# Patient Record
Sex: MALE | Race: WHITE | NOT HISPANIC OR LATINO | Employment: FULL TIME | URBAN - METROPOLITAN AREA
[De-identification: names, ages, dates, MRNs, and addresses within clinical notes are randomized per-mention and may not be internally consistent; named-entity substitution may affect disease eponyms.]

---

## 2017-01-17 ENCOUNTER — HISTORICAL (OUTPATIENT)
Dept: RADIOLOGY | Facility: HOSPITAL | Age: 67
End: 2017-01-17

## 2017-06-15 ENCOUNTER — HISTORICAL (OUTPATIENT)
Dept: ADMINISTRATIVE | Facility: HOSPITAL | Age: 67
End: 2017-06-15

## 2017-06-15 LAB
ABS NEUT (OLG): 4.73 X10(3)/MCL (ref 2.1–9.2)
ALBUMIN SERPL-MCNC: 4.7 GM/DL (ref 3.4–5)
ALBUMIN/GLOB SERPL: 1 {RATIO}
ALP SERPL-CCNC: 67 UNIT/L (ref 20–120)
ALT SERPL-CCNC: 31 UNIT/L
AST SERPL-CCNC: 29 UNIT/L
BASOPHILS # BLD AUTO: 0.04 X10(3)/MCL
BASOPHILS NFR BLD AUTO: 1 % (ref 0–1)
BILIRUB SERPL-MCNC: 1.8 MG/DL
BILIRUBIN DIRECT+TOT PNL SERPL-MCNC: 0.2 MG/DL
BILIRUBIN DIRECT+TOT PNL SERPL-MCNC: 1.6 MG/DL
BUN SERPL-MCNC: 23 MG/DL (ref 7–25)
CALCIUM SERPL-MCNC: 9.6 MG/DL (ref 8.4–10.3)
CHLORIDE SERPL-SCNC: 100 MMOL/L (ref 96–110)
CHOLEST SERPL-MCNC: 105 MG/DL
CHOLEST/HDLC SERPL: 3.5 {RATIO} (ref 0–5)
CO2 SERPL-SCNC: 30 MMOL/L (ref 24–32)
CREAT SERPL-MCNC: 1.12 MG/DL (ref 0.7–1.4)
EOSINOPHIL # BLD AUTO: 0.11 X10(3)/MCL
EOSINOPHIL NFR BLD AUTO: 2 % (ref 0–5)
ERYTHROCYTE [DISTWIDTH] IN BLOOD BY AUTOMATED COUNT: 13.5 % (ref 11.5–14.5)
EST. AVERAGE GLUCOSE BLD GHB EST-MCNC: 105 MG/DL
GLOBULIN SER-MCNC: 3.3 GM/ML (ref 2.3–3.5)
GLUCOSE SERPL-MCNC: 98 MG/DL (ref 65–99)
HBA1C MFR BLD: 5.3 % (ref 4.7–5.6)
HCT VFR BLD AUTO: 54.8 % (ref 40–51)
HDLC SERPL-MCNC: 30 MG/DL
HGB BLD-MCNC: 18.1 GM/DL (ref 13.5–17.5)
IMM GRANULOCYTES # BLD AUTO: 0.03 10*3/UL
IMM GRANULOCYTES NFR BLD AUTO: 0 %
LDLC SERPL CALC-MCNC: 54 MG/DL (ref 0–130)
LYMPHOCYTES # BLD AUTO: 1.71 X10(3)/MCL
LYMPHOCYTES NFR BLD AUTO: 24 % (ref 15–40)
MCH RBC QN AUTO: 29.1 PG (ref 26–34)
MCHC RBC AUTO-ENTMCNC: 33 GM/DL (ref 31–37)
MCV RBC AUTO: 88.1 FL (ref 80–100)
MONOCYTES # BLD AUTO: 0.43 X10(3)/MCL
MONOCYTES NFR BLD AUTO: 6 % (ref 4–12)
NEUTROPHILS # BLD AUTO: 4.73 X10(3)/MCL
NEUTROPHILS NFR BLD AUTO: 67 X10(3)/MCL
PLATELET # BLD AUTO: 191 X10(3)/MCL (ref 130–400)
PMV BLD AUTO: 9.8 FL (ref 7.4–10.4)
POTASSIUM SERPL-SCNC: 5 MMOL/L (ref 3.6–5.2)
PROT SERPL-MCNC: 8 GM/DL (ref 6–8)
PSA SERPL-MCNC: 1.8 NG/ML (ref 0–4)
RBC # BLD AUTO: 6.22 X10(6)/MCL (ref 4.5–5.9)
SODIUM SERPL-SCNC: 137 MMOL/L (ref 135–146)
TESTOST SERPL-MCNC: 787.3 NG/DL (ref 241–827)
TRIGL SERPL-MCNC: 107 MG/DL
TSH SERPL-ACNC: 2.1 MIU/L (ref 0.5–5)
VLDLC SERPL CALC-MCNC: 21 MG/DL
WBC # SPEC AUTO: 7 X10(3)/MCL (ref 4.5–11)

## 2017-08-21 ENCOUNTER — HISTORICAL (OUTPATIENT)
Dept: RADIOLOGY | Facility: HOSPITAL | Age: 67
End: 2017-08-21

## 2017-10-05 ENCOUNTER — HISTORICAL (OUTPATIENT)
Dept: RADIOLOGY | Facility: HOSPITAL | Age: 67
End: 2017-10-05

## 2017-11-06 ENCOUNTER — HISTORICAL (OUTPATIENT)
Dept: LAB | Facility: HOSPITAL | Age: 67
End: 2017-11-06

## 2017-11-09 ENCOUNTER — HISTORICAL (OUTPATIENT)
Dept: CARDIOLOGY | Facility: HOSPITAL | Age: 67
End: 2017-11-09

## 2017-11-09 LAB
CHOLEST SERPL-MCNC: 155 MG/DL (ref 0–200)
CHOLEST/HDLC SERPL: 5.5 {RATIO} (ref 0–5)
HDLC SERPL-MCNC: 28 MG/DL (ref 35–60)
LDLC SERPL CALC-MCNC: 103 MG/DL (ref 0–129)
TRIGL SERPL-MCNC: 118 MG/DL (ref 30–150)
VLDLC SERPL CALC-MCNC: 24 MG/DL

## 2018-04-16 ENCOUNTER — HISTORICAL (OUTPATIENT)
Dept: ADMINISTRATIVE | Facility: HOSPITAL | Age: 68
End: 2018-04-16

## 2018-04-16 LAB
ABS NEUT (OLG): 4.25 X10(3)/MCL (ref 2.1–9.2)
ALBUMIN SERPL-MCNC: 4.4 GM/DL (ref 3.4–5)
ALBUMIN/GLOB SERPL: 1 RATIO (ref 1–2)
ALP SERPL-CCNC: 71 UNIT/L (ref 45–117)
ALT SERPL-CCNC: 35 UNIT/L (ref 12–78)
AST SERPL-CCNC: 25 UNIT/L (ref 15–37)
BASOPHILS # BLD AUTO: 0.04 X10(3)/MCL
BASOPHILS NFR BLD AUTO: 1 %
BILIRUB SERPL-MCNC: 0.9 MG/DL (ref 0.2–1)
BILIRUBIN DIRECT+TOT PNL SERPL-MCNC: 0.2 MG/DL
BILIRUBIN DIRECT+TOT PNL SERPL-MCNC: 0.7 MG/DL
BUN SERPL-MCNC: 21 MG/DL (ref 7–18)
CALCIUM SERPL-MCNC: 9.6 MG/DL (ref 8.5–10.1)
CHLORIDE SERPL-SCNC: 104 MMOL/L (ref 98–107)
CHOLEST SERPL-MCNC: 154 MG/DL
CHOLEST/HDLC SERPL: 4.3 {RATIO} (ref 0–5)
CO2 SERPL-SCNC: 30 MMOL/L (ref 21–32)
CREAT SERPL-MCNC: 1.2 MG/DL (ref 0.6–1.3)
EOSINOPHIL # BLD AUTO: 0.09 X10(3)/MCL
EOSINOPHIL NFR BLD AUTO: 1 %
ERYTHROCYTE [DISTWIDTH] IN BLOOD BY AUTOMATED COUNT: 13.5 % (ref 11.5–14.5)
EST. AVERAGE GLUCOSE BLD GHB EST-MCNC: 103 MG/DL
GLOBULIN SER-MCNC: 3.8 GM/ML (ref 2.3–3.5)
GLUCOSE SERPL-MCNC: 90 MG/DL (ref 74–106)
HBA1C MFR BLD: 5.2 % (ref 4.2–6.3)
HCT VFR BLD AUTO: 56.8 % (ref 40–51)
HDLC SERPL-MCNC: 36 MG/DL
HGB BLD-MCNC: 18.8 GM/DL (ref 13.5–17.5)
IMM GRANULOCYTES # BLD AUTO: 0.02 10*3/UL
IMM GRANULOCYTES NFR BLD AUTO: 0 %
LDLC SERPL CALC-MCNC: 81 MG/DL (ref 0–130)
LYMPHOCYTES # BLD AUTO: 1.5 X10(3)/MCL
LYMPHOCYTES NFR BLD AUTO: 24 % (ref 13–40)
MCH RBC QN AUTO: 30 PG (ref 26–34)
MCHC RBC AUTO-ENTMCNC: 33.1 GM/DL (ref 31–37)
MCV RBC AUTO: 90.7 FL (ref 80–100)
MONOCYTES # BLD AUTO: 0.41 X10(3)/MCL
MONOCYTES NFR BLD AUTO: 6 % (ref 4–12)
NEUTROPHILS # BLD AUTO: 4.25 X10(3)/MCL
NEUTROPHILS NFR BLD AUTO: 67 X10(3)/MCL
PLATELET # BLD AUTO: 212 X10(3)/MCL (ref 130–400)
PMV BLD AUTO: 10.2 FL (ref 7.4–10.4)
POTASSIUM SERPL-SCNC: 4.5 MMOL/L (ref 3.5–5.1)
PROT SERPL-MCNC: 8.2 GM/DL (ref 6.4–8.2)
RBC # BLD AUTO: 6.26 X10(6)/MCL (ref 4.5–5.9)
SODIUM SERPL-SCNC: 143 MMOL/L (ref 136–145)
T4 FREE SERPL-MCNC: 1.04 NG/DL (ref 0.76–1.46)
TESTOST SERPL-MCNC: 425 NG/DL
TRIGL SERPL-MCNC: 185 MG/DL
TSH SERPL-ACNC: 4.78 MIU/L (ref 0.36–3.74)
VLDLC SERPL CALC-MCNC: 37 MG/DL
WBC # SPEC AUTO: 6.3 X10(3)/MCL (ref 4.5–11)

## 2018-07-09 ENCOUNTER — HISTORICAL (OUTPATIENT)
Dept: FAMILY MEDICINE | Facility: CLINIC | Age: 68
End: 2018-07-09

## 2018-07-25 ENCOUNTER — HISTORICAL (OUTPATIENT)
Dept: MEDSURG UNIT | Facility: HOSPITAL | Age: 68
End: 2018-07-25

## 2018-07-25 LAB
CHOLEST SERPL-MCNC: 139 MG/DL (ref 0–200)
CHOLEST/HDLC SERPL: 4.8 {RATIO} (ref 0–5)
HDLC SERPL-MCNC: 29 MG/DL (ref 35–60)
LDLC SERPL CALC-MCNC: 96 MG/DL (ref 0–129)
TRIGL SERPL-MCNC: 68 MG/DL (ref 30–150)
VLDLC SERPL CALC-MCNC: 14 MG/DL

## 2018-07-26 LAB
ABS NEUT (OLG): 5.38 X10(3)/MCL (ref 2.1–9.2)
ALBUMIN SERPL-MCNC: 3.4 GM/DL (ref 3.4–5)
ALBUMIN/GLOB SERPL: 1 {RATIO}
ALP SERPL-CCNC: 63 UNIT/L (ref 50–136)
ALT SERPL-CCNC: 25 UNIT/L (ref 12–78)
AST SERPL-CCNC: 21 UNIT/L (ref 15–37)
BASOPHILS # BLD AUTO: 0 X10(3)/MCL (ref 0–0.2)
BASOPHILS NFR BLD AUTO: 0 %
BILIRUB SERPL-MCNC: 1.2 MG/DL (ref 0.2–1)
BILIRUBIN DIRECT+TOT PNL SERPL-MCNC: 0.3 MG/DL (ref 0–0.2)
BILIRUBIN DIRECT+TOT PNL SERPL-MCNC: 0.9 MG/DL (ref 0–0.8)
BUN SERPL-MCNC: 15 MG/DL (ref 7–18)
CALCIUM SERPL-MCNC: 8.7 MG/DL (ref 8.5–10.1)
CHLORIDE SERPL-SCNC: 105 MMOL/L (ref 98–107)
CO2 SERPL-SCNC: 31 MMOL/L (ref 21–32)
CREAT SERPL-MCNC: 1.16 MG/DL (ref 0.7–1.3)
EOSINOPHIL # BLD AUTO: 0 X10(3)/MCL (ref 0–0.9)
EOSINOPHIL NFR BLD AUTO: 1 %
ERYTHROCYTE [DISTWIDTH] IN BLOOD BY AUTOMATED COUNT: 14.3 % (ref 11.5–17)
GLOBULIN SER-MCNC: 3.3 GM/DL (ref 2.4–3.5)
GLUCOSE SERPL-MCNC: 96 MG/DL (ref 74–106)
HCT VFR BLD AUTO: 53.3 % (ref 42–52)
HGB BLD-MCNC: 17.2 GM/DL (ref 14–18)
LYMPHOCYTES # BLD AUTO: 1 X10(3)/MCL (ref 0.6–4.6)
LYMPHOCYTES NFR BLD AUTO: 14 %
MCH RBC QN AUTO: 30.1 PG (ref 27–31)
MCHC RBC AUTO-ENTMCNC: 32.3 GM/DL (ref 33–36)
MCV RBC AUTO: 93.3 FL (ref 80–94)
MONOCYTES # BLD AUTO: 0.5 X10(3)/MCL (ref 0.1–1.3)
MONOCYTES NFR BLD AUTO: 7 %
NEUTROPHILS # BLD AUTO: 5.38 X10(3)/MCL (ref 1.4–7.9)
NEUTROPHILS NFR BLD AUTO: 77 %
PLATELET # BLD AUTO: 154 X10(3)/MCL (ref 130–400)
PMV BLD AUTO: 9.6 FL (ref 9.4–12.4)
POTASSIUM SERPL-SCNC: 5.3 MMOL/L (ref 3.5–5.1)
PROT SERPL-MCNC: 6.7 GM/DL (ref 6.4–8.2)
RBC # BLD AUTO: 5.71 X10(6)/MCL (ref 4.7–6.1)
SODIUM SERPL-SCNC: 142 MMOL/L (ref 136–145)
WBC # SPEC AUTO: 7 X10(3)/MCL (ref 4.5–11.5)

## 2018-12-14 ENCOUNTER — TELEPHONE (OUTPATIENT)
Dept: PHARMACY | Facility: CLINIC | Age: 68
End: 2018-12-14

## 2018-12-19 NOTE — TELEPHONE ENCOUNTER
Documentation Only:    Faxed Prior Authorization form and supporting documentation for Repatha to insurance on 12/19/2018.

## 2018-12-26 ENCOUNTER — HISTORICAL (OUTPATIENT)
Dept: ADMINISTRATIVE | Facility: HOSPITAL | Age: 68
End: 2018-12-26

## 2018-12-26 LAB
APPEARANCE, UA: ABNORMAL
BACTERIA #/AREA URNS AUTO: ABNORMAL /[HPF]
BILIRUB SERPL-MCNC: NEGATIVE MG/DL
BILIRUB UR QL STRIP: NEGATIVE
BLOOD URINE, POC: NEGATIVE
CLARITY, POC UA: NORMAL
COLOR UR: YELLOW
COLOR, POC UA: NORMAL
GLUCOSE (UA): NORMAL
GLUCOSE UR QL STRIP: NEGATIVE
HGB UR QL STRIP: NEGATIVE
HYALINE CASTS #/AREA URNS LPF: ABNORMAL /[LPF]
KETONES UR QL STRIP: ABNORMAL
KETONES UR QL STRIP: NEGATIVE
LEUKOCYTE EST, POC UA: NORMAL
LEUKOCYTE ESTERASE UR QL STRIP: 500 LEU/UL
NITRITE UR QL STRIP: ABNORMAL
NITRITE, POC UA: POSITIVE
PH UR STRIP: 6 [PH] (ref 4.5–8)
PH, POC UA: 6.5
PROT UR QL STRIP: 10 MG/DL
PROTEIN, POC: NORMAL
RBC #/AREA URNS AUTO: ABNORMAL /[HPF]
SP GR UR STRIP: 1.03 (ref 1–1.03)
SPECIFIC GRAVITY, POC UA: 1.02
SQUAMOUS #/AREA URNS LPF: ABNORMAL /[LPF]
UROBILINOGEN UR STRIP-ACNC: NORMAL
UROBILINOGEN, POC UA: NORMAL
WBC #/AREA URNS AUTO: ABNORMAL /HPF

## 2019-01-16 NOTE — TELEPHONE ENCOUNTER
LM at Dr. Cosmo Gates's office ([phone]) in regards to Repatha denial - LDL <70mg/dL (58mg/dL) so insurance denied. It was noted that it is controlled on statins but patient is having difficulty tolerating statins due to myalgias. Pending MD response on how to proceed.   Appeal window: 180days (from 12/28/18). TTN

## 2019-04-29 ENCOUNTER — HISTORICAL (OUTPATIENT)
Dept: RADIOLOGY | Facility: HOSPITAL | Age: 69
End: 2019-04-29

## 2019-08-12 ENCOUNTER — HISTORICAL (OUTPATIENT)
Dept: ADMINISTRATIVE | Facility: HOSPITAL | Age: 69
End: 2019-08-12

## 2019-08-12 LAB
ALBUMIN SERPL-MCNC: 4.2 GM/DL (ref 3.4–5)
ALBUMIN/GLOB SERPL: 1.1 RATIO (ref 1.1–2)
ALP SERPL-CCNC: 74 UNIT/L (ref 45–117)
ALT SERPL-CCNC: 39 UNIT/L (ref 12–78)
APPEARANCE, UA: CLEAR
AST SERPL-CCNC: 24 UNIT/L (ref 15–37)
BACTERIA #/AREA URNS AUTO: ABNORMAL /[HPF]
BILIRUB SERPL-MCNC: 0.9 MG/DL (ref 0.2–1)
BILIRUB UR QL STRIP: NEGATIVE
BILIRUBIN DIRECT+TOT PNL SERPL-MCNC: 0.2 MG/DL
BILIRUBIN DIRECT+TOT PNL SERPL-MCNC: 0.7 MG/DL
BUN SERPL-MCNC: 26 MG/DL (ref 7–18)
CALCIUM SERPL-MCNC: 9.6 MG/DL (ref 8.5–10.1)
CHLORIDE SERPL-SCNC: 108 MMOL/L (ref 98–107)
CHOLEST SERPL-MCNC: 182 MG/DL
CHOLEST/HDLC SERPL: 4.6 {RATIO} (ref 0–5)
CO2 SERPL-SCNC: 31 MMOL/L (ref 21–32)
COLOR UR: YELLOW
CREAT SERPL-MCNC: 1 MG/DL (ref 0.6–1.3)
GLOBULIN SER-MCNC: 3.8 GM/ML (ref 2.3–3.5)
GLUCOSE (UA): NORMAL
GLUCOSE SERPL-MCNC: 89 MG/DL (ref 74–106)
HDLC SERPL-MCNC: 40 MG/DL
HGB UR QL STRIP: NEGATIVE
HYALINE CASTS #/AREA URNS LPF: ABNORMAL /[LPF]
KETONES UR QL STRIP: NEGATIVE
LDLC SERPL CALC-MCNC: 112 MG/DL (ref 0–130)
LEUKOCYTE ESTERASE UR QL STRIP: NEGATIVE
NITRITE UR QL STRIP: NEGATIVE
PH UR STRIP: 6.5 [PH] (ref 4.5–8)
POTASSIUM SERPL-SCNC: 4.8 MMOL/L (ref 3.5–5.1)
PROT SERPL-MCNC: 8 GM/DL (ref 6.4–8.2)
PROT UR QL STRIP: NEGATIVE
PSA SERPL-MCNC: 2.3 NG/ML
RBC #/AREA URNS AUTO: ABNORMAL /[HPF]
SODIUM SERPL-SCNC: 144 MMOL/L (ref 136–145)
SP GR UR STRIP: 1.03 (ref 1–1.03)
SQUAMOUS #/AREA URNS LPF: ABNORMAL /[LPF]
TRIGL SERPL-MCNC: 151 MG/DL
UROBILINOGEN UR STRIP-ACNC: NORMAL
VLDLC SERPL CALC-MCNC: 30 MG/DL
WBC #/AREA URNS AUTO: ABNORMAL /HPF

## 2019-09-06 ENCOUNTER — HISTORICAL (OUTPATIENT)
Dept: RADIOLOGY | Facility: HOSPITAL | Age: 69
End: 2019-09-06

## 2019-11-08 ENCOUNTER — HISTORICAL (OUTPATIENT)
Dept: LAB | Facility: HOSPITAL | Age: 69
End: 2019-11-08

## 2019-11-08 LAB
ABS NEUT (OLG): 2.3 X10(3)/MCL (ref 2.1–9.2)
BASOPHILS # BLD AUTO: 0 X10(3)/MCL (ref 0–0.2)
BASOPHILS NFR BLD AUTO: 0 %
BUN SERPL-MCNC: 23 MG/DL (ref 7–18)
CALCIUM SERPL-MCNC: 8.8 MG/DL (ref 8.5–10.1)
CHLORIDE SERPL-SCNC: 109 MMOL/L (ref 98–107)
CO2 SERPL-SCNC: 26 MMOL/L (ref 21–32)
CREAT SERPL-MCNC: 1.1 MG/DL (ref 0.6–1.3)
CREAT/UREA NIT SERPL: 21
EOSINOPHIL # BLD AUTO: 0.1 X10(3)/MCL (ref 0–0.9)
EOSINOPHIL NFR BLD AUTO: 2 %
ERYTHROCYTE [DISTWIDTH] IN BLOOD BY AUTOMATED COUNT: 12.7 % (ref 11.5–14.5)
GLUCOSE SERPL-MCNC: 103 MG/DL (ref 74–106)
HCT VFR BLD AUTO: 48.6 % (ref 40–51)
HGB BLD-MCNC: 16.3 GM/DL (ref 13.5–17.5)
IMM GRANULOCYTES # BLD AUTO: 0.01 10*3/UL
IMM GRANULOCYTES NFR BLD AUTO: 0 %
INR PPP: 1.3 (ref 0.9–1.2)
LYMPHOCYTES # BLD AUTO: 1.5 X10(3)/MCL (ref 0.6–4.6)
LYMPHOCYTES NFR BLD AUTO: 34 %
MCH RBC QN AUTO: 30.3 PG (ref 26–34)
MCHC RBC AUTO-ENTMCNC: 33.5 GM/DL (ref 31–37)
MCV RBC AUTO: 90.3 FL (ref 80–100)
MONOCYTES # BLD AUTO: 0.5 X10(3)/MCL (ref 0.1–1.3)
MONOCYTES NFR BLD AUTO: 11 %
NEUTROPHILS # BLD AUTO: 2.3 X10(3)/MCL (ref 2.1–9.2)
NEUTROPHILS NFR BLD AUTO: 52 %
PLATELET # BLD AUTO: 155 X10(3)/MCL (ref 130–400)
PMV BLD AUTO: 9.1 FL (ref 7.4–10.4)
POTASSIUM SERPL-SCNC: 4.4 MMOL/L (ref 3.5–5.1)
PROTHROMBIN TIME: 16.1 SECOND(S) (ref 11.9–14.4)
RBC # BLD AUTO: 5.38 X10(6)/MCL (ref 4.5–5.9)
SODIUM SERPL-SCNC: 139 MMOL/L (ref 136–145)
WBC # SPEC AUTO: 4.4 X10(3)/MCL (ref 4.5–11)

## 2019-11-15 ENCOUNTER — HISTORICAL (OUTPATIENT)
Dept: MEDSURG UNIT | Facility: HOSPITAL | Age: 69
End: 2019-11-15

## 2019-11-15 LAB
CHOLEST SERPL-MCNC: 182 MG/DL (ref 0–200)
CHOLEST/HDLC SERPL: 4.8 {RATIO} (ref 0–5)
HDLC SERPL-MCNC: 38 MG/DL (ref 35–60)
LDLC SERPL CALC-MCNC: 114 MG/DL (ref 0–129)
TRIGL SERPL-MCNC: 148 MG/DL (ref 30–150)
VLDLC SERPL CALC-MCNC: 30 MG/DL

## 2019-11-16 LAB
ABS NEUT (OLG): 4.43 X10(3)/MCL (ref 2.1–9.2)
ALBUMIN SERPL-MCNC: 3.6 GM/DL (ref 3.4–5)
ALBUMIN/GLOB SERPL: 1.1 {RATIO}
ALP SERPL-CCNC: 75 UNIT/L (ref 50–136)
ALT SERPL-CCNC: 46 UNIT/L (ref 12–78)
AST SERPL-CCNC: 34 UNIT/L (ref 15–37)
BASOPHILS # BLD AUTO: 0 X10(3)/MCL (ref 0–0.2)
BASOPHILS NFR BLD AUTO: 0 %
BILIRUB SERPL-MCNC: 0.7 MG/DL (ref 0.2–1)
BILIRUBIN DIRECT+TOT PNL SERPL-MCNC: 0.2 MG/DL (ref 0–0.2)
BILIRUBIN DIRECT+TOT PNL SERPL-MCNC: 0.5 MG/DL (ref 0–0.8)
BUN SERPL-MCNC: 18 MG/DL (ref 7–18)
CALCIUM SERPL-MCNC: 9.1 MG/DL (ref 8.5–10.1)
CHLORIDE SERPL-SCNC: 104 MMOL/L (ref 98–107)
CO2 SERPL-SCNC: 25 MMOL/L (ref 21–32)
CREAT SERPL-MCNC: 1.08 MG/DL (ref 0.7–1.3)
EOSINOPHIL # BLD AUTO: 0 X10(3)/MCL (ref 0–0.9)
EOSINOPHIL NFR BLD AUTO: 1 %
ERYTHROCYTE [DISTWIDTH] IN BLOOD BY AUTOMATED COUNT: 13 % (ref 11.5–17)
GLOBULIN SER-MCNC: 3.2 GM/DL (ref 2.4–3.5)
GLUCOSE SERPL-MCNC: 103 MG/DL (ref 74–106)
HCT VFR BLD AUTO: 53.4 % (ref 42–52)
HGB BLD-MCNC: 16.7 GM/DL (ref 14–18)
LYMPHOCYTES # BLD AUTO: 1.6 X10(3)/MCL (ref 0.6–4.6)
LYMPHOCYTES NFR BLD AUTO: 24 %
MCH RBC QN AUTO: 29.5 PG (ref 27–31)
MCHC RBC AUTO-ENTMCNC: 31.3 GM/DL (ref 33–36)
MCV RBC AUTO: 94.2 FL (ref 80–94)
MONOCYTES # BLD AUTO: 0.4 X10(3)/MCL (ref 0.1–1.3)
MONOCYTES NFR BLD AUTO: 7 %
NEUTROPHILS # BLD AUTO: 4.43 X10(3)/MCL (ref 2.1–9.2)
NEUTROPHILS NFR BLD AUTO: 68 %
PLATELET # BLD AUTO: 140 X10(3)/MCL (ref 130–400)
PMV BLD AUTO: 9.6 FL (ref 9.4–12.4)
POTASSIUM SERPL-SCNC: 4.3 MMOL/L (ref 3.5–5.1)
PROT SERPL-MCNC: 6.8 GM/DL (ref 6.4–8.2)
RBC # BLD AUTO: 5.67 X10(6)/MCL (ref 4.7–6.1)
SODIUM SERPL-SCNC: 138 MMOL/L (ref 136–145)
WBC # SPEC AUTO: 6.5 X10(3)/MCL (ref 4.5–11.5)

## 2019-12-23 ENCOUNTER — HISTORICAL (OUTPATIENT)
Dept: ADMINISTRATIVE | Facility: HOSPITAL | Age: 69
End: 2019-12-23

## 2020-03-19 ENCOUNTER — HISTORICAL (OUTPATIENT)
Dept: LAB | Facility: HOSPITAL | Age: 70
End: 2020-03-19

## 2020-03-19 LAB
APPEARANCE, UA: CLEAR
BACTERIA #/AREA URNS AUTO: ABNORMAL /HPF
BILIRUB UR QL STRIP: NEGATIVE
COLOR UR: YELLOW
GLUCOSE (UA): NEGATIVE
HGB UR QL STRIP: NEGATIVE
HYALINE CASTS #/AREA URNS LPF: ABNORMAL /LPF
KETONES UR QL STRIP: ABNORMAL
LEUKOCYTE ESTERASE UR QL STRIP: NEGATIVE
NITRITE UR QL STRIP: NEGATIVE
PH UR STRIP: 6 [PH] (ref 4.5–8)
PROT UR QL STRIP: 20 MG/DL
RBC #/AREA URNS AUTO: ABNORMAL /HPF
SP GR UR STRIP: 1.04 (ref 1–1.03)
SQUAMOUS #/AREA URNS LPF: ABNORMAL /LPF
UROBILINOGEN UR STRIP-ACNC: 2 MG/DL
WBC #/AREA URNS AUTO: ABNORMAL /HPF

## 2020-04-28 ENCOUNTER — HISTORICAL (OUTPATIENT)
Dept: LAB | Facility: HOSPITAL | Age: 70
End: 2020-04-28

## 2020-04-28 LAB
ALBUMIN SERPL-MCNC: 4.1 GM/DL (ref 3.4–5)
ALBUMIN/GLOB SERPL: 1 RATIO (ref 1.1–2)
ALP SERPL-CCNC: 72 UNIT/L (ref 45–117)
ALT SERPL-CCNC: 34 UNIT/L (ref 12–78)
AST SERPL-CCNC: 26 UNIT/L (ref 15–37)
BILIRUB SERPL-MCNC: 0.7 MG/DL (ref 0.2–1)
BILIRUBIN DIRECT+TOT PNL SERPL-MCNC: 0.2 MG/DL (ref 0–0.2)
BILIRUBIN DIRECT+TOT PNL SERPL-MCNC: 0.5 MG/DL
BUN SERPL-MCNC: 23 MG/DL (ref 7–18)
CALCIUM SERPL-MCNC: 9.1 MG/DL (ref 8.5–10.1)
CHLORIDE SERPL-SCNC: 105 MMOL/L (ref 98–107)
CHOLEST SERPL-MCNC: 175 MG/DL
CHOLEST/HDLC SERPL: 5.1 {RATIO} (ref 0–5)
CO2 SERPL-SCNC: 28 MMOL/L (ref 21–32)
CREAT SERPL-MCNC: 1.1 MG/DL (ref 0.6–1.3)
ERYTHROCYTE [DISTWIDTH] IN BLOOD BY AUTOMATED COUNT: 13.2 % (ref 11.5–14.5)
GLOBULIN SER-MCNC: 4.2 GM/ML (ref 2.3–3.5)
GLUCOSE SERPL-MCNC: 92 MG/DL (ref 74–106)
HCT VFR BLD AUTO: 52.6 % (ref 40–51)
HDLC SERPL-MCNC: 34 MG/DL (ref 40–59)
HGB BLD-MCNC: 16.9 GM/DL (ref 13.5–17.5)
LDLC SERPL CALC-MCNC: 115 MG/DL
MCH RBC QN AUTO: 28.3 PG (ref 26–34)
MCHC RBC AUTO-ENTMCNC: 32.1 GM/DL (ref 31–37)
MCV RBC AUTO: 88 FL (ref 80–100)
PLATELET # BLD AUTO: 184 X10(3)/MCL (ref 130–400)
PMV BLD AUTO: 10 FL (ref 7.4–10.4)
POTASSIUM SERPL-SCNC: 4.5 MMOL/L (ref 3.5–5.1)
PROT SERPL-MCNC: 8.3 GM/DL (ref 6.4–8.2)
PSA SERPL-MCNC: 2.3 NG/ML
RBC # BLD AUTO: 5.98 X10(6)/MCL (ref 4.5–5.9)
SODIUM SERPL-SCNC: 138 MMOL/L (ref 136–145)
TRIGL SERPL-MCNC: 132 MG/DL
TSH SERPL-ACNC: 2.71 MIU/L (ref 0.36–3.74)
VLDLC SERPL CALC-MCNC: 26 MG/DL
WBC # SPEC AUTO: 5.8 X10(3)/MCL (ref 4.5–11)

## 2020-05-08 ENCOUNTER — HISTORICAL (OUTPATIENT)
Dept: LAB | Facility: HOSPITAL | Age: 70
End: 2020-05-08

## 2020-05-21 ENCOUNTER — HISTORICAL (OUTPATIENT)
Dept: RADIOLOGY | Facility: HOSPITAL | Age: 70
End: 2020-05-21

## 2020-07-13 ENCOUNTER — HISTORICAL (OUTPATIENT)
Dept: ADMINISTRATIVE | Facility: HOSPITAL | Age: 70
End: 2020-07-13

## 2020-07-13 LAB — TESTOST SERPL-MCNC: >1500 NG/DL (ref 220.91–715.81)

## 2020-08-05 ENCOUNTER — HISTORICAL (OUTPATIENT)
Dept: LAB | Facility: HOSPITAL | Age: 70
End: 2020-08-05

## 2020-08-05 LAB — POTASSIUM SERPL-SCNC: 5.2 MMOL/L (ref 3.5–5.1)

## 2021-01-08 ENCOUNTER — HISTORICAL (OUTPATIENT)
Dept: RADIOLOGY | Facility: HOSPITAL | Age: 71
End: 2021-01-08

## 2021-04-18 ENCOUNTER — HISTORICAL (OUTPATIENT)
Dept: ADMINISTRATIVE | Facility: HOSPITAL | Age: 71
End: 2021-04-18

## 2021-04-18 LAB — SARS-COV-2 RNA RESP QL NAA+PROBE: NOT DETECTED

## 2021-05-04 ENCOUNTER — HISTORICAL (OUTPATIENT)
Dept: ADMINISTRATIVE | Facility: HOSPITAL | Age: 71
End: 2021-05-04

## 2021-05-04 LAB
AMPHET UR QL SCN: NEGATIVE
BARBITURATE SCN PRESENT UR: NEGATIVE
BENZODIAZ UR QL SCN: NEGATIVE
CANNABINOIDS UR QL SCN: POSITIVE
COCAINE UR QL SCN: NEGATIVE
OPIATES UR QL SCN: NEGATIVE
PCP UR QL: NEGATIVE
PH UR STRIP.AUTO: 7 [PH] (ref 3–11)

## 2021-05-13 ENCOUNTER — HISTORICAL (OUTPATIENT)
Dept: INFUSION THERAPY | Facility: HOSPITAL | Age: 71
End: 2021-05-13

## 2021-05-13 LAB
ABS NEUT (OLG): 3.34 X10(3)/MCL (ref 2.1–9.2)
ALBUMIN SERPL-MCNC: 3.7 GM/DL (ref 3.4–4.8)
ALBUMIN/GLOB SERPL: 1.1 RATIO (ref 1.1–2)
ALP SERPL-CCNC: 76 UNIT/L (ref 40–150)
ALT SERPL-CCNC: 18 UNIT/L (ref 0–55)
AST SERPL-CCNC: 21 UNIT/L (ref 5–34)
BASOPHILS # BLD AUTO: 0 X10(3)/MCL (ref 0–0.2)
BASOPHILS NFR BLD AUTO: 0 %
BILIRUB SERPL-MCNC: 0.8 MG/DL
BILIRUBIN DIRECT+TOT PNL SERPL-MCNC: 0.4 MG/DL (ref 0–0.5)
BILIRUBIN DIRECT+TOT PNL SERPL-MCNC: 0.4 MG/DL (ref 0–0.8)
BUN SERPL-MCNC: 13.3 MG/DL (ref 8.4–25.7)
CALCIUM SERPL-MCNC: 9.1 MG/DL (ref 8.8–10)
CHLORIDE SERPL-SCNC: 108 MMOL/L (ref 98–107)
CO2 SERPL-SCNC: 22 MMOL/L (ref 23–31)
CREAT SERPL-MCNC: 0.94 MG/DL (ref 0.73–1.18)
EOSINOPHIL # BLD AUTO: 0.1 X10(3)/MCL (ref 0–0.9)
EOSINOPHIL NFR BLD AUTO: 2 %
ERYTHROCYTE [DISTWIDTH] IN BLOOD BY AUTOMATED COUNT: 13.9 % (ref 11.5–14.5)
GLOBULIN SER-MCNC: 3.4 GM/DL (ref 2.4–3.5)
GLUCOSE SERPL-MCNC: 110 MG/DL (ref 82–115)
HCT VFR BLD AUTO: 49.7 % (ref 40–51)
HGB BLD-MCNC: 16.2 GM/DL (ref 13.5–17.5)
IMM GRANULOCYTES # BLD AUTO: 0.03 10*3/UL
IMM GRANULOCYTES NFR BLD AUTO: 0 %
LYMPHOCYTES # BLD AUTO: 1.9 X10(3)/MCL (ref 0.6–4.6)
LYMPHOCYTES NFR BLD AUTO: 33 %
MCH RBC QN AUTO: 29.8 PG (ref 26–34)
MCHC RBC AUTO-ENTMCNC: 32.6 GM/DL (ref 31–37)
MCV RBC AUTO: 91.4 FL (ref 80–100)
MONOCYTES # BLD AUTO: 0.4 X10(3)/MCL (ref 0.1–1.3)
MONOCYTES NFR BLD AUTO: 8 %
NEUTROPHILS # BLD AUTO: 3.34 X10(3)/MCL (ref 2.1–9.2)
NEUTROPHILS NFR BLD AUTO: 56 %
PLATELET # BLD AUTO: 208 X10(3)/MCL (ref 130–400)
PMV BLD AUTO: 10 FL (ref 7.4–10.4)
POTASSIUM SERPL-SCNC: 4.6 MMOL/L (ref 3.5–5.1)
PROT SERPL-MCNC: 7.1 GM/DL (ref 5.8–7.6)
RBC # BLD AUTO: 5.44 X10(6)/MCL (ref 4.5–5.9)
SODIUM SERPL-SCNC: 137 MMOL/L (ref 136–145)
WBC # SPEC AUTO: 5.9 X10(3)/MCL (ref 4.5–11)

## 2021-05-19 ENCOUNTER — HISTORICAL (OUTPATIENT)
Dept: FAMILY MEDICINE | Facility: CLINIC | Age: 71
End: 2021-05-19

## 2021-05-19 LAB
CHOLEST SERPL-MCNC: 114 MG/DL
CHOLEST/HDLC SERPL: 4 {RATIO} (ref 0–5)
EST. AVERAGE GLUCOSE BLD GHB EST-MCNC: 99.7 MG/DL
HBA1C MFR BLD: 5.1 %
HDLC SERPL-MCNC: 26 MG/DL (ref 35–60)
LDLC SERPL CALC-MCNC: 68 MG/DL (ref 50–140)
TESTOST SERPL-MCNC: 268.2 NG/DL (ref 220.91–715.81)
TRIGL SERPL-MCNC: 101 MG/DL (ref 34–140)
VLDLC SERPL CALC-MCNC: 20 MG/DL

## 2021-06-14 ENCOUNTER — HISTORICAL (OUTPATIENT)
Dept: INFUSION THERAPY | Facility: HOSPITAL | Age: 71
End: 2021-06-14

## 2021-06-14 LAB
ABS NEUT (OLG): 4.05 X10(3)/MCL (ref 2.1–9.2)
BASOPHILS # BLD AUTO: 0 X10(3)/MCL (ref 0–0.2)
BASOPHILS NFR BLD AUTO: 0 %
EOSINOPHIL # BLD AUTO: 0.1 X10(3)/MCL (ref 0–0.9)
EOSINOPHIL NFR BLD AUTO: 2 %
ERYTHROCYTE [DISTWIDTH] IN BLOOD BY AUTOMATED COUNT: 13.5 % (ref 11.5–14.5)
HCT VFR BLD AUTO: 47.7 % (ref 40–51)
HGB BLD-MCNC: 15.5 GM/DL (ref 13.5–17.5)
IMM GRANULOCYTES # BLD AUTO: 0.03 10*3/UL
IMM GRANULOCYTES NFR BLD AUTO: 0 %
LYMPHOCYTES # BLD AUTO: 1.7 X10(3)/MCL (ref 0.6–4.6)
LYMPHOCYTES NFR BLD AUTO: 27 %
MCH RBC QN AUTO: 29.9 PG (ref 26–34)
MCHC RBC AUTO-ENTMCNC: 32.5 GM/DL (ref 31–37)
MCV RBC AUTO: 91.9 FL (ref 80–100)
MONOCYTES # BLD AUTO: 0.3 X10(3)/MCL (ref 0.1–1.3)
MONOCYTES NFR BLD AUTO: 5 %
NEUTROPHILS # BLD AUTO: 4.05 X10(3)/MCL (ref 2.1–9.2)
NEUTROPHILS NFR BLD AUTO: 65 %
NRBC BLD AUTO-RTO: 0 % (ref 0–0.2)
PLATELET # BLD AUTO: 190 X10(3)/MCL (ref 130–400)
PMV BLD AUTO: 9.7 FL (ref 7.4–10.4)
RBC # BLD AUTO: 5.19 X10(6)/MCL (ref 4.5–5.9)
WBC # SPEC AUTO: 6.2 X10(3)/MCL (ref 4.5–11)

## 2021-07-12 ENCOUNTER — HISTORICAL (OUTPATIENT)
Dept: INFUSION THERAPY | Facility: HOSPITAL | Age: 71
End: 2021-07-12

## 2021-07-12 LAB
ABS NEUT (OLG): 4.2 X10(3)/MCL (ref 2.1–9.2)
BASOPHILS # BLD AUTO: 0 X10(3)/MCL (ref 0–0.2)
BASOPHILS NFR BLD AUTO: 0 %
EOSINOPHIL # BLD AUTO: 0.1 X10(3)/MCL (ref 0–0.9)
EOSINOPHIL NFR BLD AUTO: 1 %
ERYTHROCYTE [DISTWIDTH] IN BLOOD BY AUTOMATED COUNT: 13.9 % (ref 11.5–14.5)
HCT VFR BLD AUTO: 49.3 % (ref 40–51)
HGB BLD-MCNC: 15.7 GM/DL (ref 13.5–17.5)
IMM GRANULOCYTES # BLD AUTO: 0.01 10*3/UL
IMM GRANULOCYTES NFR BLD AUTO: 0 %
LYMPHOCYTES # BLD AUTO: 2.1 X10(3)/MCL (ref 0.6–4.6)
LYMPHOCYTES NFR BLD AUTO: 30 %
MCH RBC QN AUTO: 29.1 PG (ref 26–34)
MCHC RBC AUTO-ENTMCNC: 31.8 GM/DL (ref 31–37)
MCV RBC AUTO: 91.3 FL (ref 80–100)
MONOCYTES # BLD AUTO: 0.6 X10(3)/MCL (ref 0.1–1.3)
MONOCYTES NFR BLD AUTO: 8 %
NEUTROPHILS # BLD AUTO: 4.2 X10(3)/MCL (ref 2.1–9.2)
NEUTROPHILS NFR BLD AUTO: 60 %
NRBC BLD AUTO-RTO: 0 % (ref 0–0.2)
PLATELET # BLD AUTO: 213 X10(3)/MCL (ref 130–400)
PMV BLD AUTO: 9.7 FL (ref 7.4–10.4)
RBC # BLD AUTO: 5.4 X10(6)/MCL (ref 4.5–5.9)
WBC # SPEC AUTO: 7 X10(3)/MCL (ref 4.5–11)

## 2021-08-23 ENCOUNTER — HISTORICAL (OUTPATIENT)
Dept: INFUSION THERAPY | Facility: HOSPITAL | Age: 71
End: 2021-08-23

## 2021-08-23 LAB
HCT VFR BLD AUTO: 46.8 % (ref 40–51)
HGB BLD-MCNC: 14.7 GM/DL (ref 13.5–17.5)

## 2021-09-23 ENCOUNTER — HISTORICAL (OUTPATIENT)
Dept: INFUSION THERAPY | Facility: HOSPITAL | Age: 71
End: 2021-09-23

## 2021-09-23 LAB
HCT VFR BLD AUTO: 45.7 % (ref 40–51)
HGB BLD-MCNC: 14.5 GM/DL (ref 13.5–17.5)

## 2021-10-22 ENCOUNTER — HISTORICAL (OUTPATIENT)
Dept: INFUSION THERAPY | Facility: HOSPITAL | Age: 71
End: 2021-10-22

## 2021-10-22 LAB
HCT VFR BLD AUTO: 46.6 % (ref 40–51)
HGB BLD-MCNC: 15.3 GM/DL (ref 13.5–17.5)

## 2021-11-22 ENCOUNTER — HISTORICAL (OUTPATIENT)
Dept: INFUSION THERAPY | Facility: HOSPITAL | Age: 71
End: 2021-11-22

## 2021-11-22 LAB
HCT VFR BLD AUTO: 47.3 % (ref 40–51)
HGB BLD-MCNC: 15.3 GM/DL (ref 13.5–17.5)

## 2021-12-22 ENCOUNTER — HISTORICAL (OUTPATIENT)
Dept: INFUSION THERAPY | Facility: HOSPITAL | Age: 71
End: 2021-12-22

## 2021-12-22 LAB
HCT VFR BLD AUTO: 50.2 % (ref 40–51)
HGB BLD-MCNC: 16.5 GM/DL (ref 13.5–17.5)

## 2022-01-12 ENCOUNTER — HISTORICAL (OUTPATIENT)
Dept: ADMINISTRATIVE | Facility: HOSPITAL | Age: 72
End: 2022-01-12

## 2022-04-10 ENCOUNTER — HISTORICAL (OUTPATIENT)
Dept: ADMINISTRATIVE | Facility: HOSPITAL | Age: 72
End: 2022-04-10
Payer: COMMERCIAL

## 2022-04-22 ENCOUNTER — HISTORICAL (OUTPATIENT)
Dept: CARDIOLOGY | Facility: HOSPITAL | Age: 72
End: 2022-04-22
Payer: COMMERCIAL

## 2022-04-25 VITALS
SYSTOLIC BLOOD PRESSURE: 107 MMHG | BODY MASS INDEX: 30.78 KG/M2 | HEIGHT: 68 IN | DIASTOLIC BLOOD PRESSURE: 54 MMHG | OXYGEN SATURATION: 96 % | WEIGHT: 203.06 LBS

## 2022-05-03 NOTE — HISTORICAL [facility] CERNER
This is a historical note converted from [employer]. Formatting and pictures may have been removed.  Please reference Suraj for original formatting and attached multimedia. Chief Complaint  f/u CAD, HTN, Low Testosterone,HLD, anxiety?  History of Present Illness  66-year-old  male here for follow-up on multiple comorbidities  CAD- continues to follow with [facility]- Atorvastatin increased to 80mg QHS. compliant with all medications.  HTN- compliant with Lisinopril and Metoprolol? Denies headache, fatigue, confusion, vision changes, chest pain, palpitations, shortness of breath, difficulty breathing, dyspnea on exertion, edema, or claudication.  HLD- compliant with statin- no myalgias  Low testosterone- complaint with weekly injections  Anxiety- no SI/HI, controlled with medications, no side effects from medications  Denies?any fever,chills, headaches, vision changes, shortness of breath, chest pain, palpitations, cough, abdominal pain, edema, muscle, or joint pain. ?  Review of Systems  Negative except those mentioned in HPI.  Physical Exam  Vitals & Measurements  T:?36.8? ?C ?(Oral)? BP:?122/71? SpO2:?97%? WT:?87.50?kg? WT:?87.50?kg?  HR: 64bpm  General: Alert and oriented, No acute distress, well groomed, appears stated age  Eye: Pupils are equal, round and reactive to light, Extraocular movements are intact. Wearing glasses.  HENT: Supple, no thyroid enlargement, no pharyngeal erythema, no lymphadenopathy, tympanic membranes clear,?boggy nasal mucosa,?no sinus tenderness  Respiratory: Lungs are clear to auscultation, Breath sounds are equal, Symmetrical chest wall expansion.  Cardiovascular: Normal rate, Regular rhythm, No murmur appreciated, Good pulses equal in all extremities.  Gastrointestinal: Soft, nontender, positive bowel sounds in all 4 quadrants.  Neurologic: Alert, Oriented. Normal Gait. No gross neurological deficits noted. Sensation intact.  Psychiatric: Cooperative, Appropriate mood & affect.?  Pleasant demeanor.  Integumentary: Forehead and balding scalp with extensive photodamage.  Assessment/Plan  Anxiety(  Confirmed  )  ?- controlled, continue  CAD (coronary artery disease)  ?Continue to follow up ?with cardiology  Continue?Brilinta 90 mg twice a day,?daily aspirin,?atorvastatin,?metoprolol, and?lisinopril  HTN (hypertension)  ?controlled- continue metoprolol and lisinopril  Hyperlipidemia  ?FLP ordered today- continue atorvastatin  Low testosterone  ?PSA and Testosterone level ordered today  continue testosterone cypionate 50 mg/mL intramuscular solution, 100 mg,? IM, q1wk  RTC 6 months, annual labs ordered today   Problem List/Past Medical History  Anxiety(  Confirmed  )  CAD (coronary artery disease)  HTN (hypertension)(  Confirmed  )  Hyperlipidemia  Testicular hypofunction(  Confirmed  )  Procedure/Surgical History  Dilation of Coronary Artery, Two Sites with Drug-eluting Intraluminal Device, Percutaneous Approach (09/12/2016), Fluoroscopy of Multiple Coronary Arteries using Low Osmolar Contrast (09/12/2016), Fluoroscopy of Multiple Coronary Artery Bypass Grafts using Low Osmolar Contrast (09/12/2016), Fluoroscopy of Right Heart using Low Osmolar Contrast (09/12/2016), Measurement of Cardiac Sampling and Pressure, Left Heart, Percutaneous Approach (09/12/2016), Total Hip Arthroplasty (Right) (05/13/2014), Total hip replacement (05/13/2014), CABG x 2 - Coronary artery bypass grafts x 2 (01/01/2010), CABG x 4 - Coronary artery bypass grafts x 4 (06/01/1999), cab, Emergency CABG, Lithotripsy, right hip surgery, Tonsillectomy, Tonsillectomy and adenoidectomy, Uvulectomy.  Medications  Inpatient  No active inpatient medications  Home  aspirin 81 mg oral tablet, CHEWABLE, 81 mg, 1 tab(s), Oral, Daily  atorvastatin 40 mg oral tablet, See Instructions, 3 refills,? ?Still taking, not as prescribed: patient takes 2 for total 80mg daily.  Brilinta 90 mg oral tablet, 1 tablet, Oral, BID, 11  refills  Claritin 10 mg oral tablet, 10 mg, 1 tab(s), Oral, Daily, 3 refills  Depo-Testosterone 100 mg/mL intramuscular solution, 50 mg, 0.5 mL, IM, qWeek, 3 refills  DULoxetine 60 mg oral delayed release capsule, See Instructions, 2 refills,? ?Still taking, not as prescribed: does not take every day  Efudex 5% topical cream, 1 tri, TOP, BID, 3 refills  Flonase 50 mcg/inh nasal spray, 1 spray(s), Nasal, BID, 3 refills  KlonoPIN 1 mg oral tablet, 1 mg, 1 tab(s), Oral, q12hr, PRN, 3 refills  lisinopril 5 mg oral tablet, 5 mg, 1 tab(s), Oral, Daily, 3 refills  metoprolol tartrate 25 mg oral tab, 25 mg, 1 tab(s), Oral, BID, 3 refills  testosterone cypionate 100 mg/mL intramuscular solution, 100 mg, 1 mL, IM, q2wk  Viagra 100 mg oral tablet, See Instructions, 2 refills  Allergies  No Known Medication Allergies  Tomatoes  Social History  Alcohol  Current, Liquor, 3-5 times per week  Substance Abuse - Denies Substance Abuse  Never  Tobacco - Denies Tobacco Use  Former smoker, Cigarettes  Family History  Asthma.: Negative: Father.  CAD (coronary artery disease): Negative: Father.  COPD: Negative: Father.  Cancer: Negative: Father.  Cataract.: Negative: Father.  DM (diabetes mellitus): Negative: Father.  Depression.: Negative: Father.  Hypertension.: Mother.  Sickle cell disease.: Negative: Father.  Stroke: Father.  Tobacco use.: Negative: Father.      [ x ] I reviewed the case and agree with the findings and plan as documented in the residents note.

## 2022-05-03 NOTE — HISTORICAL [facility] CERNER
This is a historical note converted from Suraj. Formatting and pictures may have been removed.  Please reference Suraj for original formatting and attached multimedia. Admission Information  Cardiology Discharge Summary [facility]  Card[facility]  ?   Patient: Nikolas Gutierrez  : 1950  FIN: [id number]  Admit Date: 18  Discharge Date: 18  Primary MD: Devon  Discharge MD: FLAQUITO Alex, ANP-C  Hospital Course  Admit dx: Angina class III, abnormal nuclear perfusion  1. native CAD s/p PCI/laser atherectomy RCA (see below)  1.1 hx CABG/PCI  2. Hypertension  3.? Dyslipidemia  4. 2018 echo EF 49%, DD, mild MR  ?  Plan:  1.? Stable post PCI will plan for discharge home?today with follow-up in 7-10 days. ?Continue?dual antiplatelet therapy with Brilinta and aspirin. ?Continue statin.  2.? Continue beta-blocker, CCB, Imdur.? Instructed patient?to avoid use of Viagra on days that he has taken Imdur.  3.? Continue statin.? Patient states that he takes it every other day secondary?to myalgias.  ?  Procedure:  LHC: 2018 LVEDP between 25-29 mmHg no AV gradient no AMS; LV gram shows an EF of 40%, there is an area of the posterobasal/inferoapical akinesis with good contractility of the anterobasal, anterolateral segments.? Bighorn shows mild hypokinesis.? Moderate MR.? Left main 100% occluded, circumflex and LAD were only visualized with the graft angiography, RCA is large and dominant proximal PDA 99% occluded in the proximal right posterior lateral is also 99% occluded.? 80% stenosis of the distal RCA.? PTCA of RCA, involving laser atherectomy of the posterior descending artery, balloon angioplasty of the right PDA and right posterolateral branches with simultaneous kissing stenting, and placement of additional stent to the distal RCA ELYSIA.? Left angiography: SVG to the diagonal 100% occluded chronically, SVG to the OM left posterior lateral branches a Y graft with one-on-one  providing flow to the OM and a one year longer providing flow to the left posterolateral branch both arms are patent with good flow, LIMA to LAD is widely patent with no significant disease.? A temporary pacemaker was also utilized  Physical Exam  Vitals & Measurements  T:?36.9? ?C (Oral)? TMIN:?36.5? ?C (Oral)? TMAX:?36.9? ?C (Oral)? HR:?91(Peripheral)? RR:?18? BP:?142/86? SpO2:?97%? WT:?87.6?kg?  General: No acute distress. Well developed. Alert and Cooperative.  Eyes: extraocular muscles intact.  HENT: External ears normal. Nose appears normal and without discharge. Normal dentition. Moist mucous membranes.  Neck: Supple. Non-tender.  Respiratory: Clear to auscultation bilaterally without crackles, wheezing.  Cardiovascular: Regular rate and rhythm. Normal S1 and S2.  Pulses: Normal peripheral pulses. 2+?bilateral radial pulse.  Gastrointestinal: Soft/nontender/nondistended. Normal bowel sounds.  Musculoskeletal: No significant joint abnormality or deformity.?No edema bilateral lower extremities. no bleeding or hematoma to bilateral groins  Integumentary: Intact. Normal turgor. No rashes.  Neurologic: Nonfocal. Strength intact and symmetric bilaterally.  Psych: Normal mood. Normal affect. Normal judgement.  All other ROS negative.  ?  Sodium 142 potassium 5.3 chloride 105 CO2 31 BUN 15 creatinine 1.16 AST 21 ALT 25  Cholesterol 139 HDL 29 triglycerides 68 LDL 96  WBC 7.0 Hgb 17.2 HCT 53.3 platelet 154  Discharge Plan  Hospital Follow up [facility] clinic: 8/6/18 at 11:10 am with Dr. Gates  Diet: cardiac  Call office with any question, problems, or concerns.  Warning: Never stop plavix (clopidogrel), effient (prasugrel), or brilinta (ticagrelor)?without first speaking to a [facility] provider even if another physician or surgeon insists for it to be stopped for a procedure.  ?   Allergies: NkA, tomatoes  ?   Medications:?  Aspirin 81 mg daily  Brilinta 90 mg twice daily  Atorvastatin 40 mg every evening  Imdur 30 mg  daily  Lisinopril 5 mg daily  Metoprolol 25 mg twice daily  Klonopin 1 mg as directed  Fluoxetine 60 mg daily  Fluticasone 50 mics nasal spray twice daily  Gabapentin 300 mg daily  Loratadine 10 mg daily  Depo testosterone 50 mg once a week  Patient Discharge Condition  Stable  Discharge Disposition  Home

## 2022-05-03 NOTE — HISTORICAL [facility] CERNER
This is a historical note converted from [employer]. Formatting and pictures may have been removed.  Please reference [employer] for original formatting and attached multimedia. Chief Complaint  routine F/U HTN, right kidney pain  History of Present Illness  69 yo f/u  ?  right kidney pain - 3 weeks wax and wanes, no food association, occurs with rest and exercise,?history of kidney stones most recently 2010, multiple past lithotripsy most recently 2007, drinking 3 12 oz bottles a day. Taking Aleve prn.  ?  HTN - compliant with meds, at goal.  ?  CAD- following with Dr. Gates, no chest pain or SOB?since last stent in 03/2019.  ?  Low Testosterone - compliant with 50 mg q3 weeks, due for PSA and Lipid Panel  ?  Anxiety?- still under a lot of stress at work and home but well controlled on Clonazepam 1 mg BID.  ?  AK - multiple Ak on face, biopsy proven, improving on Effudex cream  ?  Colon Cx Screening- not cleared until 10/19 for colonoscopy per Cardiologist. Lost FIT test from last visit.  Review of Systems  Constitutional: no fever  Eye: no scleral icterus  ENMT: no sore throat  Respiratory: no cough, no shortness of breath  Cardiovascular: no chest pain, no palpitations, no edema  Gastrointestinal: no nausea, vomiting, or diarrhea. No abdominal pain  Integumentary: see hpi  Physical Exam  Vitals & Measurements  T:?36.4? ?C (Oral)? HR:?70(Peripheral)? RR:?18? BP:?119/68? SpO2:?95%?  HT:?172?cm? WT:?88?kg? BMI:?29.75?  General: appears well, in no acute distress  Eye: no scleral icterus  HENT: oropharynx without erythema/exudate, oropharynx and nasal mucosal surfaces moist, fair dentition  Neck: full range of motion, no thyromegaly or lymphadenopathy, no carotid bruit  Respiratory: clear to auscultation bilaterally, nonlabored respirations  Cardiovascular: regular rate and rhythm without murmurs, gallops or rubs  Gastrointestinal:?soft, non-tender, non-distended  Genitourinary: no CVA tenderness to palpation  Integumentary:  Diffuse chronic photodamage  Assessment/Plan  1.?Colon cancer screening?Z12.11  FIT provided again  Ordered:  Occult Blood Fecal Immunoassay, Routine collect, Stool, Order for future visit, *Est. 08/12/19 3:00:00 CDT, *Est. Stop date 08/12/19 3:00:00 CDT, Nurse collect, Colon cancer screening, Print Label By Order Location  ?  2.?Right flank pain?R10.9  Encouraged increased water intake  -UA ordered?if has RBC will?order CT without contrast  ?  3.?HTN (hypertension)?I10  Continue current meds  ?  4.?CAD (coronary artery disease)?I25.10  ?Continue to follow with cardiologist to continue current meds  ?  5.?AK (actinic keratosis)?L57.0  ?Improving, continue Efudex  ?  6.?Anxiety(  Confirmed  )?F41.9  ?Well-controlled, continue clonazepam 1 mg twice daily  ?  Orders:  atorvastatin, 10 mg = 1 tab(s), Oral, Daily, # 30 tab(s), 0 Refill(s), Pharmacy: [facility]  clonazePAM, 1 mg = 1 tab(s), Oral, BID, # 60 tab(s), 2 Refill(s), Pharmacy: [facility]  lisinopril, 5 mg = 1 tab(s), Oral, Daily, # 90 tab(s), 3 Refill(s), Pharmacy: [facility]  metoprolol, 25 mg = 1 tab(s), Oral, BID, # 180 tab(s), 3 Refill(s), Pharmacy: [facility]  sildenafil, 100 mg = 1 tab(s), Oral, Daily, PRN PRN erectile dysfunction, 1 hour before sexual activity, # 30 tab(s), 2 Refill(s), Pharmacy: [facility], Patient Education Provided, Patient Verbalizes Understanding  testosterone, 50 mg, IM, q3wk, # 10 mL, 11 Refill(s), Pharmacy: [facility]  Prostate Specific Antigen, Routine collect, 08/12/19 12:21:00 CDT, Blood, Order for future visit, Stop date 08/12/19 12:21:00 CDT, Lab Collect, Screening for prostate cancer, 08/12/19 12:21:00 CDT  RTC 4 months   Problem List/Past Medical History  Ongoing  ACS (acute coronary syndrome)  AK (actinic keratosis)  Anxiety(  Confirmed  )  BCC (basal cell carcinoma), leg  CAD (coronary artery disease)  Erectile dysfunction  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  History of basal cell carcinoma of skin  History of squamous cell carcinoma of skin  HTN (hypertension)(  Confirmed  )  Joint pain(  Confirmed  )  Kidney stones  Nabothian cyst  Osteoarthritis  Seborrheic keratoses  Sleep apnea  Testicular hypofunction(  Confirmed  )  Wears glasses  Historical  Basal cell carcinoma  Diverticulitis  HTN - Hypertension  Hyperlipidemia  Intervertebral disc degeneration  NSTEMI - Non-ST segment elevation MI  SOB - Shortness of breath  Procedure/Surgical History  Dilation of Coronary Artery, One Artery, Percutaneous Approach (03/13/2019)  Fluoroscopy of Left Internal Mammary Bypass Graft using Low Osmolar Contrast (03/13/2019)  Fluoroscopy of Multiple Coronary Arteries using Low Osmolar Contrast (03/13/2019)  Fluoroscopy of Multiple Coronary Artery Bypass Grafts using Low Osmolar Contrast (03/13/2019)  Measurement of Cardiac Sampling and Pressure, Left Heart, Percutaneous Approach (03/13/2019)  Dilation of Coronary Artery, One Artery, Percutaneous Approach (11/14/2018)  Extirpation of Matter from Coronary Artery, One Artery, Percutaneous Approach (11/14/2018)  Fluoroscopy of Left Heart using Low Osmolar Contrast (11/14/2018)  Fluoroscopy of Multiple Coronary Arteries using Low Osmolar Contrast (11/14/2018)  Measurement of Cardiac Sampling and Pressure, Left Heart, Percutaneous Approach (11/14/2018)  Catheter placement in coronary artery(s) for coronary angiography, including intraprocedural injection(s) for coronary angiography, imaging supervision and interpretation; with left heart catheterization including intraprocedural injection(s) for left liz (07/25/2018)  Dilation of Coronary Artery, One Artery with Drug-eluting Intraluminal Device, Percutaneous Approach (07/25/2018)  Extirpation of Matter from Coronary Artery, One Artery, Percutaneous Approach (07/25/2018)  Fluoroscopy of Left Heart using Other Contrast  (07/25/2018)  Fluoroscopy of Left Internal Mammary Bypass Graft using Other Contrast (07/25/2018)  Fluoroscopy of Multiple Coronary Arteries using Other Contrast (07/25/2018)  Fluoroscopy of Multiple Coronary Artery Bypass Grafts using Other Contrast (07/25/2018)  Measurement of Cardiac Sampling and Pressure, Left Heart, Percutaneous Approach (07/25/2018)  Percutaneous transcatheter placement of drug-eluting intracoronary stent(s), with coronary angioplasty when performed; each additional branch of a major coronary artery (list separately in addition to code for primary procedure) (07/25/2018)  Percutaneous transluminal coronary angioplasty; each additional branch of a major coronary artery (List separately in addition to code for primary procedure) (07/25/2018)  Percutaneous transluminal coronary atherectomy, with coronary angioplasty when performed; single major coronary artery or branch (07/25/2018)  Catheter placement in coronary artery(s) for coronary angiography, including intraprocedural injection(s) for coronary angiography, imaging supervision and interpretation; with left heart catheterization including intraprocedural injection(s) for left liz (11/09/2017)  Dilation of Coronary Artery, Two Arteries with Two Drug-eluting Intraluminal Devices, Percutaneous Approach (11/09/2017)  Fluoroscopy of Left Heart using Low Osmolar Contrast (11/09/2017)  Fluoroscopy of Left Internal Mammary Bypass Graft using Low Osmolar Contrast (11/09/2017)  Fluoroscopy of Multiple Coronary Arteries using Low Osmolar Contrast (11/09/2017)  Fluoroscopy of Multiple Coronary Artery Bypass Grafts using Low Osmolar Contrast (11/09/2017)  Measurement of Cardiac Sampling and Pressure, Left Heart, Percutaneous Approach (11/09/2017)  Percutaneous transcatheter placement of drug eluting intracoronary stent(s), with coronary angioplasty when performed; single major coronary artery or branch (11/09/2017)  Percutaneous transcatheter placement  of drug-eluting intracoronary stent(s), with coronary angioplasty when performed; each additional branch of a major coronary artery (list separately in addition to code for primary procedure) (11/09/2017)  Dilation of Coronary Artery, Two Sites with Drug-eluting Intraluminal Device, Percutaneous Approach (09/12/2016)  Fluoroscopy of Multiple Coronary Arteries using Low Osmolar Contrast (09/12/2016)  Fluoroscopy of Multiple Coronary Artery Bypass Grafts using Low Osmolar Contrast (09/12/2016)  Fluoroscopy of Right Heart using Low Osmolar Contrast (09/12/2016)  Measurement of Cardiac Sampling and Pressure, Left Heart, Percutaneous Approach (09/12/2016)  Total Hip Arthroplasty (Right) (05/13/2014)  Right total hip arthroplasty (05/13/2014)  CABG x 2 - Coronary artery bypass grafts x 2 (01/01/2010)  CABG x 4 - Coronary artery bypass grafts x 4 (06/01/1999)  Insertion of coronary artery stent  Lithotripsy  skin CA  Tonsillectomy and adenoidectomy  Uvulectomy   Medications  aspirin 81 mg oral tablet, CHEWABLE, 81 mg= 1 tab(s), Oral, Daily  atorvastatin 10 mg oral tablet, 10 mg= 1 tab(s), Oral, Daily  clonazePAM 1 mg oral tablet, 1 mg= 1 tab(s), Oral, BID, 2 refills  Efudex 5% topical cream, 1 tri, TOP, BID, 3 refills  fluticasone 50 mcg/inh nasal spray, 50 mcg, Nasal, Daily  ISOSORBIDE MONONIT ER 30 MG TB, 30 mg= 1 tab(s), Oral, Daily  lisinopril 5 mg oral tablet, 5 mg= 1 tab(s), Oral, Daily, 3 refills  loratadine 10 mg oral tablet, 10 mg= 1 tab(s), Oral, Daily, 3 refills  Metoprolol Tartrate 25 mg oral tablet, 25 mg= 1 tab(s), Oral, BID, 3 refills  nitroglycerin 0.4 mg sublingual TAB, 0.4 mg= 1 tab(s), SL, q5min, PRN, 5 refills  Plavix 75 mg oral tablet, 75 mg= 1 tab(s), Oral, Daily,? ?Investigating  RANEXA  MG TABLET, 500 mg= 1 tab(s), Oral, BID  sildenafil 100 mg oral tablet, 100 mg= 1 tab(s), Oral, Daily, PRN, 2 refills  testosterone cypionate 100 mg/mL intramuscular solution, 50 mg, IM, q3wk, 11  refills  Xarelto 2.5 mg oral tablet  Allergies  No Known Medication Allergies  Tomatoes?(Blisters)  Social History  Abuse/Neglect  No, No, Yes, 08/12/2019  Alcohol  Current, Wine, Liquor, 3-5 times per week, 11/09/2017  Employment/School  Employed, Operates hazardous equipment: No., 12/26/2018  Exercise  Exercise frequency: Daily., 12/26/2018  Home/Environment  Lives with Children, Spouse. Alcohol abuse in household: No. Substance abuse in household: Yes. Smoker in household: No. Feels unsafe at home: No. Family/Friends available for support: Yes. Concern for family members at home: Yes. Major illness in household: Yes. Financial concerns: No., 12/26/2018  Nutrition/Health  Regular, Wants to lose weight: No. Sleeping concerns: Yes. Feels highly stressed: Yes., 12/26/2018  Sexual  Sexually active: Yes., 12/26/2018  Substance Use - Denies Substance Abuse, 05/06/2014  Never, 04/23/2019  Tobacco - Denies Tobacco Use, 05/06/2014  Former smoker, quit more than 30 days ago, No, 08/12/2019  Family History  Asthma.: Negative: Father.  CAD (coronary artery disease): Negative: Father.  COPD: Negative: Father.  Cancer: Negative: Father.  Cataract.: Negative: Father.  DM (diabetes mellitus): Negative: Father.  Depression.: Negative: Father.  Hypertension.: Mother.  Sickle cell disease.: Negative: Father.  Stroke: Father.  Tobacco use.: Negative: Father.  Immunizations  Vaccine Date Status   influenza virus vaccine, inactivated 08/14/2017 Recorded   pneumococcal 13-valent conjugate vaccine 01/09/2017 Given   tetanus/diphtheria/pertussis, acel(Tdap) 08/22/2014 Recorded   pneumococcal vacc 08/22/2014 Recorded   Health Maintenance  Health Maintenance  ???Pending?(in the next year)  ??? ??OverDue  ??? ? ? ?Pneumococcal Vaccine due??and every?  ??? ? ? ?Smoking Cessation due??01/01/19??and every 1??year(s)  ??? ??Due?  ??? ? ? ?Colorectal Screening (Senior Wellness) due??08/12/19??and every?  ??? ? ? ?Hypertension Management-Education  due??08/12/19??and every 1??year(s)  ??? ??Due In Future?  ??? ? ? ?Advance Directive not due until??01/01/20??and every 1??year(s)  ??? ? ? ?Alcohol Misuse Screening not due until??01/01/20??and every 1??year(s)  ??? ? ? ?Cognitive Screening not due until??01/01/20??and every 1??year(s)  ??? ? ? ?Fall Risk Assessment not due until??01/01/20??and every 1??year(s)  ??? ? ? ?Functional Assessment not due until??01/01/20??and every 1??year(s)  ??? ? ? ?Geriatric Depression Screening not due until??01/01/20??and every 1??year(s)  ??? ? ? ?Obesity Screening not due until??01/01/20??and every 1??year(s)  ??? ? ? ?TB Skin Test not due until??02/11/20??and every 1??year(s)  ??? ? ? ?Hypertension Management-BMP not due until??03/13/20??and every 1??year(s)  ??? ? ? ?Aspirin Therapy for CVD Prevention not due until??03/14/20??and every 1??year(s)  ??? ? ? ?ADL Screening not due until??04/16/20??and every 1??year(s)  ??? ? ? ?Lung Cancer Screening not due until??05/02/20??and every 1??year(s)  ??? ? ? ?Hypertension Management-Blood Pressure not due until??08/11/20??and every 1??year(s)  ???Satisfied?(in the past 1 year)  ??? ??Satisfied?  ??? ? ? ?ADL Screening on??04/16/19.??Satisfied by Mackenzie Kirkland LPN  ??? ? ? ?Advance Directive on??08/12/19.??Satisfied by Faiza Todd  ??? ? ? ?Alcohol Misuse Screening on??04/16/19.??Satisfied by Mackenzie Kirkland LPN  ??? ? ? ?Aspirin Therapy for CVD Prevention on??03/14/19.??Satisfied by Nadia Brown RN.  ??? ? ? ?Blood Pressure Screening on??08/12/19.??Satisfied by Faiza Todd  ??? ? ? ?Body Mass Index Check on??08/12/19.??Satisfied by Faiza Todd  ??? ? ? ?Cognitive Screening on??04/16/19.??Satisfied by Mackenzie Kirkland LPN  ??? ? ? ?Coronary Artery Disease Maintenance-Lipid Lowering Therapy on??08/12/19.??Satisfied by Eugenio Osorio MD  ??? ? ? ?Depression Screening on??04/23/19.??Satisfied by Nicky Tejeda LPN  ??? ? ?  ?Diabetes Screening on??03/14/19.??Satisfied by Rizwana Gay.  ??? ? ? ?Fall Risk Assessment on??04/23/19.??Satisfied by Nicky Tejeda LPN  ??? ? ? ?Functional Assessment on??08/12/19.??Satisfied by Fiaza Todd  ??? ? ? ?Geriatric Depression Screening on??04/16/19.??Satisfied by Mackenzie Kirkland LPN  ??? ? ? ?Hypertension Management-Blood Pressure on??08/12/19.??Satisfied by Faiza Todd  ??? ? ? ?Lipid Screening on??11/14/18.??Satisfied by Kevin Sexton  ??? ? ? ?Lung Cancer Screening on??05/02/19.??Satisfied by Devon OSEGUERA, Eugenio MONTANEZ??Reason: Expectation Satisfied Elsewhere  ??? ? ? ?Obesity Screening on??08/12/19.??Satisfied by Faiza Todd  ??? ? ? ?TB Skin Test on??02/11/19.  ?      I saw and evaluated the patient. I have personally reviewed the review of systems (ROS) and past, family and social histories (PFSH) documented above by the resident.  I have reviewed the care furnished by the resident during the encounter, including a review of the patient?s medical history, the resident?s findings on physical examination, diagnosis, and the treatment plan.  I participated in the management of the patient and was immediately available throughout the encounter.?  I was physically present during all key portions of the service(s) provided with the resident.  Services were furnished in a primary care center located in the outpatient department of a [facility].  I discussed the patient with the resident and agree with the residents findings and plan as documented in the residents note. ?   ?? ? ? ??  * Final Report *  ?  Reason For Exam  Hx Empyema, Liver Cyst;Other (please specify)  ?  Radiology Report  Indication: Follow-up liver cyst, chronic empyema at left lung base  ?  FINDINGS: Continuous axial images were performed through the abdomen  from the level of the diaphragm to the iliac crest both before and  after administration of IV contrast. Standard oral  contrast was  initially given. This study is compared to a prior CT dated  11/16/2015.  ?  There is stable appearance of a fluid collection located posteriorly  in the pleural space of the left lower lung zone and adjacent  scarring/atelectasis in the left lower lobe consistent with a chronic  empyema. The lower lung zones otherwise clear bilaterally. The heart  is borderline enlarged but stable. Precontrast images demonstrate a  persistent small 2 mm nonobstructing urinary calculus in the  collecting system of the mid to lower left kidney.  ?  A clearly defined low-attenuation lesion located in the dome of the  liver measuring 1.9 cm in greatest diameter consistent with a simple  hepatic cyst is stable since November, 2015. No other parenchymal  abnormalities are identified in the liver. The spleen is mildly  enlarged measuring 12.5 cm in its craniocaudal dimension. The  pancreas, gallbladder and adrenals appear normal. The kidneys opacify  symmetrically with intravenous contrast demonstrating no evidence of  urinary obstruction or focal parenchymal abnormalities. No free  intraperitoneal fluid or lymph node enlargement is identified.  Opacified loops of both large and small bowel appear normal although a  few scattered colonic diverticula are visible projecting from the  walls of the descending segment of the colon.  ?  IMPRESSION:  1. Stable thick walled fluid collection located posteriorly in the  pleural space at the left lung base consistent with a chronic empyema.  2. Small, nonobstructing intrarenal calculus in the mid to lower left  kidney.  3. No acute CT abnormalities are identified in the abdomen. A 1.9 cm  simple cyst located superiorly in the right hepatic lobe is unchanged  since November, 2015.  4. Mild descending colon diverticulosis.  ?  ?  Signature Line  Electronically Signed By: Rajan OSEGUERA, Edawrdo CANTRELL  Date/Time Signed: 10/28/2016 08:45  ?  CONSIDER RE-IMAGING IF PAIN PERSISTS, EVEN IN ABSENCE  OF HEMATURIA

## 2022-05-03 NOTE — HISTORICAL [facility] CERNER
This is a historical note converted from Suraj. Formatting and pictures may have been removed.  Please reference Suraj for original formatting and attached multimedia. Chief Complaint  f/u visit, skin infection  History of Present Illness  70 yo M f/u  ?   Skin lesions - multiple scalp lesions began a few weeks ago, left dorsal hand lesion on began 6-8 weeks ago. Applying Effudex cream to scalp and hand. Has improvement of scalp but not hand.  ?  Lumbar DJD - Lumbar x-.Ray consistent with multilevel DJD.? Lumbar spine consistent with DJD with varying degrees of neuroforaminal stenosis. He went see Dr. Ahmadi and suggested PT but not able to offer anything else. patient would like a consultation with Dr. Pate.  ?  Anxiety?- home and work situations unimproved, clonazepam 1 mg bid not providing relief. ?COVID-19 causing?Significant stress at work. Continues to decline SSRI.  ?  CAD- No chest pain but is having SOB when having anxiety, Taking SL NG 2 tabs prn when SOB occurs and provides relief. He thinks SOB is anxiety and not Cardiac. no recent med changes from Dr. Gates.  ?   HTN - compliant with lisinopril 5 mg daily, isosorbide mononitrate 30 mg daily, metoprolol tartrate 25 mg twice daily, at goal.  ?   Low Testosterone - compliant with 50 mg IM?q3 weeks  Review of Systems  Constitutional: no fever  Eye: no scleral icterus  ENMT: no sore throat  Respiratory: no cough, no shortness of breath  Cardiovascular: see hpi  Gastrointestinal: no nausea, vomiting, or diarrhea. No abdominal pain  Integumentary: see hpi  Physical Exam  Vitals & Measurements  T:?36.7? ?C (Oral)? HR:?75(Peripheral)? RR:?20? BP:?109/68? SpO2:?95%?  HT:?172.72?cm? WT:?91.9?kg? BMI:?30.81?  General: appears well, in no acute distress  Eye: no scleral icterus  HENT: wearing mask  Neck: no LAD, no carotid bruit  Respiratory: RCTA, L with questionable faint crackles at the base, no wheezing in either lung, good air movement  bilaterally  Cardiovascular: regular rate and rhythm without murmurs, gallops or rubs, no edema  Gastrointestinal:?soft, non-tender, non-distended  Genitourinary: no CVA tenderness to palpation  Integumentary: a few Aks on scalp, scalp. Left hand: dorsal aspect with 1 cm circular raised scaly lesion, no surrounding erythema or drainage  Assessment/Plan  1.?Lumbar degenerative disc disease?M51.36  ?Referral to Dr. Pate made  ?  RTC 3 months for chronic conditions  Ordered:  Clinic Follow up, *Est. 10/13/20 3:00:00 CDT, F2F, Order for future visit, Lumbar degenerative disc disease  HTN (hypertension)(  Confirmed  )  AK (actinic keratosis)  CAD (coronary artery disease)  Low testosterone  Generalized anxiety disorder, [facility]...  ?  2.?HTN (hypertension)(  Confirmed  )?I10  ?BP at goal, continue current meds  Ordered:  Clinic Follow up, *Est. 10/13/20 3:00:00 CDT, F2F, Order for future visit, Lumbar degenerative disc disease  HTN (hypertension)(  Confirmed  )  AK (actinic keratosis)  CAD (coronary artery disease)  Low testosterone  Generalized anxiety disorder, [facility]...  ?  3.?AK (actinic keratosis)?L57.0  Appointment made for dermatology clinic on?6/23/2020  Ordered:  Clinic Follow up, *Est. 10/13/20 3:00:00 CDT, F2F, Order for future visit, Lumbar degenerative disc disease  HTN (hypertension)(  Confirmed  )  AK (actinic keratosis)  CAD (coronary artery disease)  Low testosterone  Generalized anxiety disorder, [facility]...  ?  4.?CAD (coronary artery disease)?I25.10  ?Keep follow-up with [facility]  -Increase statin to atorvastatin 40 mg daily, otherwise continue current meds  ?  Ordered:  atorvastatin, 40 mg = 1 tab(s), Oral, Daily, # 30 tab(s), 4 Refill(s), Pharmacy: [facility]/pharmacy #[id number], 172.72, cm, Height/Length Dosing, 07/13/20 8:55:00 CDT, 91.9, kg, Weight Dosing, 07/13/20 8:55:00 CDT  Clinic Follow up, *Est. 10/13/20 3:00:00 CDT, F2F, Order for future visit, Lumbar  degenerative disc disease  HTN (hypertension)(  Confirmed  )  AK (actinic keratosis)  CAD (coronary artery disease)  Low testosterone  Generalized anxiety disorder, [facility]...  ?  5.?Low testosterone?R79.89  ?Testosterone level ordered  PSA, lipid panel up-to-date.? Getting prn phlebotomy at [facility] q3 months  Ordered:  testosterone, 50 mg, IM, q3wk, # 10 mL, 11 Refill(s), [id number], 172.72, cm, Height/Length Dosing, 07/13/20 8:55:00 CDT, 91.9, kg, Weight Dosing, 07/13/20 8:55:00 CDT  Clinic Follow up, *Est. 10/13/20 3:00:00 CDT, F2F, Order for future visit, Lumbar degenerative disc disease  HTN (hypertension)(  Confirmed  )  AK (actinic keratosis)  CAD (coronary artery disease)  Low testosterone  Generalized anxiety disorder, [facility]...  Testosterone Level Total, Routine collect, 07/13/20 12:07:00 CDT, Blood, Stop date 07/13/20 12:07:00 CDT, Lab Collect, Low testosterone, 07/13/20 12:07:00 CDT  ?  6.?Generalized anxiety disorder?F41.1  ?Continue?clonazepam 1 mg twice daily  -Prescribed additional?1 mg tablet to take PRN?up to 3 times daily?total  Ordered:  Clinic Follow up, *Est. 10/13/20 3:00:00 CDT, F2F, Order for future visit, Lumbar degenerative disc disease  HTN (hypertension)(  Confirmed  )  AK (actinic keratosis)  CAD (coronary artery disease)  Low testosterone  Generalized anxiety disorder, [facility]...  ?  Orders:  aspirin, 81 mg = 1 tab(s), Oral, Daily, # 30 tab(s), 5 Refill(s), Pharmacy: [facility]/pharmacy #[id number], 172.72, cm, Height/Length Dosing, 07/13/20 8:55:00 CDT, 91.9, kg, Weight Dosing, 07/13/20 8:55:00 CDT  clonazePAM, See Instructions, PRN PRN anxiety, 1 tab(s) Oral BID 30 day(s), # 90 tab(s), 0 Refill(s), Pharmacy: [facility]/pharmacy #[id number], 172.72, cm, Height/Length Dosing, 07/13/20 8:55:00 CDT, 91.9, kg, Weight Dosing, 07/13/20 8:55:00 CDT  clonazePAM, See Instructions, PRN PRN anxiety, 1 tab(s) Oral BID with 1 extra daily as  needed, # 90 tab(s), 0 Refill(s), [id number], 172.72, cm, Height/Length Dosing, 07/13/20 8:55:00 CDT, 91.9, kg, Weight Dosing, 07/13/20 8:55:00 CDT  clonazePAM, See Instructions, PRN PRN anxiety, 1 tab(s) Oral BID with 1 extra daily as needed, # 90 tab(s), 0 Refill(s), [id number], 172.72, cm, Height/Length Dosing, 07/13/20 8:55:00 CDT, 91.9, kg, Weight Dosing, 07/13/20 8:55:00 CDT  clopidogrel, 75 mg = 1 tab(s), Oral, Daily, # 30 tab(s), 11 Refill(s), [id number], 172.72, cm, Height/Length Dosing, 07/13/20 8:55:00 CDT, 91.9, kg, Weight Dosing, 07/13/20 8:55:00 CDT  lisinopril, 5 mg = 1 tab(s), Oral, Daily, # 90 tab(s), 3 Refill(s), [id number], 172.72, cm, Height/Length Dosing, 07/13/20 8:55:00 CDT, 91.9, kg, Weight Dosing, 07/13/20 8:55:00 CDT  loratadine, 10 mg = 1 tab(s), Oral, Daily, # 90 tab(s), 3 Refill(s), [id number], 172.72, cm, Height/Length Dosing, 07/13/20 8:55:00 CDT, 91.9, kg, Weight Dosing, 07/13/20 8:55:00 CDT  metoprolol, 25 mg = 1 tab(s), Oral, BID, # 180 tab(s), 3 Refill(s), [id number], 172.72, cm, Height/Length Dosing, 07/13/20 8:55:00 CDT, 91.9, kg, Weight Dosing, 07/13/20 8:55:00 CDT  Referrals  Ambulatory Referral, Specialty: Neurosurgery, Reason: DJD, Refer To: Provider Not Specified, Nikolas Pate MD, [address] [place] [address] Freddy Kevin, [phone]., Start: 07/13/20 10:30:00 CDT, Instructions: send clinic notes, Spine Xray and MRI reports, Lumbar degenerative disc disease  Clinic Follow up, *Est. 10/13/20 3:00:00 CDT, F2F, Order for future visit, Lumbar degenerative disc disease  HTN (hypertension)(  Confirmed  )  AK (actinic keratosis)  CAD (coronary artery disease)  Low testosterone  Generalized anxiety disorder, [facility]...   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  History of basal cell carcinoma of skin  History of squamous cell carcinoma of skin  HTN (hypertension)(  Confirmed  )  Joint pain(  Confirmed  )  Kidney stones  Low testosterone  Lumbar degenerative disc disease  Nabothian cyst  Neuroforaminal stenosis of lumbosacral spine  Osteoarthritis  Seborrheic keratoses  Sleep apnea  Testicular hypofunction(  Confirmed  )  Wears glasses  Historical  ACS (acute coronary syndrome)  Basal cell carcinoma  Diverticulitis  HTN - Hypertension  Hyperlipidemia  Intervertebral disc degeneration  NSTEMI - Non-ST segment elevation MI  SOB - Shortness of breath  Procedure/Surgical History  Catheter placement in coronary artery(s) for coronary angiography, including intraprocedural injection(s) for coronary angiography, imaging supervision and interpretation; with left heart catheterization including intraprocedural injection(s) for left liz (11/15/2019)  Dilation of Coronary Artery, Two Arteries, Percutaneous Approach (11/15/2019)  Extirpation of Matter from Coronary Artery, Two Arteries, Percutaneous Approach (11/15/2019)  Fluoroscopy of Left Heart using Low Osmolar Contrast (11/15/2019)  Fluoroscopy of Left Internal Mammary Bypass Graft using Low Osmolar Contrast (11/15/2019)  Fluoroscopy of Single Coronary Artery Bypass Graft using Low Osmolar Contrast (11/15/2019)  Measurement of Cardiac Pacemaker, External Approach (11/15/2019)  Measurement of Cardiac Sampling and Pressure, Left Heart, Percutaneous Approach (11/15/2019)  Percutaneous transluminal coronary atherectomy, with coronary angioplasty when performed; each additional branch of a major coronary artery (List separately in addition to code for primary procedure) (11/15/2019)  Percutaneous transluminal coronary atherectomy, with coronary angioplasty when performed; single major coronary artery or  branch (11/15/2019)  Dilation of Coronary Artery, One Artery, Percutaneous Approach (03/13/2019)  Fluoroscopy of Left Internal Mammary Bypass Graft using Low Osmolar Contrast (03/13/2019)  Fluoroscopy of Multiple Coronary Arteries using Low Osmolar Contrast (03/13/2019)  Fluoroscopy of Multiple Coronary Artery Bypass Grafts using Low Osmolar Contrast (03/13/2019)  Measurement of Cardiac Sampling and Pressure, Left Heart, Percutaneous Approach (03/13/2019)  Dilation of Coronary Artery, One Artery, Percutaneous Approach (11/14/2018)  Extirpation of Matter from Coronary Artery, One Artery, Percutaneous Approach (11/14/2018)  Fluoroscopy of Left Heart using Low Osmolar Contrast (11/14/2018)  Fluoroscopy of Multiple Coronary Arteries using Low Osmolar Contrast (11/14/2018)  Measurement of Cardiac Sampling and Pressure, Left Heart, Percutaneous Approach (11/14/2018)  Catheter placement in coronary artery(s) for coronary angiography, including intraprocedural injection(s) for coronary angiography, imaging supervision and interpretation; with left heart catheterization including intraprocedural injection(s) for left liz (07/25/2018)  Dilation of Coronary Artery, One Artery with Drug-eluting Intraluminal Device, Percutaneous Approach (07/25/2018)  Extirpation of Matter from Coronary Artery, One Artery, Percutaneous Approach (07/25/2018)  Fluoroscopy of Left Heart using Other Contrast (07/25/2018)  Fluoroscopy of Left Internal Mammary Bypass Graft using Other Contrast (07/25/2018)  Fluoroscopy of Multiple Coronary Arteries using Other Contrast (07/25/2018)  Fluoroscopy of Multiple Coronary Artery Bypass Grafts using Other Contrast (07/25/2018)  Measurement of Cardiac Sampling and Pressure, Left Heart, Percutaneous Approach (07/25/2018)  Percutaneous transcatheter placement of drug-eluting intracoronary stent(s), with coronary angioplasty when performed; each additional branch of a major coronary artery (list separately in  addition to code for primary procedure) (07/25/2018)  Percutaneous transluminal coronary angioplasty; each additional branch of a major coronary artery (List separately in addition to code for primary procedure) (07/25/2018)  Percutaneous transluminal coronary atherectomy, with coronary angioplasty when performed; single major coronary artery or branch (07/25/2018)  Catheter placement in coronary artery(s) for coronary angiography, including intraprocedural injection(s) for coronary angiography, imaging supervision and interpretation; with left heart catheterization including intraprocedural injection(s) for left liz (11/09/2017)  Dilation of Coronary Artery, Two Arteries with Two Drug-eluting Intraluminal Devices, Percutaneous Approach (11/09/2017)  Fluoroscopy of Left Heart using Low Osmolar Contrast (11/09/2017)  Fluoroscopy of Left Internal Mammary Bypass Graft using Low Osmolar Contrast (11/09/2017)  Fluoroscopy of Multiple Coronary Arteries using Low Osmolar Contrast (11/09/2017)  Fluoroscopy of Multiple Coronary Artery Bypass Grafts using Low Osmolar Contrast (11/09/2017)  Measurement of Cardiac Sampling and Pressure, Left Heart, Percutaneous Approach (11/09/2017)  Percutaneous transcatheter placement of drug eluting intracoronary stent(s), with coronary angioplasty when performed; single major coronary artery or branch (11/09/2017)  Percutaneous transcatheter placement of drug-eluting intracoronary stent(s), with coronary angioplasty when performed; each additional branch of a major coronary artery (list separately in addition to code for primary procedure) (11/09/2017)  Dilation of Coronary Artery, Two Sites with Drug-eluting Intraluminal Device, Percutaneous Approach (09/12/2016)  Fluoroscopy of Multiple Coronary Arteries using Low Osmolar Contrast (09/12/2016)  Fluoroscopy of Multiple Coronary Artery Bypass Grafts using Low Osmolar Contrast (09/12/2016)  Fluoroscopy of Right Heart using Low Osmolar  Contrast (09/12/2016)  Measurement of Cardiac Sampling and Pressure, Left Heart, Percutaneous Approach (09/12/2016)  Total Hip Arthroplasty (Right) (05/13/2014)  Right total hip arthroplasty (05/13/2014)  CABG x 2 - Coronary artery bypass grafts x 2 (01/01/2010)  CABG x 4 - Coronary artery bypass grafts x 4 (06/01/1999)  Insertion of coronary artery stent  Lithotripsy  skin CA  Tonsillectomy and adenoidectomy  Uvulectomy   Medications  aspirin 81 mg oral tablet, CHEWABLE, 81 mg= 1 tab(s), Oral, Daily, 5 refills  atorvastatin 40 mg oral tablet, 40 mg= 1 tab(s), Oral, Daily, 4 refills  clonazePAM 1 mg oral tablet, See Instructions, PRN  clonazePAM 1 mg oral tablet, See Instructions, PRN  clonazePAM 1 mg oral tablet, See Instructions, PRN  Efudex 5% topical cream, 1 tri, TOP, BID, 3 refills  fluticasone 50 mcg/inh nasal spray, 50 mcg, Nasal, Daily  ISOSORBIDE MONONIT ER 30 MG TB, 30 mg= 1 tab(s), Oral, Daily  lisinopril 5 mg oral tablet, 5 mg= 1 tab(s), Oral, Daily, 3 refills  loratadine 10 mg oral tablet, 10 mg= 1 tab(s), Oral, Daily, 3 refills  Metoprolol Tartrate 25 mg oral tablet, 25 mg= 1 tab(s), Oral, BID, 3 refills  nitroglycerin 0.4 mg sublingual TAB, 0.4 mg= 1 tab(s), SL, q5min, PRN, 5 refills  Plavix 75 mg oral tablet, 75 mg= 1 tab(s), Oral, Daily, 11 refills  sildenafil 100 mg oral tablet, 100 mg= 1 tab(s), Oral, Daily, PRN, 2 refills  testosterone cypionate 100 mg/mL intramuscular solution, 50 mg, IM, q3wk, 11 refills  Allergies  Tomatoes?(Blisters)  Social History  Abuse/Neglect  No, No, Yes, 07/13/2020  Alcohol  Current, Liquor, Daily, Alcohol use interferes with work or home: No. Others hurt by drinking: No. Household alcohol concerns: No., 10/22/2019  Employment/School  Employed, 10/22/2019  Exercise  Exercise duration: 60. Exercise frequency: 5-6 times/week. Exercise type: Walking., 10/22/2019  Home/Environment  Lives with Children, Spouse, grandchildren. Living situation: Home/Independent.,  10/22/2019  Nutrition/Health  Regular, Low fat, Low sodium, Good, 10/22/2019  Sexual  Sexually active: Yes. Number of current partners 1. Sexual orientation: Straight or heterosexual. Gender Identity Identifies as male. No, Other sexual concerns: Erectile disfunction takes testosterone., 10/22/2019  Spiritual/Cultural  B[employer], No, 10/22/2019  Substance Use - Denies Substance Abuse, 05/06/2014  Never, 10/22/2019  Tobacco - Denies Tobacco Use, 05/06/2014  Never (less than 100 in lifetime), N/A, 07/13/2020  Family History  Asthma.: Negative: Father.  CAD (coronary artery disease): Negative: Father.  COPD: Negative: Father.  Cancer: Negative: Father.  Cataract.: Negative: Father.  DM (diabetes mellitus): Negative: Father.  Depression.: Negative: Father.  Hypertension.: Mother.  Sickle cell disease.: Negative: Father.  Stroke: Father.  Tobacco use.: Negative: Father.  Immunizations  Vaccine Date Status   influenza virus vaccine, inactivated 08/14/2017 Recorded   pneumococcal 13-valent conjugate vaccine 01/09/2017 Given   tetanus/diphtheria/pertussis, acel(Tdap) 08/22/2014 Recorded   pneumococcal vacc 08/22/2014 Recorded   Health Maintenance  Health Maintenance  ???Pending?(in the next year)  ??? ??OverDue  ??? ? ? ?Coronary Artery Disease Maintenance-Lipid Lowering Therapy due??and every?  ??? ? ? ?Advance Directive due??01/01/20??and every 1??year(s)  ??? ? ? ?Alcohol Misuse Screening due??01/01/20??and every 1??year(s)  ??? ? ? ?Lung Cancer Screening due??05/02/20??and every 1??year(s)  ??? ??Due?  ??? ? ? ?Colorectal Screening due??07/13/20??and every?  ??? ? ? ?Hypertension Management-Education due??07/13/20??and every 1??year(s)  ??? ? ? ?Influenza Vaccine due??07/13/20??and every?  ??? ? ? ?Medicare Annual Wellness Exam due??07/13/20??and every 1??year(s)  ??? ? ? ?Pneumococcal Vaccine due??07/13/20??and every?  ??? ? ? ?Zoster Vaccine due??07/13/20??and every?  ??? ??Refused?  ??? ? ? ?Smoking Cessation  due??01/01/20??and every 1??year(s)  ??? ??Due In Future?  ??? ? ? ?TB Skin Test not due until??12/04/20??and every 1??year(s)  ??? ? ? ?Cognitive Screening not due until??01/01/21??and every 1??year(s)  ??? ? ? ?Fall Risk Assessment not due until??01/01/21??and every 1??year(s)  ??? ? ? ?Functional Assessment not due until??01/01/21??and every 1??year(s)  ??? ? ? ?Obesity Screening not due until??01/01/21??and every 1??year(s)  ??? ? ? ?Hypertension Management-BMP not due until??04/28/21??and every 1??year(s)  ???Satisfied?(in the past 1 year)  ??? ??Satisfied?  ??? ? ? ?ADL Screening on??07/13/20.??Satisfied by Mackenzie Kirkland LPN  ??? ? ? ?Abdominal Aortic Aneurysm Screening on??09/06/19.??Satisfied by Ann Angelo  ??? ? ? ?Advance Directive on??11/15/19.??Satisfied by Cathy Velazquez RN.  ??? ? ? ?Aspirin Therapy for CVD Prevention on??07/13/20.??Satisfied by Eugenio Osorio MD  ??? ? ? ?Blood Pressure Screening on??07/13/20.??Satisfied by Mackenzie Kirkland LPN  ??? ? ? ?Body Mass Index Check on??07/13/20.??Satisfied by Mackenzie Kirkland LPN  ??? ? ? ?Cognitive Screening on??12/23/19.??Satisfied by Jemima Gregory LPN  ??? ? ? ?Coronary Artery Disease Maintenance-Antiplatelet Agent Prescribed on??07/13/20.??Satisfied by Eugenio Osorio MD  ??? ? ? ?Depression Screening on??07/13/20.??Satisfied by Mackenzie Kirkland LPN  ??? ? ? ?Diabetes Screening on??04/28/20.??Satisfied by Luigi Gay Jr.  ??? ? ? ?Fall Risk Assessment on??07/13/20.??Satisfied by Mackenzie Kirkland LPN  ??? ? ? ?Functional Assessment on??07/13/20.??Satisfied by Mackenzie Kirkland LPN  ??? ? ? ?Hypertension Management-Blood Pressure on??07/13/20.??Satisfied by Mackenzie Kirkland LPN  ??? ? ? ?Lipid Screening on??04/28/20.??Satisfied by Luigi Gay Jr.  ??? ? ? ?Obesity Screening on??07/13/20.??Satisfied by Mackenzie Kirkland LPN  ??? ? ? ?TB Skin Test on??12/04/19.  ??? ??Refused?  ??? ? ?  ?Smoking Cessation on??10/22/19.??Recorded by Jonna Castro??Reason: Patient Refuses  ?      ????I saw and evaluated the patient. I discussed with the resident and agree with the residents findings and plan as documented in the residents note.

## 2022-05-03 NOTE — HISTORICAL [facility] CERNER
This is a historical note converted from Suraj. Formatting and pictures may have been removed.  Please reference Suraj for original formatting and attached multimedia. Chief Complaint  Follow up; no complaints  History of Present Illness  69 yo WM  ?  CAD - negative Lexiscan with Dr. Gates cleared for Spine surgery. ?Cardiology okayed Viagra as long as he holds Imdur and/or sublingual nitroglycerin?the day of and day after?using?Viagra.? No recent changes to medications by cardiology  ?  Neck and lumbar spine DJD - Dr. Gonzalez advised surgery with timing at patients choosing  ?  CRC screening - Colonoscopy scheduled May 30  ?  Anxiety doing well on clonazepam 1 mg?3 times daily, continues to decline maintenance SSRI  ?  Recurrent kidney stones-tolerating Flomax without issue, increased?water intake?and no stone since last visit.  ?  Low testosterone-not in blood no longer taking?his phlebotomy?due to antiplatelets.? Current dosing I am?testosterone cypionate?50 mg every 3 weeks.??Works for 2 weeks and then wears off that third week.? Due for early a.m. check, last checked in July?and likely was not?trough. ?Patient has not gone back for lab order as instructed previously. ?Also due for H&H  Review of Systems  Constitutional:?Weight change  CV: No chest pain or palpitation  Physical Exam  Vitals & Measurements  T:?36.6? ?C (Oral)? HR:?78(Peripheral)? RR:?17? BP:?103/61? SpO2:?96%?  HT:?172.00?cm? WT:?90.050?kg? BMI:?30.44?  General: appears well, in no acute distress  Eye: no scleral icterus  Neck: supple, no lymphadenopathy, no carotid bruits  Respiratory: clear to auscultation bilaterally, nonlabored respirations  Cardiovascular: regular rate and rhythm without murmurs or?gallops, no edema  Gastrointestinal:?soft, non-tender, non-distended  Genitourinary: no suprapubic tenderness  Musculoskeletal: Back brace in place, slow movements?of neck and spine  Integumentary: CABG incision well-healed, no suspicious skin  lesions?on exposed skin  Psychological:?Pleasant and cooperative  Assessment/Plan  1.?Low testosterone?R79.89  Took last?injection yesterday  Check?early a.m. testosterone?5/17/2021  -May need to continue same dose and?increase frequency to?every 2 weeks or decreased dose and increase frequency to every 3 weeks  -CBC ordered?as well,?likely needs?as needed?therapeutic phlebotomy?will set up at [facility]?infusion center  [facility]?ordered  ?  RTC 3 months or sooner if needed  Ordered:  Clinic Follow up, *Est. 08/04/21 3:00:00 CDT, Order for future visit, Low testosterone  Lumbar degenerative disc disease  Anxiety(  Confirmed  )  CAD (coronary artery disease)  Colon cancer screening, [facility]  ?  2.?Lumbar degenerative disc disease?M51.36  ?Keep follow-up with Dr. Gonzalez,?likely will choose to do neck?fusion first  Ordered:  Clinic Follow up, *Est. 08/04/21 3:00:00 CDT, Order for future visit, Low testosterone  Lumbar degenerative disc disease  Anxiety(  Confirmed  )  CAD (coronary artery disease)  Colon cancer screening, [facility]  ?  3.?Anxiety(  Confirmed  )?F41.9  ?Continue clonazepam 1 mg?3 times daily  Urine drug test today,?controlled substance agreement signed today,  checked  Ordered:  Clinic Follow up, *Est. 08/04/21 3:00:00 CDT, Order for future visit, Low testosterone  Lumbar degenerative disc disease  Anxiety(  Confirmed  )  CAD (coronary artery disease)  Colon cancer screening, [facility]  Drug Screen Urine [facility], Routine collect, Urine, 05/04/21 16:57:00 CDT, Stop date 05/04/21 16:57:00 CDT, Nurse collect, Anxiety(  Confirmed  ), 05/04/21 16:57:00 CDT  ?  4.?CAD (coronary artery disease)?I25.10  Doing well, negative Lexiscan recently  Again reemphasized to hold Imdur/nitroglycerin day of and after using Viagra  Ordered:  Clinic Follow up, *Est. 08/04/21 3:00:00 CDT, Order for future visit, Low testosterone  Lumbar degenerative disc disease   Anxiety(  Confirmed  )  CAD (coronary artery disease)  Colon cancer screening, [facility]  ?  5.?Colon cancer screening?Z12.11  Keep appointment for colonoscopy on 5/30/2021  Ordered:  Clinic Follow up, *Est. 08/04/21 3:00:00 CDT, Order for future visit, Low testosterone  Lumbar degenerative disc disease  Anxiety(  Confirmed  )  CAD (coronary artery disease)  Colon cancer screening, [facility]  ?  Referrals  Clinic Follow up, *Est. 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  History of basal cell carcinoma of skin  History of squamous cell carcinoma of skin  HTN (hypertension)(  Confirmed  )  Joint pain(  Confirmed  )  Kidney stones  Low testosterone  Lumbar degenerative disc disease  Nabothian cyst  Neuroforaminal stenosis of lumbosacral spine  Osteoarthritis  Seborrheic keratoses  Sleep apnea  Testicular hypofunction(  Confirmed  )  Wears glasses  Historical  ACS (acute coronary syndrome)  Basal cell carcinoma  Diverticulitis  HTN - Hypertension  Hyperlipidemia  Intervertebral disc degeneration  NSTEMI - Non-ST segment elevation MI  SOB - Shortness of breath  Procedure/Surgical History  Catheter placement in coronary artery(s) for coronary angiography, including intraprocedural injection(s) for coronary angiography, imaging supervision and interpretation; with left heart catheterization including intraprocedural injection(s) for left liz (11/15/2019)  Dilation of Coronary Artery, Two Arteries, Percutaneous Approach  (11/15/2019)  Extirpation of Matter from Coronary Artery, Two Arteries, Percutaneous Approach (11/15/2019)  Fluoroscopy of Left Heart using Low Osmolar Contrast (11/15/2019)  Fluoroscopy of Left Internal Mammary Bypass Graft using Low Osmolar Contrast (11/15/2019)  Fluoroscopy of Single Coronary Artery Bypass Graft using Low Osmolar Contrast (11/15/2019)  Measurement of Cardiac Pacemaker, External Approach (11/15/2019)  Measurement of Cardiac Sampling and Pressure, Left Heart, Percutaneous Approach (11/15/2019)  Percutaneous transluminal coronary atherectomy, with coronary angioplasty when performed; each additional branch of a major coronary artery (List separately in addition to code for primary procedure) (11/15/2019)  Percutaneous transluminal coronary atherectomy, with coronary angioplasty when performed; single major coronary artery or branch (11/15/2019)  Dilation of Coronary Artery, One Artery, Percutaneous Approach (03/13/2019)  Fluoroscopy of Left Internal Mammary Bypass Graft using Low Osmolar Contrast (03/13/2019)  Fluoroscopy of Multiple Coronary Arteries using Low Osmolar Contrast (03/13/2019)  Fluoroscopy of Multiple Coronary Artery Bypass Grafts using Low Osmolar Contrast (03/13/2019)  Measurement of Cardiac Sampling and Pressure, Left Heart, Percutaneous Approach (03/13/2019)  Dilation of Coronary Artery, One Artery, Percutaneous Approach (11/14/2018)  Extirpation of Matter from Coronary Artery, One Artery, Percutaneous Approach (11/14/2018)  Fluoroscopy of Left Heart using Low Osmolar Contrast (11/14/2018)  Fluoroscopy of Multiple Coronary Arteries using Low Osmolar Contrast (11/14/2018)  Measurement of Cardiac Sampling and Pressure, Left Heart, Percutaneous Approach (11/14/2018)  Catheter placement in coronary artery(s) for coronary angiography, including intraprocedural injection(s) for coronary angiography, imaging supervision and interpretation; with left heart catheterization including  intraprocedural injection(s) for left liz (07/25/2018)  Dilation of Coronary Artery, One Artery with Drug-eluting Intraluminal Device, Percutaneous Approach (07/25/2018)  Extirpation of Matter from Coronary Artery, One Artery, Percutaneous Approach (07/25/2018)  Fluoroscopy of Left Heart using Other Contrast (07/25/2018)  Fluoroscopy of Left Internal Mammary Bypass Graft using Other Contrast (07/25/2018)  Fluoroscopy of Multiple Coronary Arteries using Other Contrast (07/25/2018)  Fluoroscopy of Multiple Coronary Artery Bypass Grafts using Other Contrast (07/25/2018)  Measurement of Cardiac Sampling and Pressure, Left Heart, Percutaneous Approach (07/25/2018)  Percutaneous transcatheter placement of drug-eluting intracoronary stent(s), with coronary angioplasty when performed; each additional branch of a major coronary artery (list separately in addition to code for primary procedure) (07/25/2018)  Percutaneous transluminal coronary angioplasty; each additional branch of a major coronary artery (List separately in addition to code for primary procedure) (07/25/2018)  Percutaneous transluminal coronary atherectomy, with coronary angioplasty when performed; single major coronary artery or branch (07/25/2018)  Catheter placement in coronary artery(s) for coronary angiography, including intraprocedural injection(s) for coronary angiography, imaging supervision and interpretation; with left heart catheterization including intraprocedural injection(s) for left liz (11/09/2017)  Dilation of Coronary Artery, Two Arteries with Two Drug-eluting Intraluminal Devices, Percutaneous Approach (11/09/2017)  Fluoroscopy of Left Heart using Low Osmolar Contrast (11/09/2017)  Fluoroscopy of Left Internal Mammary Bypass Graft using Low Osmolar Contrast (11/09/2017)  Fluoroscopy of Multiple Coronary Arteries using Low Osmolar Contrast (11/09/2017)  Fluoroscopy of Multiple Coronary Artery Bypass Grafts using Low Osmolar Contrast  (11/09/2017)  Measurement of Cardiac Sampling and Pressure, Left Heart, Percutaneous Approach (11/09/2017)  Percutaneous transcatheter placement of drug eluting intracoronary stent(s), with coronary angioplasty when performed; single major coronary artery or branch (11/09/2017)  Percutaneous transcatheter placement of drug-eluting intracoronary stent(s), with coronary angioplasty when performed; each additional branch of a major coronary artery (list separately in addition to code for primary procedure) (11/09/2017)  Dilation of Coronary Artery, Two Sites with Drug-eluting Intraluminal Device, Percutaneous Approach (09/12/2016)  Fluoroscopy of Multiple Coronary Arteries using Low Osmolar Contrast (09/12/2016)  Fluoroscopy of Multiple Coronary Artery Bypass Grafts using Low Osmolar Contrast (09/12/2016)  Fluoroscopy of Right Heart using Low Osmolar Contrast (09/12/2016)  Measurement of Cardiac Sampling and Pressure, Left Heart, Percutaneous Approach (09/12/2016)  Total Hip Arthroplasty (Right) (05/13/2014)  Right total hip arthroplasty (05/13/2014)  CABG x 2 - Coronary artery bypass grafts x 2 (01/01/2010)  CABG x 4 - Coronary artery bypass grafts x 4 (06/01/1999)  Insertion of coronary artery stent  Lithotripsy  skin CA  Tonsillectomy and adenoidectomy  Uvulectomy   Medications  aspirin 81 mg oral tablet, CHEWABLE, 81 mg= 1 tab(s), Oral, Daily, 1 refills  atorvastatin 40 mg oral tablet, See Instructions, 1 refills  clonazePAM 1 mg oral tablet, 1 mg= 1 tab(s), Oral, TID, PRN  clonazePAM 1 mg oral tablet, 1 mg= 1 tab(s), Oral, TID, PRN  clonazePAM 1 mg oral tablet, 1 mg= 1 tab(s), Oral, TID, PRN  Efudex 5% topical cream, 1 tri, TOP, BID, 3 refills  fluticasone 50 mcg/inh nasal spray, 50 mcg, Nasal, Daily  ISOSORBIDE MONONIT ER 30 MG TB, 30 mg= 1 tab(s), Oral, Daily  lisinopril 5 mg oral tablet, 5 mg= 1 tab(s), Oral, Daily, 1 refills  loratadine 10 mg oral tablet, 10 mg= 1 tab(s), Oral, Daily, 1 refills  Metoprolol  Tartrate 25 mg oral tablet, 25 mg= 1 tab(s), Oral, BID, 1 refills  nitroglycerin 0.4 mg sublingual TAB, 0.4 mg= 1 tab(s), SL, q5min, PRN, 5 refills  Plavix 75 mg oral tablet, 75 mg= 1 tab(s), Oral, Daily, 11 refills  sildenafil 100 mg oral tablet, See Instructions, PRN, 5 refills  tamsulosin 0.4 mg oral capsule, See Instructions, 1 refills  testosterone cypionate 100 mg/mL intramuscular solution, 50 mg, IM, q3wk  Allergies  Tomatoes?(Blisters)  Social History  Abuse/Neglect  No, No, Yes, 05/04/2021  Alcohol  Current, Liquor, Daily, Alcohol use interferes with work or home: No. Others hurt by drinking: No. Household alcohol concerns: No., 10/22/2019  Employment/School  Employed, 10/22/2019  Exercise  Exercise duration: 60. Exercise frequency: 5-6 times/week. Exercise type: Walking., 10/22/2019  Home/Environment  Lives with Children, Spouse, grandchildren. Living situation: Home/Independent., 10/22/2019  Nutrition/Health  Regular, Low fat, Low sodium, Good, 10/22/2019  Sexual  Sexually active: Yes. Number of current partners 1. Sexual orientation: Straight or heterosexual. Gender Identity Identifies as male. No, Other sexual concerns: Erectile disfunction takes testosterone., 10/22/2019  Spiritual/Cultural  Mosque, No, 10/22/2019  Substance Use - Denies Substance Abuse, 05/06/2014  Never, 10/22/2019  Tobacco - Denies Tobacco Use, 05/06/2014  Never (less than 100 in lifetime), N/A, 05/04/2021  Family History  Asthma.: Negative: Father.  CAD (coronary artery disease): Negative: Father.  COPD: Negative: Father.  Cancer: Negative: Father.  Cataract.: Negative: Father.  DM (diabetes mellitus): Negative: Father.  Depression.: Negative: Father.  Hypertension.: Mother.  Sickle cell disease.: Negative: Father.  Stroke: Father.  Tobacco use.: Negative: Father.  Immunizations  Vaccine Date Status   COVID-19 MRNA, LNP-S, PF- Pfizer 01/20/2021 Given   COVID-19 MRNA, LNP-S, PF- Pfizer 12/30/2020 Given   influenza virus vaccine,  inactivated 08/14/2017 Recorded   pneumococcal 13-valent conjugate vaccine 01/09/2017 Given   tetanus/diphtheria/pertussis, acel(Tdap) 08/22/2014 Recorded   pneumococcal vacc 08/22/2014 Recorded   Health Maintenance  Health Maintenance  ???Pending?(in the next year)  ??? ??OverDue  ??? ? ? ?Lung Cancer Screening due??05/02/20??and every 1??year(s)  ??? ? ? ?Influenza Vaccine due??10/01/20??and every 1??day(s)  ??? ? ? ?TB Skin Test due??12/04/20??and every 1??year(s)  ??? ? ? ?Advance Directive due??01/02/21??and every 1??year(s)  ??? ? ? ?Cognitive Screening due??01/02/21??and every 1??year(s)  ??? ??Due?  ??? ? ? ?Colorectal Screening due??05/04/21??Unknown Frequency  ??? ? ? ?Hypertension Management-Education due??05/04/21??and every 1??year(s)  ??? ? ? ?Medicare Annual Wellness Exam due??05/04/21??and every 1??year(s)  ??? ? ? ?Pneumococcal Vaccine due??05/04/21??Unknown Frequency  ??? ? ? ?Zoster Vaccine due??05/04/21??Unknown Frequency  ??? ??Refused?  ??? ? ? ?Smoking Cessation due??01/01/21??and every 1??year(s)  ??? ??Due In Future?  ??? ? ? ?ADL Screening not due until??07/13/21??and every 1??year(s)  ??? ? ? ?Hypertension Management-BMP not due until??12/09/21??and every 1??year(s)  ??? ? ? ?Obesity Screening not due until??01/01/22??and every 1??year(s)  ??? ? ? ?Alcohol Misuse Screening not due until??01/02/22??and every 1??year(s)  ??? ? ? ?Fall Risk Assessment not due until??01/02/22??and every 1??year(s)  ??? ? ? ?Functional Assessment not due until??01/02/22??and every 1??year(s)  ???Satisfied?(in the past 1 year)  ??? ??Satisfied?  ??? ? ? ?ADL Screening on??07/13/20.??Satisfied by Mackenzie Kirkland LPN  ??? ? ? ?Advance Directive on??11/23/20.??Satisfied by Marina Carrera  ??? ? ? ?Alcohol Misuse Screening on??01/29/21.??Satisfied by Luisa Huizar  ??? ? ? ?Aspirin Therapy for CVD Prevention on??05/04/21.??Satisfied by Devon OSEGUERA, Eugenio MONTANEZ  ??? ? ? ?Blood Pressure Screening  on??05/04/21.??Satisfied by Haseeb VASQUEZ Ender  ??? ? ? ?Body Mass Index Check on??05/04/21.??Satisfied by Haseeb VASQUEZ Ender  ??? ? ? ?Coronary Artery Disease Maintenance-Antiplatelet Agent Prescribed on??05/04/21.??Satisfied by Devon OSEGUERA, Eugenio MONTANEZ  ??? ? ? ?Depression Screening on??05/04/21.??Satisfied by Haseeb VASQUEZ Ender  ??? ? ? ?Fall Risk Assessment on??05/04/21.??Satisfied by Haseeb VASQUEZ Ender  ??? ? ? ?Functional Assessment on??05/04/21.??Satisfied by Haseeb VASQUEZ Ender  ??? ? ? ?Hypertension Management-Blood Pressure on??05/04/21.??Satisfied by Haseeb VASQUEZ Ender  ??? ? ? ?Influenza Vaccine on??11/23/20.??Satisfied by Marina Carrera  ??? ? ? ?Obesity Screening on??05/04/21.??Satisfied by Ender Membreno LPN  ?      I was present with the resident during the history and exam.  ?  [ x ] I discussed the case with the resident and agree with the findings and plan as documented in the residents note.   Testosterone level reviewed.? Will increase dose to 50 mg every 2 weeks instead of 50 mg every 3 weeks.? Rx sent to pharmacy.? Patient aware.

## 2022-05-23 ENCOUNTER — HOSPITAL ENCOUNTER (OUTPATIENT)
Dept: RADIOLOGY | Facility: HOSPITAL | Age: 72
Discharge: HOME | End: 2022-05-23
Attending: STUDENT IN AN ORGANIZED HEALTH CARE EDUCATION/TRAINING PROGRAM
Payer: MEDICARE

## 2022-05-23 ENCOUNTER — OFFICE VISIT (OUTPATIENT)
Dept: FAMILY MEDICINE | Facility: CLINIC | Age: 72
End: 2022-05-23
Payer: MEDICARE

## 2022-05-23 VITALS
HEART RATE: 76 BPM | RESPIRATION RATE: 18 BRPM | OXYGEN SATURATION: 98 % | TEMPERATURE: 97 F | HEIGHT: 68 IN | DIASTOLIC BLOOD PRESSURE: 71 MMHG | WEIGHT: 205.69 LBS | SYSTOLIC BLOOD PRESSURE: 108 MMHG | BODY MASS INDEX: 31.17 KG/M2

## 2022-05-23 DIAGNOSIS — R10.9 RIGHT FLANK PAIN: Primary | ICD-10-CM

## 2022-05-23 DIAGNOSIS — F41.1 GENERALIZED ANXIETY DISORDER: ICD-10-CM

## 2022-05-23 DIAGNOSIS — R10.9 ABDOMINAL PAIN, UNSPECIFIED ABDOMINAL LOCATION: ICD-10-CM

## 2022-05-23 DIAGNOSIS — M48.07 NEUROFORAMINAL STENOSIS OF LUMBOSACRAL SPINE: ICD-10-CM

## 2022-05-23 DIAGNOSIS — N28.1 BILATERAL RENAL CYSTS: ICD-10-CM

## 2022-05-23 DIAGNOSIS — M43.17 SPONDYLOLISTHESIS AT L5-S1 LEVEL: ICD-10-CM

## 2022-05-23 DIAGNOSIS — I25.10 ARTERIOSCLEROSIS OF CORONARY ARTERY: ICD-10-CM

## 2022-05-23 PROBLEM — M54.9 BACK PAIN: Status: ACTIVE | Noted: 2022-05-23

## 2022-05-23 PROBLEM — I21.4 ACUTE NON-ST ELEVATION MYOCARDIAL INFARCTION (NSTEMI): Status: ACTIVE | Noted: 2022-05-23

## 2022-05-23 PROBLEM — L82.1 BASAL CELL PAPILLOMA: Status: ACTIVE | Noted: 2022-05-23

## 2022-05-23 PROBLEM — N52.9 ERECTILE DYSFUNCTION: Status: ACTIVE | Noted: 2022-05-23

## 2022-05-23 PROBLEM — J30.9 ALLERGIC RHINITIS: Status: ACTIVE | Noted: 2022-05-23

## 2022-05-23 PROBLEM — I20.89 STABLE ANGINA PECTORIS: Status: ACTIVE | Noted: 2022-05-23

## 2022-05-23 PROBLEM — E78.5 DYSLIPIDEMIA: Status: ACTIVE | Noted: 2022-05-23

## 2022-05-23 PROBLEM — M51.36 DEGENERATION OF INTERVERTEBRAL DISC OF LUMBAR REGION: Status: ACTIVE | Noted: 2022-05-23

## 2022-05-23 PROBLEM — L57.0 ACTINIC KERATOSIS: Status: ACTIVE | Noted: 2022-05-23

## 2022-05-23 PROBLEM — M48.00 STENOSIS OF INTERVERTEBRAL FORAMINA: Status: ACTIVE | Noted: 2022-05-23

## 2022-05-23 PROBLEM — Z85.828 HISTORY OF SQUAMOUS CELL CARCINOMA OF SKIN: Status: ACTIVE | Noted: 2022-05-23

## 2022-05-23 PROBLEM — G47.30 SLEEP APNEA: Status: ACTIVE | Noted: 2022-05-23

## 2022-05-23 PROBLEM — E29.1 TESTICULAR HYPOFUNCTION: Status: ACTIVE | Noted: 2022-05-23

## 2022-05-23 PROBLEM — M51.369 DEGENERATION OF INTERVERTEBRAL DISC OF LUMBAR REGION: Status: ACTIVE | Noted: 2022-05-23

## 2022-05-23 PROBLEM — H26.9 CATARACT OF BOTH EYES: Status: ACTIVE | Noted: 2022-05-23

## 2022-05-23 PROBLEM — I87.2 VENOUS INSUFFICIENCY OF LOWER EXTREMITY: Status: ACTIVE | Noted: 2022-05-23

## 2022-05-23 PROBLEM — N20.0 CALCULUS OF KIDNEY: Status: ACTIVE | Noted: 2022-05-23

## 2022-05-23 PROBLEM — Z85.828 HISTORY OF BASAL CELL CARCINOMA: Status: ACTIVE | Noted: 2022-05-23

## 2022-05-23 PROBLEM — I10 HYPERTENSION: Status: ACTIVE | Noted: 2022-05-23

## 2022-05-23 PROBLEM — Z97.3 WEARS EYEGLASSES: Status: ACTIVE | Noted: 2022-05-23

## 2022-05-23 PROBLEM — M19.90 OSTEOARTHRITIS: Status: ACTIVE | Noted: 2022-05-23

## 2022-05-23 PROBLEM — Z87.891 HISTORY OF SMOKING: Status: ACTIVE | Noted: 2022-05-23

## 2022-05-23 PROBLEM — C44.711 BASAL CELL CARCINOMA (BCC) OF LOWER EXTREMITY: Status: ACTIVE | Noted: 2022-05-23

## 2022-05-23 PROBLEM — R79.89 DECREASED TESTOSTERONE LEVEL: Status: ACTIVE | Noted: 2022-05-23

## 2022-05-23 PROBLEM — M50.20 HERNIATED NUCLEUS PULPOSUS, CERVICAL: Status: ACTIVE | Noted: 2022-05-23

## 2022-05-23 PROCEDURE — 81001 URINALYSIS AUTO W/SCOPE: CPT

## 2022-05-23 PROCEDURE — 74176 CT ABD & PELVIS W/O CONTRAST: CPT | Mod: TC

## 2022-05-23 PROCEDURE — 99215 OFFICE O/P EST HI 40 MIN: CPT | Mod: PBBFAC,25

## 2022-05-23 PROCEDURE — 87088 URINE BACTERIA CULTURE: CPT

## 2022-05-23 RX ORDER — TAMSULOSIN HYDROCHLORIDE 0.4 MG/1
CAPSULE ORAL
COMMUNITY
Start: 2022-01-27 | End: 2022-07-15 | Stop reason: SDUPTHER

## 2022-05-23 RX ORDER — RANOLAZINE 500 MG/1
500 TABLET, EXTENDED RELEASE ORAL 2 TIMES DAILY
COMMUNITY
Start: 2022-04-27

## 2022-05-23 RX ORDER — FUROSEMIDE 20 MG/1
20 TABLET ORAL DAILY
COMMUNITY
Start: 2022-04-22

## 2022-05-23 RX ORDER — ISOSORBIDE MONONITRATE 60 MG/1
60 TABLET, EXTENDED RELEASE ORAL 2 TIMES DAILY
COMMUNITY
Start: 2022-04-27

## 2022-05-23 RX ORDER — CLONAZEPAM 1 MG/1
1 TABLET ORAL 3 TIMES DAILY PRN
COMMUNITY
Start: 2022-04-29 | End: 2022-05-24 | Stop reason: SDUPTHER

## 2022-05-23 RX ORDER — CLOPIDOGREL BISULFATE 75 MG/1
75 TABLET ORAL DAILY
COMMUNITY
Start: 2022-01-27 | End: 2023-01-19 | Stop reason: SDUPTHER

## 2022-05-23 RX ORDER — ASPIRIN 300 MG
SUPPOSITORY, RECTAL RECTAL
COMMUNITY
Start: 2022-04-22 | End: 2022-05-24

## 2022-05-23 RX ORDER — METOPROLOL TARTRATE 25 MG/1
1 TABLET, FILM COATED ORAL 2 TIMES DAILY
COMMUNITY
Start: 2022-01-27

## 2022-05-23 RX ORDER — ATORVASTATIN CALCIUM 40 MG/1
40 TABLET, FILM COATED ORAL DAILY
COMMUNITY
Start: 2022-01-27 | End: 2023-11-13 | Stop reason: SDUPTHER

## 2022-05-23 RX ORDER — LORATADINE 10 MG/1
10 TABLET ORAL DAILY
COMMUNITY
Start: 2022-05-23

## 2022-05-23 RX ORDER — TESTOSTERONE CYPIONATE 1000 MG/10ML
INJECTION, SOLUTION INTRAMUSCULAR
COMMUNITY
Start: 2022-04-27 | End: 2022-08-03 | Stop reason: DRUGHIGH

## 2022-05-23 RX ORDER — SILDENAFIL CITRATE 100 MG/1
100 TABLET, FILM COATED ORAL
COMMUNITY
Start: 2022-04-18 | End: 2022-07-15 | Stop reason: SDUPTHER

## 2022-05-23 RX ORDER — NITROGLYCERIN 0.4 MG/1
TABLET SUBLINGUAL
COMMUNITY
Start: 2022-04-18

## 2022-05-23 RX ORDER — RIVAROXABAN 2.5 MG/1
1 TABLET, FILM COATED ORAL 2 TIMES DAILY
COMMUNITY
Start: 2022-04-27

## 2022-05-23 RX ORDER — FLUTICASONE PROPIONATE 50 MCG
1 SPRAY, SUSPENSION (ML) NASAL DAILY
COMMUNITY
Start: 2022-01-27 | End: 2023-10-26 | Stop reason: SDUPTHER

## 2022-05-23 RX ORDER — LISINOPRIL 5 MG/1
5 TABLET ORAL DAILY
COMMUNITY
Start: 2022-01-27 | End: 2022-11-17 | Stop reason: CLARIF

## 2022-05-23 RX ORDER — FLUOROURACIL 50 MG/G
CREAM TOPICAL
COMMUNITY
Start: 2022-01-27 | End: 2023-06-02 | Stop reason: SDUPTHER

## 2022-05-24 PROBLEM — R10.9 RIGHT FLANK PAIN: Status: ACTIVE | Noted: 2022-05-24

## 2022-05-24 PROBLEM — G89.29 CHRONIC BACK PAIN: Status: ACTIVE | Noted: 2022-05-23

## 2022-05-24 PROBLEM — M43.17 SPONDYLOLISTHESIS AT L5-S1 LEVEL: Status: ACTIVE | Noted: 2022-05-24

## 2022-05-24 PROBLEM — I21.4 ACUTE NON-ST ELEVATION MYOCARDIAL INFARCTION (NSTEMI): Status: RESOLVED | Noted: 2022-05-23 | Resolved: 2022-05-24

## 2022-05-24 PROBLEM — N20.0 CALCULUS OF KIDNEY: Status: RESOLVED | Noted: 2022-05-23 | Resolved: 2022-05-24

## 2022-05-24 PROBLEM — M48.07 NEUROFORAMINAL STENOSIS OF LUMBOSACRAL SPINE: Status: ACTIVE | Noted: 2022-05-23

## 2022-05-24 PROBLEM — Z85.828 HISTORY OF BASAL CELL CARCINOMA: Status: RESOLVED | Noted: 2022-05-23 | Resolved: 2022-05-24

## 2022-05-24 RX ORDER — CLONAZEPAM 1 MG/1
1 TABLET ORAL 3 TIMES DAILY PRN
Qty: 90 TABLET | Refills: 0
Start: 2022-05-28 | End: 2022-05-25

## 2022-05-24 RX ORDER — CLONAZEPAM 1 MG/1
1 TABLET ORAL 3 TIMES DAILY PRN
Qty: 90 TABLET | Refills: 0
Start: 2022-06-27 | End: 2022-05-25

## 2022-05-24 RX ORDER — CLONAZEPAM 1 MG/1
1 TABLET ORAL 3 TIMES DAILY PRN
Qty: 90 TABLET | Refills: 0
Start: 2022-07-26 | End: 2022-05-25

## 2022-05-24 NOTE — PROGRESS NOTES
"  [facility]  Office Visit Note    Subjective:       Patient ID: Nikolas Gutierrez, : 1950.    Chief Complaint: Follow-up (Right kidney/)      71 y.o. male presents for f/u.    Acute concerns  R flank pain: Constant R flank pain for >1 week,     R flank pain: Focal R flank pain which is aching in character, 6/10 intensity, does not radiate.  Onset >1 week ago.  Sx unchanged since onset.  Aggravated by movement, alleviated by rest & BC powder.  Denies dysuria, hematuria, oliguria, nausea, vomiting, or fever.      Chronic issues addressed  Multivessel CAD: Class III angina, CAD s/p bypass in 2 separate locations, 3 patent bypass grafts, s/p multiple interventions with stents to RCA system.  Underwent LHC in 2022 revealing EF of 40%.  Balloon angioplasty performed on PDA/posterolateral artery w/ improvement from % occlusion to <10% residual stenosis in each vessel.  Complaint with Lipitor 40 mg qd, Imdur 60 mg BID (increased from daily post-cath in 2022), Ranexa 500 mg BID, lisinopril 5 mg qd, Lopressor 25 mg qd, Plavix 75 mg qd, and Xarelto 2.5 mg qd.  Denies chest pain or dyspnea since cath; has not needed SL nitro.    Anxiety: Mood stable, doing well on clonazepam 1 mg TID.    Neuroforaminal stenosis and spondylolisthesis of LS spine: Taking BC powder 3-4 times daily for >1 year.  Previously received injections per neurosurgery but has since discontinued these due to hassle/time constraints.  Considering re-eval by NS.    ED: Good results with current use of Viagra.  Denies dizziness, lightheadedness, or syncope.      ROS  As per HPI      Objective:      PHYSICAL EXAM  Vitals:    22 1354   BP: 108/71   BP Location: Right arm   Patient Position: Sitting   BP Method: Medium (Automatic)   Pulse: 76   Resp: 18   Temp: 97.3 °F (36.3 °C)   SpO2: 98%   Weight: 93.3 kg (205 lb 11 oz)   Height: 5' 7.72" (1.72 m)     GEN: Patient appears well, alert and oriented x 4, pleasant, cooperative  HEAD: " Normocephalic  EYES: EOMI  ENT: Oral mucosa moist.  NECK: Supple, w/o LAD or thyromegaly  CHEST: Respirations nonlabored, CTAB  CV: RRR, no r/g/m  ABD: Nondistended, soft, normoactive bowel sounds, without organomegaly.  No rash or lesion overlying flanks.  : Moderate R CVA tenderness to light palpation  EXT: Peripheral pulses intact.  Cap refill <2 s.  INTEG: Warm, dry        Current Outpatient Medications:     atorvastatin (LIPITOR) 40 MG tablet, Take 40 mg by mouth once daily., Disp: , Rfl:     clonazePAM (KLONOPIN) 1 MG tablet, Take 1 mg by mouth 3 (three) times daily as needed., Disp: , Rfl:     clopidogreL (PLAVIX) 75 mg tablet, Take 75 mg by mouth Daily., Disp: , Rfl:     fluorouraciL (EFUDEX) 5 % cream, Apply topically., Disp: , Rfl:     fluticasone propionate (FLONASE) 50 mcg/actuation nasal spray, 1 spray by Each Nostril route once daily., Disp: , Rfl:     furosemide (LASIX) 20 MG tablet, Take 20 mg by mouth once daily at 6am., Disp: , Rfl:     isosorbide mononitrate (IMDUR) 60 MG 24 hr tablet, Take 60 mg by mouth 2 (two) times daily., Disp: , Rfl:     lisinopriL (PRINIVIL,ZESTRIL) 5 MG tablet, Take 5 mg by mouth Daily., Disp: , Rfl:     loratadine (CLARITIN) 10 mg tablet, Take 10 mg by mouth once daily., Disp: , Rfl:     metoprolol tartrate (LOPRESSOR) 25 MG tablet, Take 1 tablet by mouth 2 (two) times a day., Disp: , Rfl:     ranolazine (RANEXA) 500 MG Tb12, Take 500 mg by mouth 2 (two) times daily., Disp: , Rfl:     tamsulosin (FLOMAX) 0.4 mg Cap,  See Instructions, TAKE 1 CAPSULE BY MOUTH EVERY DAY, # 90 cap(s), 1 Refill(s), Pharmacy: [facility] STORE [id number], 172, cm, Height/Length Dosing, 02/21/22 15:56:00 CST, 93.6, kg, Weight Dosing, 03/28/22 15:00:00 CDT, Disp: , Rfl:     testosterone cypionate (DEPOTESTOTERONE CYPIONATE) 100 mg/mL injection, Inject into the muscle., Disp: , Rfl:     VIAGRA 100 mg tablet, Take 100 mg by mouth as needed., Disp: , Rfl:     XARELTO 2.5 mg Tab, Take 1 tablet  by mouth 2 (two) times daily., Disp: , Rfl:     nitroGLYCERIN (NITROSTAT) 0.4 MG SL tablet, SMARTSI Tablet(s) Sublingual PRN, Disp: , Rfl:     CBC:   Lab Results   Component Value Date    WBC 7.0 2021    HGB 16.5 2021    HCT 50.2 2021    MCV 91.3 2021    RDW 13.9 2021     BMP:   Lab Results   Component Value Date    CHLORIDE 108 (H) 2021    CO2 22 (L) 2021    BUN 13.3 2021    CREATININE 0.94 2021    GLUCOSE 110 2021    CALCIUM 9.1 2021     LFTs:   Lab Results   Component Value Date    ALBUMIN 3.7 2021    BILITOT 0.8 2021    AST 21 2021    ALKPHOS 76 2021    ALT 18 2021     FLP:   Lab Results   Component Value Date    CHOL 114 2021    HDL 26 (L) 2021    LDL 68.00 2021    TRIG 101 2021     DM:   Lab Results   Component Value Date    HGBA1C 5.1 2021    HGBA1C 5.2 2018    CREATININE 0.94 2021     Thyroid:   Lab Results   Component Value Date    TSH 2.711 2020    [id number] 1.04 2018         Assessment:       1. Right flank pain    2. Arteriosclerosis of coronary artery    3. Generalized anxiety disorder    4. Abdominal pain, unspecified abdominal location    5. Spondylolisthesis at L5-S1 level    6. Neuroforaminal stenosis of lumbosacral spine           Plan:       Given Hx of nephrolithiasis, lithotripsy in past, similar Sx w/ R CVA tenderness on today's exam, concern is for recurrent nephrolithiasis.  UA, UCx ordered.  CT A/P, ordered stat, w/o contrast given contrast shortage.    Discontinue BC powder due to significant bleeding risk with concurrent Xarelto use.  Trial of naproxen 500 mg BID for symptomatic relief of chronic neck and back pain.  Pt considering re-evaluation by neurosurgery.  In all likelihood, may not be a candidate for surgery given significant cardiac Hx.    Anxiety stable, refilled clonazepam x3 months, postdated.  Last filled  4/29/2022.    Refilled Viagra for ED as continues to have no adverse effects.        Follow up in about 1 month (around 6/23/2022), or if symptoms worsen or fail to improve, for HCM with labs beforehand.     Reviewed imaging and UA findings with Pt.  No nephroureterolithiasis visualized in R urinary system.  Cystic appearance in the lower pole the right kidney in the interpolar region of the left kidney previously described by US exam on 09/06/2019 as simple cysts.  Stable nonobstructing 4 mm calculus at lower pole of L kidney.  Referral placed to urology for evaluation of chronic renal cysts.    Incidentally imaged chronic left basilar empyema with left basilar compressive atelectasis and lingular subsegmental atelectasis w/o interval change.  Simple appearing cyst in the right hepatic dome without significant change visualized as well.

## 2022-05-24 NOTE — ASSESSMENT & PLAN NOTE
Class III angina, CAD s/p bypass in 2 separate locations, 3 patent bypass grafts, s/p multiple interventions with stents to RCA system.    [facility] 4/2022: EF 40%.  Otherwise, similar findings & procedural technique as in 11/2019 cath.  Coronary flow YOEL 3 post-procedure.  Rec to cont Plavix, ASA, ACEi, beta blockers, & high-intensity statin.  Restarted Xarelto 2.5 mg BID.

## 2022-05-25 LAB
APPEARANCE UR: CLEAR
BACTERIA #/AREA URNS AUTO: ABNORMAL /HPF
BILIRUB UR QL STRIP.AUTO: NEGATIVE MG/DL
COLOR UR AUTO: YELLOW
GLUCOSE UR QL STRIP.AUTO: NORMAL MG/DL
HYALINE CASTS #/AREA URNS LPF: ABNORMAL /LPF
KETONES UR QL STRIP.AUTO: NEGATIVE MG/DL
LEUKOCYTE ESTERASE UR QL STRIP.AUTO: NEGATIVE UNIT/L
MUCOUS THREADS URNS QL MICRO: ABNORMAL /LPF
NITRITE UR QL STRIP.AUTO: NEGATIVE
PH UR STRIP.AUTO: 6.5 [PH]
PROT UR QL STRIP.AUTO: ABNORMAL MG/DL
RBC #/AREA URNS AUTO: ABNORMAL /HPF
RBC UR QL AUTO: NEGATIVE UNIT/L
SP GR UR STRIP.AUTO: 1.03
SQUAMOUS #/AREA URNS LPF: ABNORMAL /HPF
UROBILINOGEN UR STRIP-ACNC: NORMAL MG/DL
WBC #/AREA URNS AUTO: ABNORMAL /HPF

## 2022-05-25 RX ORDER — CLONAZEPAM 1 MG/1
1 TABLET ORAL 3 TIMES DAILY PRN
Qty: 90 TABLET | Refills: 0 | Status: SHIPPED | OUTPATIENT
Start: 2022-07-26 | End: 2022-09-22 | Stop reason: SDUPTHER

## 2022-05-25 RX ORDER — CLONAZEPAM 1 MG/1
1 TABLET ORAL 3 TIMES DAILY PRN
Qty: 90 TABLET | Refills: 0 | Status: SHIPPED | OUTPATIENT
Start: 2022-05-28 | End: 2022-06-27

## 2022-05-25 RX ORDER — NAPROXEN 500 MG/1
500 TABLET ORAL 2 TIMES DAILY WITH MEALS
Qty: 60 TABLET | Refills: 2 | Status: SHIPPED | OUTPATIENT
Start: 2022-05-25 | End: 2022-07-15 | Stop reason: ALTCHOICE

## 2022-05-25 RX ORDER — CLONAZEPAM 1 MG/1
1 TABLET ORAL 3 TIMES DAILY PRN
Qty: 90 TABLET | Refills: 0 | Status: SHIPPED | OUTPATIENT
Start: 2022-06-27 | End: 2022-07-27

## 2022-05-27 LAB — BACTERIA UR CULT: NO GROWTH

## 2022-07-15 ENCOUNTER — OFFICE VISIT (OUTPATIENT)
Dept: UROLOGY | Facility: CLINIC | Age: 72
End: 2022-07-15
Payer: COMMERCIAL

## 2022-07-15 VITALS
OXYGEN SATURATION: 95 % | WEIGHT: 195.63 LBS | HEART RATE: 60 BPM | SYSTOLIC BLOOD PRESSURE: 106 MMHG | RESPIRATION RATE: 20 BRPM | DIASTOLIC BLOOD PRESSURE: 72 MMHG | TEMPERATURE: 98 F | HEIGHT: 68 IN | BODY MASS INDEX: 29.65 KG/M2

## 2022-07-15 DIAGNOSIS — N28.1 BILATERAL RENAL CYSTS: ICD-10-CM

## 2022-07-15 DIAGNOSIS — N20.0 KIDNEY STONE: ICD-10-CM

## 2022-07-15 DIAGNOSIS — E29.1 HYPOGONADISM IN MALE: Primary | ICD-10-CM

## 2022-07-15 DIAGNOSIS — N40.0 BENIGN PROSTATIC HYPERPLASIA, UNSPECIFIED WHETHER LOWER URINARY TRACT SYMPTOMS PRESENT: ICD-10-CM

## 2022-07-15 PROCEDURE — 99215 OFFICE O/P EST HI 40 MIN: CPT | Mod: PBBFAC | Performed by: NURSE PRACTITIONER

## 2022-07-15 PROCEDURE — 99214 OFFICE O/P EST MOD 30 MIN: CPT | Mod: S$PBB,,, | Performed by: NURSE PRACTITIONER

## 2022-07-15 PROCEDURE — 99214 PR OFFICE/OUTPT VISIT, EST, LEVL IV, 30-39 MIN: ICD-10-PCS | Mod: S$PBB,,, | Performed by: NURSE PRACTITIONER

## 2022-07-15 RX ORDER — TAMSULOSIN HYDROCHLORIDE 0.4 MG/1
0.4 CAPSULE ORAL DAILY
Qty: 30 CAPSULE | Refills: 11 | Status: SHIPPED | OUTPATIENT
Start: 2022-07-15 | End: 2023-10-18 | Stop reason: SDUPTHER

## 2022-07-15 RX ORDER — SILDENAFIL 50 MG/1
50 TABLET, FILM COATED ORAL DAILY PRN
Qty: 30 TABLET | Refills: 11 | Status: SHIPPED | OUTPATIENT
Start: 2022-07-15 | End: 2023-06-02 | Stop reason: SDUPTHER

## 2022-07-15 RX ORDER — DICLOFENAC SODIUM 75 MG/1
75 TABLET, DELAYED RELEASE ORAL 2 TIMES DAILY
Qty: 60 TABLET | Refills: 2 | Status: SHIPPED | OUTPATIENT
Start: 2022-07-15

## 2022-07-15 NOTE — PROGRESS NOTES
Chief Complaint:   Chief Complaint   Patient presents with    Bilateral renal cyst       HPI: Patient is a 71-year-old male referred to Urology for renal cyst.  Patient has been having history of renal cysts in the past which is stable per CT recently performed.  On current CT also shows patient is a 4 mm nonobstructing left renal stone.  Patient complained of right flank and low back pain CT revealed moderate to severe thoracic lumbar spondylosis in that area.  Patient also treated in the past for decreased urine flow placed on Flomax which is working very well.  Patient also treated with Viagra for erectile dysfunction working very well.  Would like to continue.  Patient currently on testosterone but takes irregular 100 mg injections patient not consistent with injections.  Today Patient urine stream moderately strong, patient denies hesitancy, straining, interruption of stream, dribbling, frequency, nocturia. ZAHIDA:  As listed below. Family history of urologic pathology & personal history of stones. History of STD's, neurologic injury, prostatitis, med use anti-cholinergics, antidepressants, or decongestants.          Allergies:  Review of patient's allergies indicates:   Allergen Reactions    Tomato Blisters       Medications:  Current Outpatient Medications   Medication Sig Dispense Refill    atorvastatin (LIPITOR) 40 MG tablet Take 40 mg by mouth once daily.      [START ON 7/26/2022] clonazePAM (KLONOPIN) 1 MG tablet Take 1 tablet (1 mg total) by mouth 3 (three) times daily as needed for Anxiety. 90 tablet 0    clonazePAM (KLONOPIN) 1 MG tablet Take 1 tablet (1 mg total) by mouth 3 (three) times daily as needed for Anxiety. 90 tablet 0    clopidogreL (PLAVIX) 75 mg tablet Take 75 mg by mouth Daily.      fluorouraciL (EFUDEX) 5 % cream Apply topically.      fluticasone propionate (FLONASE) 50 mcg/actuation nasal spray 1 spray by Each Nostril route once daily.      furosemide (LASIX) 20 MG tablet Take 20  mg by mouth once daily at 6am.      isosorbide mononitrate (IMDUR) 60 MG 24 hr tablet Take 60 mg by mouth 2 (two) times daily.      lisinopriL (PRINIVIL,ZESTRIL) 5 MG tablet Take 5 mg by mouth Daily.      loratadine (CLARITIN) 10 mg tablet Take 10 mg by mouth once daily.      metoprolol tartrate (LOPRESSOR) 25 MG tablet Take 1 tablet by mouth 2 (two) times a day.      naproxen (EC NAPROSYN) 500 MG EC tablet Take 1 tablet (500 mg total) by mouth 2 (two) times daily with meals. 60 tablet 2    nitroGLYCERIN (NITROSTAT) 0.4 MG SL tablet SMARTSI Tablet(s) Sublingual PRN      ranolazine (RANEXA) 500 MG Tb12 Take 500 mg by mouth 2 (two) times daily.      tamsulosin (FLOMAX) 0.4 mg Cap   See Instructions, TAKE 1 CAPSULE BY MOUTH EVERY DAY, # 90 cap(s), 1 Refill(s), Pharmacy: [id number], 172, cm, Height/Length Dosing, 22 15:56:00 CST, 93.6, kg, Weight Dosing, 22 15:00:00 CDT      testosterone cypionate (DEPOTESTOTERONE CYPIONATE) 100 mg/mL injection Inject into the muscle.      VIAGRA 100 mg tablet Take 100 mg by mouth as needed.      XARELTO 2.5 mg Tab Take 1 tablet by mouth 2 (two) times daily.       No current facility-administered medications for this visit.       Review of Systems:  General: No fever, chills, fatigability, or weight loss.  Skin: No rashes, itching, or changes in color or texture of skin.  Chest: Denies BARBOZA, cyanosis, wheezing, cough, and sputum production.  Abdomen: Appetite fine. No weight loss. Denies diarrhea, abdominal pain, hematemesis, or blood in stool.  Musculoskeletal: No joint stiffness or swelling. Denies back pain.  : As above.  All other review of systems negative.    PMH:  Past Medical History:   Diagnosis Date    Actinic keratoses     Acute non-ST elevation myocardial infarction (NSTEMI) 2019    Basal cell carcinoma (BCC) of skin of lower extremity including hip 2015    R mid-distal shin, failed therapy w/ Aldera cream, s/p electrodessication &  curettage 12/2015    CAD, multiple vessel     Calculus of kidney 05/23/2022    s/p lithotripsy years ago    Empyema of left pleural space     History of excision of pilonidal cyst     Other seasonal allergic rhinitis     Severe acute respiratory syndrome coronavirus 2 (SARS-CoV-2) vaccination not indicated 05/23/2022    Problem added via discern rule sz_[id number]_pos_neg    Simple hepatic cyst     R dome       PSH:  Past Surgical History:   Procedure Laterality Date    LITHOTRIPSY      pilonidial cyst excision      TOTAL HIP ARTHROPLASTY Right 05/13/2014    Secondary to avascular necrosis of femoral head       FamHx:  History reviewed. No pertinent family history.    SocHx:  Social History     Socioeconomic History    Marital status:    Tobacco Use    Smoking status: Never Smoker    Smokeless tobacco: Never Used       Physical Exam:  Vitals:    07/15/22 0838   BP: 106/72   Pulse: 60   Resp: 20   Temp: 97.7 °F (36.5 °C)     General: A&Ox3, no apparent distress, no deformities  Neck: No masses, normal thyroid  Lungs: CTA flip, no use of accessory muscles  Heart: RRR, no arrhythmias  Abdomen: Soft, NT, ND, no masses, no hernias, no hepatosplenomegaly  Lymphatic: Neck and groin nodes negative  Skin: The skin is warm and dry. No jaundice.  Ext: No c/c/e.    GUMale: Test desc flip, no abnormalities of epididymus. Penis uncircumcised, with normal penile and scrotal skin. Meatus normal. Normal rectal tone, no hemorrhoids. Prostate 1-1/2 finger breath wide, smooth, soft, nontender, no nodules or masses appreciated. SV not palpable. Perineum and anus normal.    Labs:  No current labs.    Imaging:  CT as described above.    Impression:  Decreased urine stream, erectile dysfunction, hypogonadism, kidney stones    Plan: PSA, lipids, CBC, testosterone , CMP, continue Flomax, & Viagra, F/U 1 months.

## 2022-07-18 ENCOUNTER — TELEPHONE (OUTPATIENT)
Dept: UROLOGY | Facility: CLINIC | Age: 72
End: 2022-07-18
Payer: COMMERCIAL

## 2022-07-18 NOTE — TELEPHONE ENCOUNTER
Notified patient of results of PSA and testosterone given the patient per telephone pending cbc and CMP will notify patient when labs are available.

## 2022-08-03 DIAGNOSIS — E29.1 HYPOGONADISM IN MALE: Primary | ICD-10-CM

## 2022-08-11 DIAGNOSIS — E29.1 HYPOGONADISM IN MALE: Primary | ICD-10-CM

## 2022-08-11 RX ORDER — TESTOSTERONE CYPIONATE 200 MG/ML
200 INJECTION, SOLUTION INTRAMUSCULAR
Qty: 10 ML | Refills: 1 | Status: SHIPPED | OUTPATIENT
Start: 2022-08-11 | End: 2023-06-12

## 2022-09-22 ENCOUNTER — OFFICE VISIT (OUTPATIENT)
Dept: INTERNAL MEDICINE | Facility: CLINIC | Age: 72
End: 2022-09-22
Payer: COMMERCIAL

## 2022-09-22 VITALS
DIASTOLIC BLOOD PRESSURE: 59 MMHG | HEART RATE: 65 BPM | BODY MASS INDEX: 32.59 KG/M2 | SYSTOLIC BLOOD PRESSURE: 104 MMHG | HEIGHT: 67 IN | TEMPERATURE: 97 F | WEIGHT: 207.63 LBS

## 2022-09-22 DIAGNOSIS — I25.10 CORONARY ARTERY DISEASE, UNSPECIFIED VESSEL OR LESION TYPE, UNSPECIFIED WHETHER ANGINA PRESENT, UNSPECIFIED WHETHER NATIVE OR TRANSPLANTED HEART: ICD-10-CM

## 2022-09-22 DIAGNOSIS — M54.9 DORSALGIA, UNSPECIFIED: Primary | ICD-10-CM

## 2022-09-22 DIAGNOSIS — M54.12 CERVICAL RADICULOPATHY: ICD-10-CM

## 2022-09-22 DIAGNOSIS — R10.9 ABDOMINAL PAIN, UNSPECIFIED ABDOMINAL LOCATION: ICD-10-CM

## 2022-09-22 DIAGNOSIS — M25.551 HIP PAIN, ACUTE, RIGHT: ICD-10-CM

## 2022-09-22 DIAGNOSIS — F41.9 ANXIETY: ICD-10-CM

## 2022-09-22 PROCEDURE — 99215 OFFICE O/P EST HI 40 MIN: CPT | Mod: PBBFAC | Performed by: INTERNAL MEDICINE

## 2022-09-22 RX ORDER — CLONAZEPAM 1 MG/1
1 TABLET ORAL 3 TIMES DAILY PRN
Qty: 90 TABLET | Refills: 0 | Status: SHIPPED | OUTPATIENT
Start: 2022-09-22 | End: 2022-10-20 | Stop reason: SDUPTHER

## 2022-09-22 NOTE — PROGRESS NOTES
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  He states that overall he has been doing well.  He has significant cardiac history.  Underwent cardiac cath  in April of this year and states that his cardiologist is Dr. Gates.  Today he is complaining of pain in his right flank.  The discomfort is definitely worse when he moves.  He thinks he has spasms in the back however is concerned because this soreness is deep inside. He has a CT Abd WITHOUT CONTRAST AND IS REQUESTING another one with contrast as he is very worried.  He continues complain of pain in his lower back which is different from the flank pain was described above.  He states that the discomfort which showed gallstone is right leg which causes some numbness.  He is also complaining of ongoing pain in the neck.  Both of these he is requesting repeat MRIs as the previous ones were done a couple of years ago  He has had a hip replacement - unsure who his previous orthopedic surgeon voiced today he states that he would like to be on to someone else.  The discomfort in his hip is worse with movement    ROS:  CONSTITUTIONAL: Denies weight loss, fever and chills.  HEENT: Denies changes in vision and hearing.  ?RESPIRATORY: Denies SOB and cough.?  CV: Denies palpitations and CP. ?  GI: Denies abdominal pain, nausea, vomiting and diarrhea.?  : Denies dysuria and urinary frequency.?  MSK: Denies myalgia and joint pain.?  SKIN: Denies rash and pruritus.  ?NEUROLOGICAL: Denies headache and syncope.?  PSYCHIATRIC: Denies recent changes in mood. Denies anxiety and depression.     OBJECTIVE:     Physical Examination:    Vital signs:     Vitals:    09/22/22 1426   BP: (!) 104/59   Pulse: 65   Temp: 97 °F (36.1 °C)        General: Well nourished w/o distress  HEENT: NC/AT; PERRLA; nasal and oral mucosa moist and clear; no sinus  tenderness; no thyromegaly  Neck: Full ROM; no lymphadenopathy  Pulm: CTA bilaterally, normal work of breathing  CV: S1, S2 w/o murmurs or gallops; no edema noted  GI: Soft with normal bowel sounds in all quadrants, no masses on palpation  MSK: Full ROM of all extremities and spine w/o limitation or discomfort  Derm: No rashes, abnormal bruising, or skin lesions  Neuro: AAOx4; CN II-XII intact; motor/sensory function intact  Psych: Cooperative; appropriate mood and affect         ASSESSMENT & PLAN:     CAD        Cardiac Cath April 2022- medical mm advised        On Xarelto, ASA, Plavix, Metoprolol, Ranexa, Nitro (does not need it since   cath), Imdur,     2. Anxiety ds       Klonopin prn    3. Lumbar deg disc ds  Rpt MRI cervical and Lumbar ordered    4. Allergic Rhinitis      Stable on Claritin, Flonase    5. HTN      On Lisinopril, Lasix,     6. Hyperlipidemia      Atorvastatin    7. Squamous cell ca     8. Testicular Dysfn       Testosterone inj bimonthly    9. Pain in r flank  Labs- CMP ordered prior to getting CT Abd with contrast    10. Hip pain  X Ray or referred to Dr Baker           Follow up in about 3 months (around 12/22/2022).     Jennifer Quiros MD

## 2022-09-30 ENCOUNTER — LAB VISIT (OUTPATIENT)
Dept: LAB | Facility: HOSPITAL | Age: 72
End: 2022-09-30
Attending: INTERNAL MEDICINE
Payer: COMMERCIAL

## 2022-09-30 DIAGNOSIS — I25.10 CORONARY ARTERY DISEASE, UNSPECIFIED VESSEL OR LESION TYPE, UNSPECIFIED WHETHER ANGINA PRESENT, UNSPECIFIED WHETHER NATIVE OR TRANSPLANTED HEART: ICD-10-CM

## 2022-09-30 DIAGNOSIS — E29.1 HYPOGONADISM IN MALE: ICD-10-CM

## 2022-09-30 LAB
ALBUMIN SERPL-MCNC: 3.9 GM/DL (ref 3.4–4.8)
ALBUMIN/GLOB SERPL: 1.1 RATIO (ref 1.1–2)
ALP SERPL-CCNC: 45 UNIT/L (ref 40–150)
ALT SERPL-CCNC: 17 UNIT/L (ref 0–55)
APPEARANCE UR: CLEAR
AST SERPL-CCNC: 20 UNIT/L (ref 5–34)
BACTERIA #/AREA URNS AUTO: ABNORMAL /HPF
BASOPHILS # BLD AUTO: 0.05 X10(3)/MCL (ref 0–0.2)
BASOPHILS NFR BLD AUTO: 0.8 %
BILIRUB UR QL STRIP.AUTO: NEGATIVE MG/DL
BILIRUBIN DIRECT+TOT PNL SERPL-MCNC: 0.7 MG/DL
BUN SERPL-MCNC: 19.5 MG/DL (ref 8.4–25.7)
CALCIUM SERPL-MCNC: 9.3 MG/DL (ref 8.8–10)
CHLORIDE SERPL-SCNC: 103 MMOL/L (ref 98–107)
CHOLEST SERPL-MCNC: 114 MG/DL
CHOLEST/HDLC SERPL: 4 {RATIO} (ref 0–5)
CO2 SERPL-SCNC: 26 MMOL/L (ref 23–31)
COLOR UR AUTO: ABNORMAL
CREAT SERPL-MCNC: 1.17 MG/DL (ref 0.73–1.18)
EOSINOPHIL # BLD AUTO: 0.11 X10(3)/MCL (ref 0–0.9)
EOSINOPHIL NFR BLD AUTO: 1.7 %
ERYTHROCYTE [DISTWIDTH] IN BLOOD BY AUTOMATED COUNT: 14.6 % (ref 11.5–17)
GFR SERPLBLD CREATININE-BSD FMLA CKD-EPI: >60 MLS/MIN/1.73/M2
GLOBULIN SER-MCNC: 3.4 GM/DL (ref 2.4–3.5)
GLUCOSE SERPL-MCNC: 79 MG/DL (ref 82–115)
GLUCOSE UR QL STRIP.AUTO: NORMAL MG/DL
HCT VFR BLD AUTO: 48.9 % (ref 42–52)
HDLC SERPL-MCNC: 27 MG/DL (ref 35–60)
HGB BLD-MCNC: 16.3 GM/DL (ref 14–18)
HYALINE CASTS #/AREA URNS LPF: ABNORMAL /LPF
IMM GRANULOCYTES # BLD AUTO: 0.02 X10(3)/MCL (ref 0–0.04)
IMM GRANULOCYTES NFR BLD AUTO: 0.3 %
KETONES UR QL STRIP.AUTO: NEGATIVE MG/DL
LDLC SERPL CALC-MCNC: 70 MG/DL (ref 50–140)
LEUKOCYTE ESTERASE UR QL STRIP.AUTO: NEGATIVE UNIT/L
LYMPHOCYTES # BLD AUTO: 1.76 X10(3)/MCL (ref 0.6–4.6)
LYMPHOCYTES NFR BLD AUTO: 26.9 %
MCH RBC QN AUTO: 30.4 PG (ref 27–31)
MCHC RBC AUTO-ENTMCNC: 33.3 MG/DL (ref 33–36)
MCV RBC AUTO: 91.2 FL (ref 80–94)
MONOCYTES # BLD AUTO: 0.42 X10(3)/MCL (ref 0.1–1.3)
MONOCYTES NFR BLD AUTO: 6.4 %
MUCOUS THREADS URNS QL MICRO: ABNORMAL /LPF
NEUTROPHILS # BLD AUTO: 4.2 X10(3)/MCL (ref 2.1–9.2)
NEUTROPHILS NFR BLD AUTO: 63.9 %
NITRITE UR QL STRIP.AUTO: NEGATIVE
NRBC BLD AUTO-RTO: 0 %
PH UR STRIP.AUTO: 6 [PH]
PLATELET # BLD AUTO: 208 X10(3)/MCL (ref 130–400)
PMV BLD AUTO: 9.6 FL (ref 7.4–10.4)
POTASSIUM SERPL-SCNC: 4.7 MMOL/L (ref 3.5–5.1)
PROT SERPL-MCNC: 7.3 GM/DL (ref 5.8–7.6)
PROT UR QL STRIP.AUTO: NEGATIVE MG/DL
RBC # BLD AUTO: 5.36 X10(6)/MCL (ref 4.7–6.1)
RBC #/AREA URNS AUTO: ABNORMAL /HPF
RBC UR QL AUTO: NEGATIVE UNIT/L
SODIUM SERPL-SCNC: 138 MMOL/L (ref 136–145)
SP GR UR STRIP.AUTO: 1.03
TRIGL SERPL-MCNC: 86 MG/DL (ref 34–140)
UROBILINOGEN UR STRIP-ACNC: NORMAL MG/DL
VLDLC SERPL CALC-MCNC: 17 MG/DL
WBC # SPEC AUTO: 6.5 X10(3)/MCL (ref 4.5–11.5)
WBC #/AREA URNS AUTO: ABNORMAL /HPF

## 2022-09-30 PROCEDURE — 85025 COMPLETE CBC W/AUTO DIFF WBC: CPT

## 2022-09-30 PROCEDURE — 80061 LIPID PANEL: CPT

## 2022-09-30 PROCEDURE — 36415 COLL VENOUS BLD VENIPUNCTURE: CPT

## 2022-09-30 PROCEDURE — 80053 COMPREHEN METABOLIC PANEL: CPT

## 2022-10-04 ENCOUNTER — TELEPHONE (OUTPATIENT)
Dept: INTERNAL MEDICINE | Facility: CLINIC | Age: 72
End: 2022-10-04
Payer: COMMERCIAL

## 2022-10-04 NOTE — TELEPHONE ENCOUNTER
Pt called, name and  verified. Pt informed of recommendations for an low fat diet. Pt verbalized 100% understanding/ will cohere to that diet.No further questions or concerns noted. Call ended

## 2022-10-04 NOTE — TELEPHONE ENCOUNTER
----- Message from Jennifer Quiros MD sent at 9/30/2022  2:24 PM CDT -----  Low fat diet  Pl notify pt

## 2022-10-07 ENCOUNTER — HOSPITAL ENCOUNTER (OUTPATIENT)
Dept: RADIOLOGY | Facility: HOSPITAL | Age: 72
Discharge: HOME | End: 2022-10-07
Attending: INTERNAL MEDICINE
Payer: COMMERCIAL

## 2022-10-07 ENCOUNTER — TELEPHONE (OUTPATIENT)
Dept: INTERNAL MEDICINE | Facility: CLINIC | Age: 72
End: 2022-10-07
Payer: COMMERCIAL

## 2022-10-07 DIAGNOSIS — R10.9 ABDOMINAL PAIN, UNSPECIFIED ABDOMINAL LOCATION: ICD-10-CM

## 2022-10-07 DIAGNOSIS — M25.551 HIP PAIN, ACUTE, RIGHT: ICD-10-CM

## 2022-10-07 PROCEDURE — 74177 CT ABD & PELVIS W/CONTRAST: CPT | Mod: TC

## 2022-10-07 PROCEDURE — 73501 X-RAY EXAM HIP UNI 1 VIEW: CPT | Mod: TC,RT

## 2022-10-07 PROCEDURE — A9698 NON-RAD CONTRAST MATERIALNOC: HCPCS

## 2022-10-07 PROCEDURE — 25500020 PHARM REV CODE 255

## 2022-10-07 RX ADMIN — IOHEXOL 100 ML: 350 INJECTION, SOLUTION INTRAVENOUS at 08:10

## 2022-10-07 RX ADMIN — BARIUM SULFATE 450 ML: 20 SUSPENSION ORAL at 08:10

## 2022-10-07 NOTE — TELEPHONE ENCOUNTER
----- Message from Jennifer Quiros MD sent at 10/7/2022 10:21 AM CDT -----  Hip x ray looks good- pl notify pt

## 2022-10-07 NOTE — TELEPHONE ENCOUNTER
----- Message from Jennifer Quiros MD sent at 10/7/2022 10:23 AM CDT -----  CT looks unchanged- no acute findings- Chronic effusion and atelectasis in the left lower lobe- also unchanged  Pl notify pt

## 2022-10-10 ENCOUNTER — HOSPITAL ENCOUNTER (OUTPATIENT)
Dept: RADIOLOGY | Facility: HOSPITAL | Age: 72
Discharge: HOME | End: 2022-10-10
Attending: INTERNAL MEDICINE
Payer: COMMERCIAL

## 2022-10-10 DIAGNOSIS — M54.12 CERVICAL RADICULOPATHY: ICD-10-CM

## 2022-10-10 DIAGNOSIS — M54.9 DORSALGIA, UNSPECIFIED: ICD-10-CM

## 2022-10-10 PROCEDURE — 72148 MRI LUMBAR SPINE W/O DYE: CPT | Mod: TC

## 2022-10-10 PROCEDURE — 72141 MRI NECK SPINE W/O DYE: CPT | Mod: TC

## 2022-10-11 ENCOUNTER — TELEPHONE (OUTPATIENT)
Dept: INTERNAL MEDICINE | Facility: CLINIC | Age: 72
End: 2022-10-11
Payer: COMMERCIAL

## 2022-10-11 DIAGNOSIS — M54.16 LUMBAR RADICULOPATHY: Primary | ICD-10-CM

## 2022-10-11 NOTE — TELEPHONE ENCOUNTER
Verified pt  . Pt informed of above message . Verbalizes understanding of referral to Dr Gonzalez's office. Pt to expect a call for this Thursday as above indicates

## 2022-10-11 NOTE — TELEPHONE ENCOUNTER
----- Message from Jennifer Quiros MD sent at 10/11/2022  1:06 PM CDT -----  Results discussed with pt- by me.  He has been referred to Dr LOPEZ- I HAVE PLACED A REFERRAL in the chart- pt can be seen this Thursday

## 2022-10-20 DIAGNOSIS — F41.9 ANXIETY: ICD-10-CM

## 2022-10-20 RX ORDER — CLONAZEPAM 1 MG/1
1 TABLET ORAL 3 TIMES DAILY PRN
Qty: 90 TABLET | Refills: 0 | Status: SHIPPED | OUTPATIENT
Start: 2022-10-20 | End: 2023-01-19 | Stop reason: SDUPTHER

## 2022-10-20 NOTE — TELEPHONE ENCOUNTER
----- Message from Joan Braun sent at 10/19/2022  3:32 PM CDT -----  Regarding: [place]/med refyair [place]- [facility]

## 2022-11-15 ENCOUNTER — OFFICE VISIT (OUTPATIENT)
Dept: ORTHOPEDICS | Facility: CLINIC | Age: 72
End: 2022-11-15
Payer: COMMERCIAL

## 2022-11-15 ENCOUNTER — ANESTHESIA EVENT (OUTPATIENT)
Dept: SURGERY | Facility: HOSPITAL | Age: 72
DRG: 459 | End: 2022-11-15
Payer: COMMERCIAL

## 2022-11-15 ENCOUNTER — HOSPITAL ENCOUNTER (OUTPATIENT)
Dept: RADIOLOGY | Facility: HOSPITAL | Age: 72
Discharge: HOME | End: 2022-11-15
Attending: NURSE PRACTITIONER
Payer: COMMERCIAL

## 2022-11-15 VITALS — WEIGHT: 201.38 LBS | BODY MASS INDEX: 30.52 KG/M2 | HEIGHT: 68 IN

## 2022-11-15 DIAGNOSIS — M65.312 TRIGGER FINGER OF LEFT THUMB: ICD-10-CM

## 2022-11-15 DIAGNOSIS — R52 PAIN: ICD-10-CM

## 2022-11-15 PROCEDURE — 1101F PT FALLS ASSESS-DOCD LE1/YR: CPT | Mod: CPTII,,, | Performed by: NURSE PRACTITIONER

## 2022-11-15 PROCEDURE — 1125F AMNT PAIN NOTED PAIN PRSNT: CPT | Mod: CPTII,,, | Performed by: NURSE PRACTITIONER

## 2022-11-15 PROCEDURE — 3008F BODY MASS INDEX DOCD: CPT | Mod: CPTII,,, | Performed by: NURSE PRACTITIONER

## 2022-11-15 PROCEDURE — 99214 PR OFFICE/OUTPT VISIT, EST, LEVL IV, 30-39 MIN: ICD-10-PCS | Mod: 25,S$PBB,, | Performed by: NURSE PRACTITIONER

## 2022-11-15 PROCEDURE — 1159F MED LIST DOCD IN RCRD: CPT | Mod: CPTII,,, | Performed by: NURSE PRACTITIONER

## 2022-11-15 PROCEDURE — 99214 OFFICE O/P EST MOD 30 MIN: CPT | Mod: PBBFAC | Performed by: NURSE PRACTITIONER

## 2022-11-15 PROCEDURE — 20551: ICD-10-PCS | Mod: S$PBB,LT,, | Performed by: NURSE PRACTITIONER

## 2022-11-15 PROCEDURE — 1160F PR REVIEW ALL MEDS BY PRESCRIBER/CLIN PHARMACIST DOCUMENTED: ICD-10-PCS | Mod: CPTII,,, | Performed by: NURSE PRACTITIONER

## 2022-11-15 PROCEDURE — 99214 OFFICE O/P EST MOD 30 MIN: CPT | Mod: 25,S$PBB,, | Performed by: NURSE PRACTITIONER

## 2022-11-15 PROCEDURE — 1159F PR MEDICATION LIST DOCUMENTED IN MEDICAL RECORD: ICD-10-PCS | Mod: CPTII,,, | Performed by: NURSE PRACTITIONER

## 2022-11-15 PROCEDURE — 1160F RVW MEDS BY RX/DR IN RCRD: CPT | Mod: CPTII,,, | Performed by: NURSE PRACTITIONER

## 2022-11-15 PROCEDURE — 4010F ACE/ARB THERAPY RXD/TAKEN: CPT | Mod: CPTII,,, | Performed by: NURSE PRACTITIONER

## 2022-11-15 PROCEDURE — 1101F PR PT FALLS ASSESS DOC 0-1 FALLS W/OUT INJ PAST YR: ICD-10-PCS | Mod: CPTII,,, | Performed by: NURSE PRACTITIONER

## 2022-11-15 PROCEDURE — 73130 X-RAY EXAM OF HAND: CPT | Mod: TC,LT

## 2022-11-15 PROCEDURE — 1125F PR PAIN SEVERITY QUANTIFIED, PAIN PRESENT: ICD-10-PCS | Mod: CPTII,,, | Performed by: NURSE PRACTITIONER

## 2022-11-15 PROCEDURE — 3008F PR BODY MASS INDEX (BMI) DOCUMENTED: ICD-10-PCS | Mod: CPTII,,, | Performed by: NURSE PRACTITIONER

## 2022-11-15 PROCEDURE — 3288F PR FALLS RISK ASSESSMENT DOCUMENTED: ICD-10-PCS | Mod: CPTII,,, | Performed by: NURSE PRACTITIONER

## 2022-11-15 PROCEDURE — 4010F PR ACE/ARB THEARPY RXD/TAKEN: ICD-10-PCS | Mod: CPTII,,, | Performed by: NURSE PRACTITIONER

## 2022-11-15 PROCEDURE — 3288F FALL RISK ASSESSMENT DOCD: CPT | Mod: CPTII,,, | Performed by: NURSE PRACTITIONER

## 2022-11-15 PROCEDURE — 20551 NJX 1 TENDON ORIGIN/INSJ: CPT | Mod: PBBFAC | Performed by: NURSE PRACTITIONER

## 2022-11-15 RX ORDER — LIDOCAINE HYDROCHLORIDE 10 MG/ML
1 INJECTION, SOLUTION EPIDURAL; INFILTRATION; INTRACAUDAL; PERINEURAL
Status: COMPLETED | OUTPATIENT
Start: 2022-11-15 | End: 2022-11-15

## 2022-11-15 RX ORDER — TRIAMCINOLONE ACETONIDE 40 MG/ML
40 INJECTION, SUSPENSION INTRA-ARTICULAR; INTRAMUSCULAR
Status: DISPENSED | OUTPATIENT
Start: 2022-11-15

## 2022-11-15 RX ORDER — TRIAMCINOLONE ACETONIDE 40 MG/ML
40 INJECTION, SUSPENSION INTRA-ARTICULAR; INTRAMUSCULAR
Status: DISCONTINUED | OUTPATIENT
Start: 2022-11-15 | End: 2022-11-15 | Stop reason: HOSPADM

## 2022-11-15 RX ADMIN — TRIAMCINOLONE ACETONIDE 40 MG: 40 INJECTION, SUSPENSION INTRA-ARTICULAR; INTRAMUSCULAR at 11:11

## 2022-11-15 RX ADMIN — LIDOCAINE HYDROCHLORIDE 1 ML: 10 INJECTION, SOLUTION EPIDURAL; INFILTRATION; INTRACAUDAL; PERINEURAL at 11:11

## 2022-11-15 NOTE — PROGRESS NOTES
Subjective:       New pt   Patient ID: Nikolas Gutierrez is a 71 y.o. male. Non-smoker. Employment HX: maintenance, currently employed.    Seen [facility] ortho for same DX since n/a.   Chief Complaint: Pain of the Left Hand and Hand Pain (Trigger Thumb)    HPI:    Left aching and sharp  thumb  hand pain. Right dominate  Injury: no known injury  Onset: several weeks ago worsening over time  Modifying Factors: worse with activity, improved with rest, and increased pain at PM  Associated Symptoms: popping, swelling with increased activity, and decreased ROM  Activity: pain mildly interferes with ADLs  and normal activity without exercise or sports activity.   Previous Treatments: RX NSAIDs without significant relief.  PMH: negative for contraindications for NSAID use  Family History: + OA    Note: New onset left trigger finger w/ no conservative treatments.     Current Outpatient Medications:     atorvastatin (LIPITOR) 40 MG tablet, Take 40 mg by mouth once daily., Disp: , Rfl:     clonazePAM (KLONOPIN) 1 MG tablet, Take 1 tablet (1 mg total) by mouth 3 (three) times daily as needed for Anxiety., Disp: 90 tablet, Rfl: 0    clopidogreL (PLAVIX) 75 mg tablet, Take 75 mg by mouth Daily., Disp: , Rfl:     diclofenac (VOLTAREN) 75 MG EC tablet, Take 1 tablet (75 mg total) by mouth 2 (two) times daily., Disp: 60 tablet, Rfl: 2    fluorouraciL (EFUDEX) 5 % cream, Apply topically., Disp: , Rfl:     fluticasone propionate (FLONASE) 50 mcg/actuation nasal spray, 1 spray by Each Nostril route once daily., Disp: , Rfl:     furosemide (LASIX) 20 MG tablet, Take 20 mg by mouth once daily at 6am., Disp: , Rfl:     isosorbide mononitrate (IMDUR) 60 MG 24 hr tablet, Take 60 mg by mouth 2 (two) times daily., Disp: , Rfl:     loratadine (CLARITIN) 10 mg tablet, Take 10 mg by mouth once daily., Disp: , Rfl:     metoprolol tartrate (LOPRESSOR) 25 MG tablet, Take 1 tablet by mouth 2 (two) times a day., Disp: , Rfl:     nitroGLYCERIN  "(NITROSTAT) 0.4 MG SL tablet, SMARTSI Tablet(s) Sublingual PRN, Disp: , Rfl:     ranolazine (RANEXA) 500 MG Tb12, Take 500 mg by mouth 2 (two) times daily., Disp: , Rfl:     sildenafiL (VIAGRA) 50 MG tablet, Take 1 tablet (50 mg total) by mouth daily as needed for Erectile Dysfunction., Disp: 30 tablet, Rfl: 11    tamsulosin (FLOMAX) 0.4 mg Cap, Take 1 capsule (0.4 mg total) by mouth once daily., Disp: 30 capsule, Rfl: 11    testosterone cypionate (DEPOTESTOTERONE CYPIONATE) 200 mg/mL injection, Inject 1 mL (200 mg total) into the muscle every 14 (fourteen) days., Disp: 10 mL, Rfl: 1    XARELTO 2.5 mg Tab, Take 1 tablet by mouth 2 (two) times daily., Disp: , Rfl:     lisinopriL (PRINIVIL,ZESTRIL) 5 MG tablet, Take 5 mg by mouth Daily., Disp: , Rfl:   Review of patient's allergies indicates:   Allergen Reactions    Tomato Blisters     Hemoglobin A1c   Date Value Ref Range Status   2021 5.1 <<=7.0 % Final   2018 5.2 4.2 - 6.3 % Final   06/15/2017 5.3 4.7 - 5.6 % Final      Body mass index is 30.62 kg/m².   Vitals:    11/15/22 1020   Weight: 91.4 kg (201 lb 6.4 oz)   Height: 5' 8" (1.727 m)   PainSc:   6      Review of Systems:  A ten-point review of systems was performed and is negative, except as mentioned above.   Objective:   MSK Bilateral Hand/ Wrist  General:  no apparent distress, no pain indicators, well nourished  Inspection: no masses/cyst/nodules, no swelling, no erythema, no contusion, no deconditioning, no deformity, no contracture, no scars  Palpation:  tenderness noted to LEFT hand flexor tendon: left thumb, radial pulse equal 2+  ROM:    MCP/ PIP/ DIP Flexion   Smooth movement without limitation non-painful (R)   catching/ locking of thumb painful (L)   MCP/ PIP/ DIP Extension   Smooth movement without limitation non-painful (R)   catching/ locking of thumb painful (L)  Strength:        5 / 5 (R) 4 / 5 (L)  Special Testing:  Trigger Finger   -- (R)  + thumb (L)  Phalen    -- (R)  -- " (L)  Lazaro Carpal Compression -- (R)  -- (L)  Tinel Test    -- (R)  -- (L)  Finkelstein Test   -- (R)  -- (L)  Neurovascular: Intact to light touch  Neuro/ Psych: Awake, alert, oriented, normal mood and affect  Lymphatic: No LAD  Skin/ Soft Tissue: no rash, skin intact  Assessment:       Imaging: X-ray 3 views of left hand ordered, reviewed and independently interpreted by me. Discussed with patient. No acute findings .    1. BMI 30.0-30.9,adult    2. Trigger finger of left thumb      Plan:       Orders Placed This Encounter    X-Ray Hand Complete Left     Ongoing education about DX and treatment recommendations including conservative treatments of daily HEP and OTC NSAID as needed according to label instructions if able to tolerate.   Treatment plan: Trigger Finger left thumb Recommend daily HEP until RTC appt. and CSI today for symptom relief.  If inadequate at f/u will discuss referral to ortho res.    Thumb spica given for PRN symptom relief.   Procedure: CSI discussed, s/t failed conservative treatments patient consents to CSI today  RX Medications: continue medications as RX per PCP     RTC: PRN if symptoms worsen or return    Kamini Freeman FNP    Tendon Origin CSI Left Thumb Trigger Finger    Date/Time: 11/15/2022 11:20 AM  Performed by: Kamini Freeman NP  Authorized by: Kamini Freeman NP     Consent Done?:  Yes (Written)  Timeout: prior to procedure the correct patient, procedure, and site was verified    Indications:  Pain  Site marked: the procedure site was marked    Timeout: prior to procedure the correct patient, procedure, and site was verified    Location: Digit Flexor Tendon.  Ultrasonic Guidance for Needle Placement?: No    Needle size:  25 G  Approach:  Volar  Medications:  40 mg triamcinolone acetonide 40 mg/mL; 1 mL Lidocaine 1% (PF) 5 mL  Patient tolerance:  Patient tolerated the procedure well with no immediate complications   Topical ethyl chloride was applied. Contents of  syringe included: 1cc of 1% lidocaine with 40mg of Kenalog   Complications: None   EBL: None   Post Procedure: Patient alert, and moving all extremities. ROM improved, pain decreased.  Good peripheral pulses, no signs of vascular compromise and range of motion intact.  Aftercare instructions were given to patient at time of discharge.  Relative rest for 3 days-avoiding excess activity.  Place ice on the area for 15 minutes every 4-6 hours. Patient may take Tylenol a 1000 mg b.i.d. or ibuprofen 600 mg t.i.d. for the next 3-4 days if not on medication already and safe to take pending co-morbidities.  Protect the area for the next 1-8 hours if anesthetic was used.  Avoid excessive activity for the next 3-4 weeks.  ER precautions given for fever, severe joint pain or allergic reaction or other new symptoms related to the joint injection.         Timed Billing Note  Total Time Spent with Patient: 30 minutes Excludes Time Spent Performing Procedure  Visit Start Time: 1045  10 minutes spent prior to visit reviewing EMR, prior labs and x-rays.  10 minutes spent in visit with patient completing exam, obtaining history, educating on DX and treatment plan.  10 minutes spent after visit completing EMR documentation.   Visit End Time: 1115

## 2022-11-17 RX ORDER — FENOFIBRATE 145 MG/1
145 TABLET, FILM COATED ORAL DAILY
COMMUNITY
Start: 2022-11-09 | End: 2023-04-20 | Stop reason: SDUPTHER

## 2022-11-18 ENCOUNTER — HOSPITAL ENCOUNTER (INPATIENT)
Facility: HOSPITAL | Age: 72
LOS: 3 days | Discharge: HOME | DRG: 459 | End: 2022-11-21
Attending: NEUROLOGICAL SURGERY | Admitting: NEUROLOGICAL SURGERY
Payer: COMMERCIAL

## 2022-11-18 ENCOUNTER — ANESTHESIA (OUTPATIENT)
Dept: SURGERY | Facility: HOSPITAL | Age: 72
DRG: 459 | End: 2022-11-18
Payer: COMMERCIAL

## 2022-11-18 DIAGNOSIS — Z87.891 HISTORY OF SMOKING: ICD-10-CM

## 2022-11-18 DIAGNOSIS — M43.16 SPONDYLOLISTHESIS OF LUMBAR REGION: Primary | ICD-10-CM

## 2022-11-18 DIAGNOSIS — Z01.818 PRE-OP TESTING: ICD-10-CM

## 2022-11-18 LAB
ALBUMIN SERPL-MCNC: 3.8 GM/DL (ref 3.4–4.8)
ALBUMIN SERPL-MCNC: 4 GM/DL (ref 3.4–4.8)
ALBUMIN/GLOB SERPL: 1.2 RATIO (ref 1.1–2)
ALBUMIN/GLOB SERPL: 1.6 RATIO (ref 1.1–2)
ALP SERPL-CCNC: 42 UNIT/L (ref 40–150)
ALP SERPL-CCNC: 47 UNIT/L (ref 40–150)
ALT SERPL-CCNC: 20 UNIT/L (ref 0–55)
ALT SERPL-CCNC: 21 UNIT/L (ref 0–55)
AST SERPL-CCNC: 23 UNIT/L (ref 5–34)
AST SERPL-CCNC: 25 UNIT/L (ref 5–34)
BASOPHILS # BLD AUTO: 0.01 X10(3)/MCL (ref 0–0.2)
BASOPHILS NFR BLD AUTO: 0.2 %
BILIRUBIN DIRECT+TOT PNL SERPL-MCNC: 0.9 MG/DL
BILIRUBIN DIRECT+TOT PNL SERPL-MCNC: 1.3 MG/DL
BUN SERPL-MCNC: 26 MG/DL (ref 8.4–25.7)
BUN SERPL-MCNC: 26.4 MG/DL (ref 8.4–25.7)
CALCIUM SERPL-MCNC: 8.2 MG/DL (ref 8.8–10)
CALCIUM SERPL-MCNC: 9.4 MG/DL (ref 8.8–10)
CHLORIDE SERPL-SCNC: 103 MMOL/L (ref 98–107)
CHLORIDE SERPL-SCNC: 106 MMOL/L (ref 98–107)
CO2 SERPL-SCNC: 23 MMOL/L (ref 23–31)
CO2 SERPL-SCNC: 26 MMOL/L (ref 23–31)
CORRECTED TEMPERATURE (PCO2): 43 MMHG (ref 35–45)
CORRECTED TEMPERATURE (PCO2): 45 MMHG (ref 35–45)
CORRECTED TEMPERATURE (PH): 7.39 (ref 7.35–7.45)
CORRECTED TEMPERATURE (PH): 7.43 (ref 7.35–7.45)
CORRECTED TEMPERATURE (PO2): 151 MMHG (ref 80–100)
CORRECTED TEMPERATURE (PO2): 166 MMHG (ref 80–100)
CREAT SERPL-MCNC: 1.19 MG/DL (ref 0.73–1.18)
CREAT SERPL-MCNC: 1.22 MG/DL (ref 0.73–1.18)
EOSINOPHIL # BLD AUTO: 0.04 X10(3)/MCL (ref 0–0.9)
EOSINOPHIL NFR BLD AUTO: 0.7 %
ERYTHROCYTE [DISTWIDTH] IN BLOOD BY AUTOMATED COUNT: 13.6 % (ref 11.5–17)
GFR SERPLBLD CREATININE-BSD FMLA CKD-EPI: >60 MLS/MIN/1.73/M2
GFR SERPLBLD CREATININE-BSD FMLA CKD-EPI: >60 MLS/MIN/1.73/M2
GLOBULIN SER-MCNC: 2.5 GM/DL (ref 2.4–3.5)
GLOBULIN SER-MCNC: 3.3 GM/DL (ref 2.4–3.5)
GLUCOSE SERPL-MCNC: 139 MG/DL (ref 82–115)
GLUCOSE SERPL-MCNC: 93 MG/DL (ref 82–115)
GROUP & RH: NORMAL
HCO3 UR-SCNC: 27.2 MMOL/L (ref 22–26)
HCO3 UR-SCNC: 28.5 MMOL/L (ref 22–26)
HCT VFR BLD AUTO: 48.2 % (ref 42–52)
HGB BLD-MCNC: 15.4 GM/DL (ref 14–18)
HGB BLD-MCNC: 15.7 G/DL (ref 12–16)
HGB BLD-MCNC: 16.1 G/DL (ref 12–16)
IMM GRANULOCYTES # BLD AUTO: 0.01 X10(3)/MCL (ref 0–0.04)
IMM GRANULOCYTES NFR BLD AUTO: 0.2 %
INDIRECT COOMBS GEL: NORMAL
INR BLD: 1 (ref 0–1.3)
LYMPHOCYTES # BLD AUTO: 1.21 X10(3)/MCL (ref 0.6–4.6)
LYMPHOCYTES NFR BLD AUTO: 20.5 %
MCH RBC QN AUTO: 29.7 PG (ref 27–31)
MCHC RBC AUTO-ENTMCNC: 32 MG/DL (ref 33–36)
MCV RBC AUTO: 92.9 FL (ref 80–94)
MONOCYTES # BLD AUTO: 0.35 X10(3)/MCL (ref 0.1–1.3)
MONOCYTES NFR BLD AUTO: 5.9 %
NEUTROPHILS # BLD AUTO: 4.3 X10(3)/MCL (ref 2.1–9.2)
NEUTROPHILS NFR BLD AUTO: 72.5 %
NRBC BLD AUTO-RTO: 0 %
PCO2 BLDA: 43 MMHG (ref 35–45)
PCO2 BLDA: 45 MMHG (ref 35–45)
PH SMN: 7.39 [PH] (ref 7.35–7.45)
PH SMN: 7.43 [PH] (ref 7.35–7.45)
PLATELET # BLD AUTO: 204 X10(3)/MCL (ref 130–400)
PMV BLD AUTO: 10.3 FL (ref 7.4–10.4)
PO2 BLDA: 151 MMHG (ref 80–100)
PO2 BLDA: 166 MMHG (ref 80–100)
POC BASE DEFICIT: 1.6 MMOL/L (ref -2–2)
POC BASE DEFICIT: 3.6 MMOL/L (ref -2–2)
POC COHB: 1.1 %
POC COHB: 1.4 %
POC IONIZED CALCIUM: 1.05 MMOL/L (ref 1.12–1.23)
POC IONIZED CALCIUM: 1.07 MMOL/L (ref 1.12–1.23)
POC METHB: 0.7 % (ref 0.4–1.5)
POC METHB: 1 % (ref 0.4–1.5)
POC O2HB: 96.3 % (ref 94–97)
POC O2HB: 96.6 % (ref 94–97)
POC SATURATED O2: 99.3 %
POC SATURATED O2: 99.5 %
POC TEMPERATURE: 37 °C
POC TEMPERATURE: 37 °C
POTASSIUM BLD-SCNC: 3.5 MMOL/L (ref 3.5–5)
POTASSIUM BLD-SCNC: 3.7 MMOL/L (ref 3.5–5)
POTASSIUM SERPL-SCNC: 4.1 MMOL/L (ref 3.5–5.1)
POTASSIUM SERPL-SCNC: 4.1 MMOL/L (ref 3.5–5.1)
PROT SERPL-MCNC: 6.5 GM/DL (ref 5.8–7.6)
PROT SERPL-MCNC: 7.1 GM/DL (ref 5.8–7.6)
PROTHROMBIN TIME: 13.1 SECONDS (ref 12.5–14.5)
RBC # BLD AUTO: 5.19 X10(6)/MCL (ref 4.7–6.1)
SODIUM BLD-SCNC: 135 MMOL/L (ref 137–145)
SODIUM BLD-SCNC: 135 MMOL/L (ref 137–145)
SODIUM SERPL-SCNC: 138 MMOL/L (ref 136–145)
SODIUM SERPL-SCNC: 139 MMOL/L (ref 136–145)
SPECIMEN SOURCE: ABNORMAL
SPECIMEN SOURCE: ABNORMAL
WBC # SPEC AUTO: 5.9 X10(3)/MCL (ref 4.5–11.5)

## 2022-11-18 PROCEDURE — 25000003 PHARM REV CODE 250: Performed by: NEUROLOGICAL SURGERY

## 2022-11-18 PROCEDURE — 99900035 HC TECH TIME PER 15 MIN (STAT)

## 2022-11-18 PROCEDURE — 76937 US GUIDE VASCULAR ACCESS: CPT | Performed by: ANESTHESIOLOGY

## 2022-11-18 PROCEDURE — 63600175 PHARM REV CODE 636 W HCPCS

## 2022-11-18 PROCEDURE — 25000003 PHARM REV CODE 250: Performed by: STUDENT IN AN ORGANIZED HEALTH CARE EDUCATION/TRAINING PROGRAM

## 2022-11-18 PROCEDURE — 63600175 PHARM REV CODE 636 W HCPCS: Performed by: NEUROLOGICAL SURGERY

## 2022-11-18 PROCEDURE — 27000221 HC OXYGEN, UP TO 24 HOURS

## 2022-11-18 PROCEDURE — 86901 BLOOD TYPING SEROLOGIC RH(D): CPT | Performed by: NEUROLOGICAL SURGERY

## 2022-11-18 PROCEDURE — 36000711: Performed by: NEUROLOGICAL SURGERY

## 2022-11-18 PROCEDURE — 71000033 HC RECOVERY, INTIAL HOUR: Performed by: NEUROLOGICAL SURGERY

## 2022-11-18 PROCEDURE — 71000039 HC RECOVERY, EACH ADD'L HOUR: Performed by: NEUROLOGICAL SURGERY

## 2022-11-18 PROCEDURE — 37799 UNLISTED PX VASCULAR SURGERY: CPT

## 2022-11-18 PROCEDURE — 20000000 HC ICU ROOM

## 2022-11-18 PROCEDURE — 93010 ELECTROCARDIOGRAM REPORT: CPT | Mod: ,,, | Performed by: INTERNAL MEDICINE

## 2022-11-18 PROCEDURE — 85610 PROTHROMBIN TIME: CPT | Performed by: NEUROLOGICAL SURGERY

## 2022-11-18 PROCEDURE — 37000008 HC ANESTHESIA 1ST 15 MINUTES: Performed by: NEUROLOGICAL SURGERY

## 2022-11-18 PROCEDURE — 27200966 HC CLOSED SUCTION SYSTEM

## 2022-11-18 PROCEDURE — 25000003 PHARM REV CODE 250: Performed by: NURSE ANESTHETIST, CERTIFIED REGISTERED

## 2022-11-18 PROCEDURE — 93005 ELECTROCARDIOGRAM TRACING: CPT

## 2022-11-18 PROCEDURE — 27800903 OPTIME MED/SURG SUP & DEVICES OTHER IMPLANTS: Performed by: NEUROLOGICAL SURGERY

## 2022-11-18 PROCEDURE — 37000009 HC ANESTHESIA EA ADD 15 MINS: Performed by: NEUROLOGICAL SURGERY

## 2022-11-18 PROCEDURE — C1713 ANCHOR/SCREW BN/BN,TIS/BN: HCPCS | Performed by: NEUROLOGICAL SURGERY

## 2022-11-18 PROCEDURE — 82803 BLOOD GASES ANY COMBINATION: CPT

## 2022-11-18 PROCEDURE — 93010 EKG 12-LEAD: ICD-10-PCS | Mod: ,,, | Performed by: INTERNAL MEDICINE

## 2022-11-18 PROCEDURE — 63600175 PHARM REV CODE 636 W HCPCS: Performed by: NURSE ANESTHETIST, CERTIFIED REGISTERED

## 2022-11-18 PROCEDURE — 94761 N-INVAS EAR/PLS OXIMETRY MLT: CPT

## 2022-11-18 PROCEDURE — 36415 COLL VENOUS BLD VENIPUNCTURE: CPT | Performed by: NEUROLOGICAL SURGERY

## 2022-11-18 PROCEDURE — 63600175 PHARM REV CODE 636 W HCPCS: Performed by: ANESTHESIOLOGY

## 2022-11-18 PROCEDURE — 27201423 OPTIME MED/SURG SUP & DEVICES STERILE SUPPLY: Performed by: NEUROLOGICAL SURGERY

## 2022-11-18 PROCEDURE — 36000710: Performed by: NEUROLOGICAL SURGERY

## 2022-11-18 PROCEDURE — 85025 COMPLETE CBC W/AUTO DIFF WBC: CPT | Performed by: NEUROLOGICAL SURGERY

## 2022-11-18 PROCEDURE — 80053 COMPREHEN METABOLIC PANEL: CPT | Performed by: ANESTHESIOLOGY

## 2022-11-18 PROCEDURE — 80053 COMPREHEN METABOLIC PANEL: CPT | Performed by: NEUROLOGICAL SURGERY

## 2022-11-18 PROCEDURE — 36620 INSERTION CATHETER ARTERY: CPT | Performed by: ANESTHESIOLOGY

## 2022-11-18 DEVICE — IMPLANTABLE DEVICE: Type: IMPLANTABLE DEVICE | Site: SPINE LUMBAR | Status: FUNCTIONAL

## 2022-11-18 RX ORDER — BUPIVACAINE HYDROCHLORIDE AND EPINEPHRINE 5; 5 MG/ML; UG/ML
INJECTION, SOLUTION EPIDURAL; INTRACAUDAL; PERINEURAL
Status: DISCONTINUED | OUTPATIENT
Start: 2022-11-18 | End: 2022-11-18 | Stop reason: HOSPADM

## 2022-11-18 RX ORDER — CALCIUM CARBONATE 200(500)MG
500 TABLET,CHEWABLE ORAL DAILY PRN
Status: DISCONTINUED | OUTPATIENT
Start: 2022-11-18 | End: 2022-11-21 | Stop reason: HOSPADM

## 2022-11-18 RX ORDER — ACETAMINOPHEN 325 MG/1
650 TABLET ORAL EVERY 4 HOURS PRN
Status: DISCONTINUED | OUTPATIENT
Start: 2022-11-18 | End: 2022-11-21 | Stop reason: HOSPADM

## 2022-11-18 RX ORDER — FLUTICASONE PROPIONATE 50 MCG
1 SPRAY, SUSPENSION (ML) NASAL DAILY
Status: DISCONTINUED | OUTPATIENT
Start: 2022-11-18 | End: 2022-11-21 | Stop reason: HOSPADM

## 2022-11-18 RX ORDER — HYDROCODONE BITARTRATE AND ACETAMINOPHEN 5; 325 MG/1; MG/1
1 TABLET ORAL EVERY 4 HOURS PRN
Status: DISCONTINUED | OUTPATIENT
Start: 2022-11-18 | End: 2022-11-21 | Stop reason: HOSPADM

## 2022-11-18 RX ORDER — METOPROLOL TARTRATE 25 MG/1
25 TABLET, FILM COATED ORAL 2 TIMES DAILY
Status: DISCONTINUED | OUTPATIENT
Start: 2022-11-18 | End: 2022-11-21 | Stop reason: HOSPADM

## 2022-11-18 RX ORDER — SODIUM CHLORIDE 9 MG/ML
INJECTION, SOLUTION INTRAVENOUS CONTINUOUS
Status: DISCONTINUED | OUTPATIENT
Start: 2022-11-18 | End: 2022-11-21 | Stop reason: HOSPADM

## 2022-11-18 RX ORDER — ONDANSETRON 2 MG/ML
4 INJECTION INTRAMUSCULAR; INTRAVENOUS ONCE AS NEEDED
Status: ACTIVE | OUTPATIENT
Start: 2022-11-18 | End: 2034-04-16

## 2022-11-18 RX ORDER — ADHESIVE BANDAGE
30 BANDAGE TOPICAL DAILY PRN
Status: DISCONTINUED | OUTPATIENT
Start: 2022-11-18 | End: 2022-11-21 | Stop reason: HOSPADM

## 2022-11-18 RX ORDER — METHOCARBAMOL 750 MG/1
750 TABLET, FILM COATED ORAL 3 TIMES DAILY
Status: DISCONTINUED | OUTPATIENT
Start: 2022-11-18 | End: 2022-11-21 | Stop reason: HOSPADM

## 2022-11-18 RX ORDER — SODIUM CHLORIDE, SODIUM LACTATE, POTASSIUM CHLORIDE, CALCIUM CHLORIDE 600; 310; 30; 20 MG/100ML; MG/100ML; MG/100ML; MG/100ML
INJECTION, SOLUTION INTRAVENOUS CONTINUOUS
Status: ACTIVE | OUTPATIENT
Start: 2022-11-18

## 2022-11-18 RX ORDER — PROPOFOL 10 MG/ML
0-50 INJECTION, EMULSION INTRAVENOUS CONTINUOUS
Status: DISCONTINUED | OUTPATIENT
Start: 2022-11-18 | End: 2022-11-21 | Stop reason: HOSPADM

## 2022-11-18 RX ORDER — ONDANSETRON 2 MG/ML
INJECTION INTRAMUSCULAR; INTRAVENOUS
Status: DISCONTINUED | OUTPATIENT
Start: 2022-11-18 | End: 2022-11-18

## 2022-11-18 RX ORDER — ATORVASTATIN CALCIUM 40 MG/1
40 TABLET, FILM COATED ORAL DAILY
Status: DISCONTINUED | OUTPATIENT
Start: 2022-11-18 | End: 2022-11-21 | Stop reason: HOSPADM

## 2022-11-18 RX ORDER — HYDROMORPHONE HYDROCHLORIDE 2 MG/ML
0.2 INJECTION, SOLUTION INTRAMUSCULAR; INTRAVENOUS; SUBCUTANEOUS EVERY 5 MIN PRN
Status: DISPENSED | OUTPATIENT
Start: 2022-11-18

## 2022-11-18 RX ORDER — SUCCINYLCHOLINE CHLORIDE 20 MG/ML
INJECTION INTRAMUSCULAR; INTRAVENOUS
Status: DISCONTINUED | OUTPATIENT
Start: 2022-11-18 | End: 2022-11-18

## 2022-11-18 RX ORDER — CEFAZOLIN SODIUM 2 G/50ML
2 SOLUTION INTRAVENOUS
Status: DISPENSED | OUTPATIENT
Start: 2022-11-18 | End: 2022-11-19

## 2022-11-18 RX ORDER — ACETAMINOPHEN 500 MG
1000 TABLET ORAL
Status: COMPLETED | OUTPATIENT
Start: 2022-11-18 | End: 2022-11-18

## 2022-11-18 RX ORDER — FUROSEMIDE 20 MG/1
20 TABLET ORAL DAILY
Status: DISCONTINUED | OUTPATIENT
Start: 2022-11-18 | End: 2022-11-21 | Stop reason: HOSPADM

## 2022-11-18 RX ORDER — FAMOTIDINE 10 MG/ML
20 INJECTION INTRAVENOUS DAILY
Status: DISCONTINUED | OUTPATIENT
Start: 2022-11-18 | End: 2022-11-18

## 2022-11-18 RX ORDER — CEFAZOLIN SODIUM 2 G/50ML
2 SOLUTION INTRAVENOUS
Status: DISCONTINUED | OUTPATIENT
Start: 2022-11-18 | End: 2022-11-21 | Stop reason: HOSPADM

## 2022-11-18 RX ORDER — EPHEDRINE SULFATE 50 MG/ML
INJECTION, SOLUTION INTRAVENOUS
Status: DISCONTINUED | OUTPATIENT
Start: 2022-11-18 | End: 2022-11-18

## 2022-11-18 RX ORDER — ROCURONIUM BROMIDE 10 MG/ML
INJECTION, SOLUTION INTRAVENOUS
Status: DISCONTINUED | OUTPATIENT
Start: 2022-11-18 | End: 2022-11-18

## 2022-11-18 RX ORDER — FAMOTIDINE 10 MG/ML
20 INJECTION INTRAVENOUS DAILY
Status: DISCONTINUED | OUTPATIENT
Start: 2022-11-18 | End: 2022-11-21 | Stop reason: HOSPADM

## 2022-11-18 RX ORDER — AMOXICILLIN 250 MG
2 CAPSULE ORAL 2 TIMES DAILY
Status: DISCONTINUED | OUTPATIENT
Start: 2022-11-18 | End: 2022-11-21 | Stop reason: HOSPADM

## 2022-11-18 RX ORDER — FUROSEMIDE 10 MG/ML
INJECTION INTRAMUSCULAR; INTRAVENOUS
Status: COMPLETED
Start: 2022-11-18 | End: 2022-11-18

## 2022-11-18 RX ORDER — MORPHINE SULFATE 10 MG/ML
1 INJECTION INTRAMUSCULAR; INTRAVENOUS; SUBCUTANEOUS
Status: DISCONTINUED | OUTPATIENT
Start: 2022-11-18 | End: 2022-11-21 | Stop reason: HOSPADM

## 2022-11-18 RX ORDER — PROPOFOL 10 MG/ML
VIAL (ML) INTRAVENOUS
Status: DISCONTINUED | OUTPATIENT
Start: 2022-11-18 | End: 2022-11-18

## 2022-11-18 RX ORDER — PROCHLORPERAZINE EDISYLATE 5 MG/ML
10 INJECTION INTRAMUSCULAR; INTRAVENOUS EVERY 6 HOURS PRN
Status: DISCONTINUED | OUTPATIENT
Start: 2022-11-18 | End: 2022-11-21 | Stop reason: HOSPADM

## 2022-11-18 RX ORDER — MORPHINE SULFATE 10 MG/ML
4 INJECTION INTRAMUSCULAR; INTRAVENOUS; SUBCUTANEOUS
Status: DISCONTINUED | OUTPATIENT
Start: 2022-11-18 | End: 2022-11-21 | Stop reason: HOSPADM

## 2022-11-18 RX ORDER — HYDROMORPHONE HYDROCHLORIDE 2 MG/ML
INJECTION, SOLUTION INTRAMUSCULAR; INTRAVENOUS; SUBCUTANEOUS
Status: DISCONTINUED | OUTPATIENT
Start: 2022-11-18 | End: 2022-11-18

## 2022-11-18 RX ORDER — GABAPENTIN 300 MG/1
300 CAPSULE ORAL
Status: COMPLETED | OUTPATIENT
Start: 2022-11-18 | End: 2022-11-18

## 2022-11-18 RX ORDER — FUROSEMIDE 10 MG/ML
20 INJECTION INTRAMUSCULAR; INTRAVENOUS ONCE
Status: COMPLETED | OUTPATIENT
Start: 2022-11-18 | End: 2022-11-18

## 2022-11-18 RX ORDER — CETIRIZINE HYDROCHLORIDE 10 MG/1
10 TABLET ORAL DAILY
Status: DISCONTINUED | OUTPATIENT
Start: 2022-11-18 | End: 2022-11-21 | Stop reason: HOSPADM

## 2022-11-18 RX ORDER — CLONAZEPAM 1 MG/1
1 TABLET ORAL 3 TIMES DAILY PRN
Status: DISCONTINUED | OUTPATIENT
Start: 2022-11-18 | End: 2022-11-21 | Stop reason: HOSPADM

## 2022-11-18 RX ORDER — FENTANYL CITRATE 50 UG/ML
INJECTION, SOLUTION INTRAMUSCULAR; INTRAVENOUS
Status: DISCONTINUED | OUTPATIENT
Start: 2022-11-18 | End: 2022-11-18

## 2022-11-18 RX ORDER — PROPOFOL 10 MG/ML
INJECTION, EMULSION INTRAVENOUS
Status: COMPLETED
Start: 2022-11-18 | End: 2022-11-18

## 2022-11-18 RX ORDER — BISACODYL 10 MG
10 SUPPOSITORY, RECTAL RECTAL DAILY
Status: DISCONTINUED | OUTPATIENT
Start: 2022-11-18 | End: 2022-11-21 | Stop reason: HOSPADM

## 2022-11-18 RX ORDER — CEFAZOLIN SODIUM 1 G/3ML
INJECTION, POWDER, FOR SOLUTION INTRAMUSCULAR; INTRAVENOUS
Status: DISCONTINUED | OUTPATIENT
Start: 2022-11-18 | End: 2022-11-18 | Stop reason: HOSPADM

## 2022-11-18 RX ORDER — ENOXAPARIN SODIUM 100 MG/ML
40 INJECTION SUBCUTANEOUS EVERY 24 HOURS
Status: DISCONTINUED | OUTPATIENT
Start: 2022-11-20 | End: 2022-11-21 | Stop reason: HOSPADM

## 2022-11-18 RX ORDER — ALBUMIN HUMAN 250 G/1000ML
SOLUTION INTRAVENOUS CONTINUOUS PRN
Status: DISCONTINUED | OUTPATIENT
Start: 2022-11-18 | End: 2022-11-18

## 2022-11-18 RX ORDER — TAMSULOSIN HYDROCHLORIDE 0.4 MG/1
0.4 CAPSULE ORAL DAILY
Status: DISCONTINUED | OUTPATIENT
Start: 2022-11-18 | End: 2022-11-21 | Stop reason: HOSPADM

## 2022-11-18 RX ORDER — RANOLAZINE 500 MG/1
500 TABLET, EXTENDED RELEASE ORAL 2 TIMES DAILY
Status: DISCONTINUED | OUTPATIENT
Start: 2022-11-18 | End: 2022-11-21 | Stop reason: HOSPADM

## 2022-11-18 RX ORDER — FENOFIBRATE 145 MG/1
145 TABLET, FILM COATED ORAL DAILY
Status: DISCONTINUED | OUTPATIENT
Start: 2022-11-18 | End: 2022-11-21 | Stop reason: HOSPADM

## 2022-11-18 RX ORDER — ACETAMINOPHEN 325 MG/1
650 TABLET ORAL EVERY 6 HOURS PRN
Status: DISCONTINUED | OUTPATIENT
Start: 2022-11-18 | End: 2022-11-21 | Stop reason: HOSPADM

## 2022-11-18 RX ORDER — HYDROCODONE BITARTRATE AND ACETAMINOPHEN 7.5; 325 MG/1; MG/1
2 TABLET ORAL EVERY 4 HOURS PRN
Status: DISCONTINUED | OUTPATIENT
Start: 2022-11-18 | End: 2022-11-21 | Stop reason: HOSPADM

## 2022-11-18 RX ORDER — MAG HYDROX/ALUMINUM HYD/SIMETH 200-200-20
30 SUSPENSION, ORAL (FINAL DOSE FORM) ORAL EVERY 4 HOURS PRN
Status: DISCONTINUED | OUTPATIENT
Start: 2022-11-18 | End: 2022-11-21 | Stop reason: HOSPADM

## 2022-11-18 RX ORDER — MUPIROCIN 20 MG/G
OINTMENT TOPICAL 2 TIMES DAILY
Status: DISCONTINUED | OUTPATIENT
Start: 2022-11-18 | End: 2022-11-21 | Stop reason: HOSPADM

## 2022-11-18 RX ORDER — PHENYLEPHRINE HCL IN 0.9% NACL 1 MG/10 ML
SYRINGE (ML) INTRAVENOUS
Status: DISCONTINUED | OUTPATIENT
Start: 2022-11-18 | End: 2022-11-18

## 2022-11-18 RX ORDER — LIDOCAINE HYDROCHLORIDE 10 MG/ML
1 INJECTION, SOLUTION EPIDURAL; INFILTRATION; INTRACAUDAL; PERINEURAL ONCE
Status: ACTIVE | OUTPATIENT
Start: 2022-11-18

## 2022-11-18 RX ORDER — LIDOCAINE HYDROCHLORIDE 20 MG/ML
INJECTION INTRAVENOUS
Status: DISCONTINUED | OUTPATIENT
Start: 2022-11-18 | End: 2022-11-18

## 2022-11-18 RX ORDER — MIDAZOLAM HYDROCHLORIDE 1 MG/ML
INJECTION INTRAMUSCULAR; INTRAVENOUS
Status: COMPLETED
Start: 2022-11-18 | End: 2022-11-18

## 2022-11-18 RX ORDER — MORPHINE SULFATE 10 MG/ML
2 INJECTION INTRAMUSCULAR; INTRAVENOUS; SUBCUTANEOUS
Status: DISCONTINUED | OUTPATIENT
Start: 2022-11-18 | End: 2022-11-21 | Stop reason: HOSPADM

## 2022-11-18 RX ORDER — HYDROCODONE BITARTRATE AND ACETAMINOPHEN 10; 325 MG/1; MG/1
1 TABLET ORAL EVERY 4 HOURS PRN
Status: DISCONTINUED | OUTPATIENT
Start: 2022-11-18 | End: 2022-11-21 | Stop reason: HOSPADM

## 2022-11-18 RX ORDER — MIDAZOLAM HYDROCHLORIDE 1 MG/ML
2 INJECTION INTRAMUSCULAR; INTRAVENOUS ONCE
Status: COMPLETED | OUTPATIENT
Start: 2022-11-18 | End: 2022-11-18

## 2022-11-18 RX ORDER — DEXAMETHASONE SODIUM PHOSPHATE 4 MG/ML
INJECTION, SOLUTION INTRA-ARTICULAR; INTRALESIONAL; INTRAMUSCULAR; INTRAVENOUS; SOFT TISSUE
Status: DISCONTINUED | OUTPATIENT
Start: 2022-11-18 | End: 2022-11-18

## 2022-11-18 RX ORDER — ISOSORBIDE MONONITRATE 30 MG/1
60 TABLET, EXTENDED RELEASE ORAL 2 TIMES DAILY
Status: DISCONTINUED | OUTPATIENT
Start: 2022-11-18 | End: 2022-11-21 | Stop reason: HOSPADM

## 2022-11-18 RX ORDER — ONDANSETRON 2 MG/ML
4 INJECTION INTRAMUSCULAR; INTRAVENOUS
Status: COMPLETED | OUTPATIENT
Start: 2022-11-18 | End: 2022-11-18

## 2022-11-18 RX ORDER — ETOMIDATE 2 MG/ML
INJECTION INTRAVENOUS
Status: DISCONTINUED | OUTPATIENT
Start: 2022-11-18 | End: 2022-11-18

## 2022-11-18 RX ORDER — ONDANSETRON 4 MG/1
4 TABLET, ORALLY DISINTEGRATING ORAL EVERY 6 HOURS PRN
Status: DISCONTINUED | OUTPATIENT
Start: 2022-11-18 | End: 2022-11-21 | Stop reason: HOSPADM

## 2022-11-18 RX ADMIN — HYDROMORPHONE HYDROCHLORIDE 0.4 MG: 2 INJECTION, SOLUTION INTRAMUSCULAR; INTRAVENOUS; SUBCUTANEOUS at 10:11

## 2022-11-18 RX ADMIN — Medication 100 MCG: at 11:11

## 2022-11-18 RX ADMIN — ROCURONIUM BROMIDE 50 MG: 50 INJECTION INTRAVENOUS at 09:11

## 2022-11-18 RX ADMIN — FENTANYL CITRATE 100 MCG: 50 INJECTION, SOLUTION INTRAMUSCULAR; INTRAVENOUS at 09:11

## 2022-11-18 RX ADMIN — CEFAZOLIN SODIUM 2 G: 2 SOLUTION INTRAVENOUS at 04:11

## 2022-11-18 RX ADMIN — PROPOFOL 5 MCG/KG/MIN: 10 INJECTION, EMULSION INTRAVENOUS at 01:11

## 2022-11-18 RX ADMIN — ALBUMIN HUMAN: 250 SOLUTION INTRAVENOUS at 10:11

## 2022-11-18 RX ADMIN — Medication 100 MCG: at 09:11

## 2022-11-18 RX ADMIN — MIDAZOLAM HYDROCHLORIDE 2 MG: 1 INJECTION INTRAMUSCULAR; INTRAVENOUS at 08:11

## 2022-11-18 RX ADMIN — LIDOCAINE HYDROCHLORIDE 80 MG: 20 INJECTION INTRAVENOUS at 09:11

## 2022-11-18 RX ADMIN — ACETAMINOPHEN 1000 MG: 500 TABLET ORAL at 07:11

## 2022-11-18 RX ADMIN — ONDANSETRON 4 MG: 2 INJECTION INTRAMUSCULAR; INTRAVENOUS at 11:11

## 2022-11-18 RX ADMIN — HYDROCODONE BITARTRATE AND ACETAMINOPHEN 1 TABLET: 10; 325 TABLET ORAL at 10:11

## 2022-11-18 RX ADMIN — HYDROMORPHONE HYDROCHLORIDE 0.6 MG: 2 INJECTION, SOLUTION INTRAMUSCULAR; INTRAVENOUS; SUBCUTANEOUS at 10:11

## 2022-11-18 RX ADMIN — FUROSEMIDE 20 MG: 10 INJECTION, SOLUTION INTRAMUSCULAR; INTRAVENOUS at 01:11

## 2022-11-18 RX ADMIN — SODIUM CHLORIDE, SODIUM GLUCONATE, SODIUM ACETATE, POTASSIUM CHLORIDE AND MAGNESIUM CHLORIDE: 526; 502; 368; 37; 30 INJECTION, SOLUTION INTRAVENOUS at 09:11

## 2022-11-18 RX ADMIN — EPHEDRINE SULFATE 20 MG: 50 INJECTION INTRAVENOUS at 11:11

## 2022-11-18 RX ADMIN — ETOMIDATE 14 MG: 2 INJECTION INTRAVENOUS at 09:11

## 2022-11-18 RX ADMIN — RANOLAZINE 500 MG: 500 TABLET, EXTENDED RELEASE ORAL at 09:11

## 2022-11-18 RX ADMIN — FUROSEMIDE 20 MG: 10 INJECTION INTRAMUSCULAR; INTRAVENOUS at 01:11

## 2022-11-18 RX ADMIN — ROCURONIUM BROMIDE 20 MG: 50 INJECTION INTRAVENOUS at 11:11

## 2022-11-18 RX ADMIN — METHOCARBAMOL 750 MG: 750 TABLET ORAL at 09:11

## 2022-11-18 RX ADMIN — ROCURONIUM BROMIDE 50 MG: 50 INJECTION INTRAVENOUS at 10:11

## 2022-11-18 RX ADMIN — METOPROLOL TARTRATE 25 MG: 25 TABLET, FILM COATED ORAL at 09:11

## 2022-11-18 RX ADMIN — MUPIROCIN: 20 OINTMENT TOPICAL at 09:11

## 2022-11-18 RX ADMIN — FAMOTIDINE 20 MG: 10 INJECTION, SOLUTION INTRAVENOUS at 09:11

## 2022-11-18 RX ADMIN — DEXAMETHASONE SODIUM PHOSPHATE 8 MG: 4 INJECTION, SOLUTION INTRA-ARTICULAR; INTRALESIONAL; INTRAMUSCULAR; INTRAVENOUS; SOFT TISSUE at 10:11

## 2022-11-18 RX ADMIN — ONDANSETRON 4 MG: 2 INJECTION INTRAMUSCULAR; INTRAVENOUS at 07:11

## 2022-11-18 RX ADMIN — GABAPENTIN 300 MG: 300 CAPSULE ORAL at 07:11

## 2022-11-18 RX ADMIN — SENNOSIDES AND DOCUSATE SODIUM 2 TABLET: 50; 8.6 TABLET ORAL at 09:11

## 2022-11-18 RX ADMIN — SUGAMMADEX 180 MG: 100 INJECTION, SOLUTION INTRAVENOUS at 12:11

## 2022-11-18 RX ADMIN — PROPOFOL 30 MCG/KG/MIN: 10 INJECTION, EMULSION INTRAVENOUS at 10:11

## 2022-11-18 RX ADMIN — FENTANYL CITRATE 100 MCG: 50 INJECTION, SOLUTION INTRAMUSCULAR; INTRAVENOUS at 10:11

## 2022-11-18 RX ADMIN — EPHEDRINE SULFATE 10 MG: 50 INJECTION INTRAVENOUS at 12:11

## 2022-11-18 RX ADMIN — HYDROMORPHONE HYDROCHLORIDE 1 MG: 2 INJECTION, SOLUTION INTRAMUSCULAR; INTRAVENOUS; SUBCUTANEOUS at 12:11

## 2022-11-18 RX ADMIN — PROPOFOL 50 MG: 10 INJECTION, EMULSION INTRAVENOUS at 09:11

## 2022-11-18 RX ADMIN — MIDAZOLAM HYDROCHLORIDE 2 MG: 1 INJECTION, SOLUTION INTRAMUSCULAR; INTRAVENOUS at 08:11

## 2022-11-18 RX ADMIN — PROPOFOL 100 MG: 10 INJECTION, EMULSION INTRAVENOUS at 01:11

## 2022-11-18 RX ADMIN — SUCCINYLCHOLINE CHLORIDE 140 MG: 20 INJECTION, SOLUTION INTRAMUSCULAR; INTRAVENOUS at 01:11

## 2022-11-18 RX ADMIN — ISOSORBIDE MONONITRATE 60 MG: 30 TABLET, EXTENDED RELEASE ORAL at 09:11

## 2022-11-18 RX ADMIN — FUROSEMIDE 20 MG: 10 INJECTION, SOLUTION INTRAMUSCULAR; INTRAVENOUS at 02:11

## 2022-11-18 NOTE — ANESTHESIA PREPROCEDURE EVALUATION
11/17/2022  Nikolas Gutierrez is a 72 y.o., male    Procedure: FUSION, SPINE, LUMBAR, ALIF, USING COMPUTER-ASSISTED NAVIGATION - ANTERIOR LUMBAR INTERBODY FUSION L5/S1  // RIGHT L5/S1 FORAMINOTOMIES // DRILL // SCOPE // SUPINE // SKYTRON //  LUKE TO OPEN // DIRECT SPINE // NDM   Anesthesia type: General   Diagnosis: Spondylolisthesis of lumbar region [M43.16]   Pre-op diagnosis: Spondylolisthesis of lumbar region [M43.16]   Location: [facility] OR  / [facility] OR   Surgeons: Viral Gonzalez MD       Pre-op Assessment          Review of Systems  Social:  Former Smoker    Cardiovascular:   Hypertension CAD, s/p NSTEMI, s/p  stents x 12, DESC, mild MANSI s/p CABG multi vessel, LVEF 37%, stable angina uses up to 10 NTG per week   Pulmonary:   Sleep Apnea    Musculoskeletal:   Arthritis     Endocrine:  Endocrine Normal    Psych:   anxiety          Physical Exam  General: Alert and Oriented    Airway:  Mallampati: IV   Mouth Opening: Small, but > 3cm  TM Distance: 4 - 6 cm  Tongue: Large  Neck ROM: Extension Decreased    Dental:  Intact    Chest/Lungs:  Normal Respiratory Rate    Heart:  Rate: Normal  Rhythm: Regular Rhythm    LAB (11/10/22) : Na 140, K+ 5.0, BUN/ Cr 19/1.1, Gluc. 100     Latest Reference Range & Units 09/30/22 08:43   WBC 4.5 - 11.5 x10(3)/mcL 6.5   RBC 4.70 - 6.10 x10(6)/mcL 5.36   Hemoglobin 14.0 - 18.0 gm/dL 16.3   Hematocrit 42.0 - 52.0 % 48.9   MCV 80.0 - 94.0 fL 91.2   MCH 27.0 - 31.0 pg 30.4   MCHC 33.0 - 36.0 mg/dL 33.3   RDW 11.5 - 17.0 % 14.6   Platelets 130 - 400 x10(3)/mcL 208     LHC (4/22/22) : PCI of Rt. PDA,  LVEF 40%,  No [AS, AI, MR]                             LVEDP 14-15 mmHg    TTE (12/2020) :   - LVEF 37%, LV normal size, mild asymmetrical septal ventricular hypertrophy   - RV Fxn. Slightly decreased,  PASP 18 mmHg   - MR 1+    EKG (11/18/22) :  Sinus Eliezer / Primary AVB / Septal  Infarct    Anesthesia Plan  Type of Anesthesia, risks & benefits discussed:    Anesthesia Type: Gen ETT  Intra-op Monitoring Plan: Standard ASA Monitors and Art Line  Post Op Pain Control Plan: IV/PO Opioids PRN and multimodal analgesia  Induction:  IV  Airway Plan: Direct, Post-Induction  Informed Consent: Informed consent signed with the Patient and all parties understand the risks and agree with anesthesia plan.  All questions answered. Patient consented to blood products? Yes  ASA Score: 3  Day of Surgery Review of History & Physical: H&P Update referred to the surgeon/provider.H&P completed by Anesthesiologist.  Anesthesia Plan Notes: ERAS  Glidescope standby for intubation  T&S    Ready For Surgery From Anesthesia Perspective.     .

## 2022-11-18 NOTE — BRIEF OP NOTE
[facility] - Periop Services  Brief Operative Note    SUMMARY     Surgery Date: 11/18/2022     Surgeon(s) and Role:     * Viral Gonzalez MD - Primary     * Larry Carrera III, MD - Co-Surgeon (anterior portion)    Assisting: Elza Ruby    Pre-op Diagnosis:  Spondylolisthesis of lumbar region [M43.16]    Post-op Diagnosis:  Post-Op Diagnosis Codes:     * Spondylolisthesis of lumbar region [M43.16]    Procedure(s) (LRB):  FUSION, SPINE, LUMBAR, ALIF, USING COMPUTER-ASSISTED NAVIGATION (N/A)    ALIF L5/S1 with Camber cage (36mm x 32mm x 13xx at 15 degrees) with [employer]tronic Infuse. C-arm assisted.  Posterior Right L5/S1 MIS foraminotomy. Microscope assisted dissection. C-arm assisted.    Anesthesia: General    Operative Findings: Dictated     Estimated Blood Loss: * No values recorded between 11/18/2022 10:04 AM and 11/18/2022 12:38 PM *    Estimated Blood Loss has been documented.         Specimens:   Specimen (24h ago, onward)      None          Elza Ruby     [id number]

## 2022-11-18 NOTE — ANESTHESIA PROCEDURE NOTES
Arterial    Diagnosis: ALIF with significant CAD    Patient location during procedure: holding area  Procedure start time: 11/18/2022 8:21 AM  Timeout: 11/18/2022 8:17 AM  Procedure end time: 11/18/2022 8:29 AM    Staffing  Authorizing Provider: Christopher Ojeda MD  Performing Provider: Christopher Ojeda MD    Anesthesiologist was present at the time of the procedure.    Preanesthetic Checklist  Completed: patient identified, IV checked, site marked, risks and benefits discussed, surgical consent, monitors and equipment checked, pre-op evaluation, timeout performed and anesthesia consent givenArterial  Skin Prep: chlorhexidine gluconate and isopropyl alcohol  Local Infiltration: lidocaine  Orientation: left  Location: radial    Catheter Size: 20 G  Catheter placement by Ultrasound guidance and Anatomical landmarks. Heme positive aspiration all ports.   Vessel Caliber: medium, patent, compressibility normal  Vascular Doppler:  not done  Needle advanced into vessel with real time Ultrasound guidance.  Guidewire confirmed in vessel.  Sterile sheath used.  Image recorded and saved.Insertion Attempts: 1  Assessment  Dressing: secured with tape and tegaderm  Patient: Tolerated well  Additional Notes  Pulsatile flow, Hep-flushed 4 ml

## 2022-11-18 NOTE — OP NOTE
[facility]                       [address]                      [place] [id number]    PATIENT NAME:      JOANN WELLS  YOB: 1950  CSN:               [id number]  MRN:               [id number]  ADMIT DATE:        11/18/2022 05:09:00  PHYSICIAN:         Viral Gonzalez MD                          OPERATIVE REPORT      DATE OF SURGERY:    11/18/2022 00:00:00    SURGEON:  Viral Gonzalez MD    ASSISTANT:  For first part is Dr. Valdez.  For the second part, Elza Ruby.    PREOPERATIVE DIAGNOSIS:  Anterior lumbar discectomy, L5-S1 back pain, leg pain,   foraminal pressure.    POSTOPERATIVE DIAGNOSIS:  Anterior lumbar discectomy, L5-S1 back pain, leg pain,   foraminal pressure.    PROCEDURES:    1. Lumbar interbody fusion, L5-S1, using Camber 26 x 32 x 13 with 15 degrees lordosis with   Medtronic Infuse.    2. Right L5-S1 foraminotomy.    CONSENT:  Various kinds of treatment were discussed.  He is  now here for ALIF and   MIS foraminotomy.  The risks, benefits, consent were discussed.  We spent some   time going over everything again.  He wanted to proceed with the operative   procedure and went over the risks and consent in detail.    OPERATIVE PROCEDURE:  Operating room, intubated, front was opened by Dr. Valdez.    Subsequently scrubbed in, and I used various mireya and pedals and were able to   improve the height and put a 26 x 32 x 13 mm Camber cage packed with the   Medtronic Infuse at a 15-degree lordosis.  Once that was done, the ratcheting was done and   the cages attached and cap was put.  Patient was then closed by Dr. Valdez, then the   patient turned prone.  Back was prepped and draped.    Then, I scrubbed in again with my assistant Elza Ruby.  We put a tube there.    Did a hemilaminectomy, medial facet foraminotomy, freeing the nerve root   completely.  That was nicely freed up on the right side.  Thorough foraminotomy   was   obtained.  Wound was then closed with Moon Kay.  Wound was   closed with Vicryl 0, 3-0 Vicryl running subcu.  There were no issues or   complications.        ______________________________  MD KRYSTA Moreno/SERG  DD:  11/18/2022  Time:  12:38PM  DT:  11/18/2022  Time:  01:04PM  Job #:  [id number]    cc:   MD Elza Moreno Ruby        OPERATIVE REPORT

## 2022-11-18 NOTE — ANESTHESIA PROCEDURE NOTES
Intubation    Date/Time: 11/18/2022 1:02 PM  Performed by: Reza Wolf CRNA  Authorized by: Christopher Ojeda MD     Intubation:     Induction:  Intravenous    Intubated:  Postinduction    Mask Ventilation:  Moderately difficult with oral airway    Attempts:  1    Attempted By:  Staff anesthesiologist    Method of Intubation:  Video laryngoscopy    Blade:  Carlos 4    Laryngeal View Grade: Grade I - full view of cords      Difficult Airway Encountered?: No      Complications:  None    Airway Device:  Oral endotracheal tube    Airway Device Size:  8.0    Style/Cuff Inflation:  Cuffed (inflated to minimal occlusive pressure)    Tube secured:  23    Secured at:  The lips    Placement Verified By:  Capnometry    Complicating Factors:  None    Findings Post-Intubation:  BS equal bilateral and atraumatic/condition of teeth unchanged

## 2022-11-18 NOTE — ANESTHESIA PROCEDURE NOTES
Intubation    Date/Time: 11/18/2022 9:48 AM  Performed by: Reza Wolf CRNA  Authorized by: Christopher Ojeda MD     Intubation:     Induction:  Intravenous    Intubated:  Postinduction    Mask Ventilation:  Easy with oral airway    Attempts:  1    Attempted By:  CRNA    Method of Intubation:  Video laryngoscopy    Blade:  Carlos 4    Laryngeal View Grade: Grade I - full view of cords      Difficult Airway Encountered?: No      Complications:  None    Airway Device:  Oral endotracheal tube    Airway Device Size:  8.0    Style/Cuff Inflation:  Cuffed (inflated to minimal occlusive pressure)    Tube secured:  23    Secured at:  The lips    Placement Verified By:  Capnometry    Complicating Factors:  None    Findings Post-Intubation:  BS equal bilateral and atraumatic/condition of teeth unchanged

## 2022-11-18 NOTE — TRANSFER OF CARE
"Anesthesia Transfer of Care Note    Patient: Nikolas Gutierrez    Procedure(s) Performed: Procedure(s) (LRB):  FUSION, SPINE, LUMBAR, ALIF, USING COMPUTER-ASSISTED NAVIGATION (N/A)    Patient location: PACU    Anesthesia Type: general    Transport from OR: Transported from OR intubated on 100% O2 by AMBU with adequate controlled ventilation. Upon arrival to PACU/ICU, patient attached to ventilator and auscultated to confirm bilateral breath sounds and adequate TV. Continuous ECG monitoring in transport. Continuous SpO2 monitoring in transport. Continuos invasive BP monitoring in transport    Post pain: adequate analgesia    Post assessment: no apparent anesthetic complications and tolerated procedure well    Post vital signs: stable    Level of consciousness: sedated    Nausea/Vomiting: no nausea/vomiting    Complications: other (see comments)    Transfer of care protocol was followedComments: Laryngospasm      Last vitals:   Visit Vitals  /64   Pulse (!) 58   Temp 36.7 °C (98.1 °F)   Resp 20   Ht 5' 8" (1.727 m)   Wt 87.3 kg (192 lb 7.4 oz)   SpO2 100%   BMI 29.26 kg/m²     "

## 2022-11-19 LAB
ALBUMIN SERPL-MCNC: 3.5 GM/DL (ref 3.4–4.8)
ALBUMIN/GLOB SERPL: 1.7 RATIO (ref 1.1–2)
ALP SERPL-CCNC: 35 UNIT/L (ref 40–150)
ALT SERPL-CCNC: 16 UNIT/L (ref 0–55)
AST SERPL-CCNC: 20 UNIT/L (ref 5–34)
BILIRUBIN DIRECT+TOT PNL SERPL-MCNC: 0.9 MG/DL
BUN SERPL-MCNC: 28.7 MG/DL (ref 8.4–25.7)
CALCIUM SERPL-MCNC: 8.1 MG/DL (ref 8.8–10)
CHLORIDE SERPL-SCNC: 102 MMOL/L (ref 98–107)
CO2 SERPL-SCNC: 25 MMOL/L (ref 23–31)
CORRECTED TEMPERATURE (PCO2): 37 MMHG (ref 35–45)
CORRECTED TEMPERATURE (PH): 7.49 (ref 7.35–7.45)
CORRECTED TEMPERATURE (PO2): 110 MMHG (ref 80–100)
CREAT SERPL-MCNC: 1.33 MG/DL (ref 0.73–1.18)
GFR SERPLBLD CREATININE-BSD FMLA CKD-EPI: 56 MLS/MIN/1.73/M2
GLOBULIN SER-MCNC: 2.1 GM/DL (ref 2.4–3.5)
GLUCOSE SERPL-MCNC: 129 MG/DL (ref 82–115)
HCO3 UR-SCNC: 28.2 MMOL/L (ref 22–26)
HGB BLD-MCNC: 13.9 G/DL (ref 12–16)
PCO2 BLDA: 37 MMHG (ref 35–45)
PH SMN: 7.49 [PH] (ref 7.35–7.45)
PO2 BLDA: 110 MMHG (ref 80–100)
POC BASE DEFICIT: 4.7 MMOL/L (ref -2–2)
POC COHB: 3 %
POC IONIZED CALCIUM: 1.07 MMOL/L (ref 1.12–1.23)
POC METHB: 0.1 % (ref 0.4–1.5)
POC O2HB: 97.2 % (ref 94–97)
POC SATURATED O2: 98.6 %
POC TEMPERATURE: 37 °C
POTASSIUM BLD-SCNC: 3.2 MMOL/L (ref 3.5–5)
POTASSIUM SERPL-SCNC: 3.3 MMOL/L (ref 3.5–5.1)
PROT SERPL-MCNC: 5.6 GM/DL (ref 5.8–7.6)
SODIUM BLD-SCNC: 134 MMOL/L (ref 137–145)
SODIUM SERPL-SCNC: 137 MMOL/L (ref 136–145)
SPECIMEN SOURCE: ABNORMAL

## 2022-11-19 PROCEDURE — 94003 VENT MGMT INPAT SUBQ DAY: CPT

## 2022-11-19 PROCEDURE — 80053 COMPREHEN METABOLIC PANEL: CPT | Performed by: INTERNAL MEDICINE

## 2022-11-19 PROCEDURE — 63600175 PHARM REV CODE 636 W HCPCS: Performed by: NEUROLOGICAL SURGERY

## 2022-11-19 PROCEDURE — 27000221 HC OXYGEN, UP TO 24 HOURS

## 2022-11-19 PROCEDURE — 37799 UNLISTED PX VASCULAR SURGERY: CPT

## 2022-11-19 PROCEDURE — 63600175 PHARM REV CODE 636 W HCPCS: Performed by: ANESTHESIOLOGY

## 2022-11-19 PROCEDURE — 97162 PT EVAL MOD COMPLEX 30 MIN: CPT

## 2022-11-19 PROCEDURE — 36415 COLL VENOUS BLD VENIPUNCTURE: CPT | Performed by: INTERNAL MEDICINE

## 2022-11-19 PROCEDURE — 99900035 HC TECH TIME PER 15 MIN (STAT)

## 2022-11-19 PROCEDURE — 25000003 PHARM REV CODE 250: Performed by: STUDENT IN AN ORGANIZED HEALTH CARE EDUCATION/TRAINING PROGRAM

## 2022-11-19 PROCEDURE — 97166 OT EVAL MOD COMPLEX 45 MIN: CPT

## 2022-11-19 PROCEDURE — 82803 BLOOD GASES ANY COMBINATION: CPT

## 2022-11-19 PROCEDURE — 20000000 HC ICU ROOM

## 2022-11-19 PROCEDURE — 25000003 PHARM REV CODE 250: Performed by: NEUROLOGICAL SURGERY

## 2022-11-19 RX ORDER — PROPOFOL 10 MG/ML
INJECTION, EMULSION INTRAVENOUS
Status: DISPENSED
Start: 2022-11-19 | End: 2022-11-19

## 2022-11-19 RX ADMIN — SENNOSIDES AND DOCUSATE SODIUM 2 TABLET: 50; 8.6 TABLET ORAL at 08:11

## 2022-11-19 RX ADMIN — MUPIROCIN: 20 OINTMENT TOPICAL at 09:11

## 2022-11-19 RX ADMIN — MUPIROCIN: 20 OINTMENT TOPICAL at 08:11

## 2022-11-19 RX ADMIN — METHOCARBAMOL 750 MG: 750 TABLET ORAL at 08:11

## 2022-11-19 RX ADMIN — ACETAMINOPHEN 650 MG: 325 TABLET ORAL at 04:11

## 2022-11-19 RX ADMIN — METOPROLOL TARTRATE 25 MG: 25 TABLET, FILM COATED ORAL at 08:11

## 2022-11-19 RX ADMIN — SODIUM CHLORIDE: 9 INJECTION, SOLUTION INTRAVENOUS at 04:11

## 2022-11-19 RX ADMIN — CETIRIZINE HYDROCHLORIDE 10 MG: 10 TABLET, FILM COATED ORAL at 08:11

## 2022-11-19 RX ADMIN — FAMOTIDINE 20 MG: 10 INJECTION, SOLUTION INTRAVENOUS at 08:11

## 2022-11-19 RX ADMIN — PROPOFOL 40 MCG/KG/MIN: 10 INJECTION, EMULSION INTRAVENOUS at 02:11

## 2022-11-19 RX ADMIN — RANOLAZINE 500 MG: 500 TABLET, EXTENDED RELEASE ORAL at 08:11

## 2022-11-19 RX ADMIN — ATORVASTATIN CALCIUM 40 MG: 40 TABLET, FILM COATED ORAL at 08:11

## 2022-11-19 RX ADMIN — HYDROCODONE BITARTRATE AND ACETAMINOPHEN 1 TABLET: 5; 325 TABLET ORAL at 04:11

## 2022-11-19 RX ADMIN — ISOSORBIDE MONONITRATE 60 MG: 30 TABLET, EXTENDED RELEASE ORAL at 08:11

## 2022-11-19 RX ADMIN — METHOCARBAMOL 750 MG: 750 TABLET ORAL at 02:11

## 2022-11-19 RX ADMIN — FUROSEMIDE 20 MG: 20 TABLET ORAL at 08:11

## 2022-11-19 RX ADMIN — FENOFIBRATE 145 MG: 145 TABLET, FILM COATED ORAL at 08:11

## 2022-11-19 RX ADMIN — TAMSULOSIN HYDROCHLORIDE 0.4 MG: 0.4 CAPSULE ORAL at 08:11

## 2022-11-19 RX ADMIN — HYDROMORPHONE HYDROCHLORIDE 0.2 MG: 2 INJECTION, SOLUTION INTRAMUSCULAR; INTRAVENOUS; SUBCUTANEOUS at 02:11

## 2022-11-19 RX ADMIN — CEFAZOLIN SODIUM 2 G: 2 SOLUTION INTRAVENOUS at 12:11

## 2022-11-19 RX ADMIN — HYDROCODONE BITARTRATE AND ACETAMINOPHEN 2 TABLET: 7.5; 325 TABLET ORAL at 08:11

## 2022-11-19 NOTE — PT/OT/SLP EVAL
Occupational Therapy   Evaluation    Name: Nikolas Gutierrez  MRN: [id number]  Admitting Diagnosis:  Spondylolisthesis of lumbar region  Recent Surgery: Procedure(s) (LRB):  FUSION, SPINE, LUMBAR, ALIF, USING COMPUTER-ASSISTED NAVIGATION (N/A) 1 Day Post-Op    Recommendations:     Discharge Recommendations: home  Discharge Equipment Recommendations:  shower chair (Reacher (patient has sock aide and long-handled shoe horn))  Barriers to discharge:  None    Assessment:     Nikolas Gutierrez is a 72 y.o. male with a medical diagnosis of Spondylolisthesis of lumbar region s/p  L5/S1 ALIF, posterior R L5/S1 foraminotomy. Performance deficits affecting function: weakness, gait instability, impaired balance, impaired self care skills, impaired functional mobility.      Rehab Prognosis: Good; patient would benefit from acute skilled OT services to address these deficits and reach maximum level of function.       Plan:     Patient to be seen 5 x/week, 6 x/week to address the above listed problems via self-care/home management, therapeutic activities, therapeutic exercises  Plan of Care Expires: 12/05/22  Plan of Care Reviewed with: patient, spouse    Subjective     Chief Complaint: sore throat following recent extubation  Patient/Family Comments/goals: to return home    Occupational Profile:  Living Environment: Patient lives with wife, 2 daughters, and 5 grandchildren in a SLH with no steps to enter. Primary bathroom is a tub/shower combo.   Previous level of function: Independent and active; works at [employer]   Roles and Routines: employed, grandfather, father,   Equipment Used at Home:  none  Assistance upon Discharge: wife and family    Pain/Comfort:  Pain Rating 1: 0/10    Patients cultural, spiritual, Zoroastrianism conflicts given the current situation:      Objective:     Communicated with: nrsg prior to session.  Patient found HOB elevated with pulse ox (continuous), telemetry, blood pressure cuff, kunz catheter,  peripheral IV, arterial line upon OT entry to room.    General Precautions: Standard,     Orthopedic Precautions:spinal precautions   Braces: N/A  Respiratory Status: Nasal cannula, flow 1.5 L/min  /50    HR  81   SPO2 96%    Occupational Performance:    Bed Mobility:    Patient completed Rolling/Turning to Left with  minimum assistance  Patient completed Supine to Sit with minimum assistance using log roll technique    Functional Mobility/Transfers:  Patient completed Sit <> Stand Transfer with contact guard assistance  with  rolling walker   Patient completed Bed <> Chair Transfer using Step Transfer technique with contact guard assistance with rolling walker  Patient completed Toilet Transfer Step Transfer technique with contact guard assistance with  rolling walker  Functional Mobility: Patient ambulated 100ft with RW and CGA.     Activities of Daily Living:  Lower Body Dressing: maximal assistance in supine. Patient provided assistance to maintain spinal pxns. Patient reports he has a sock aide at home that he uses daily.   Toileting: minimum assistance for clothing management    Cognitive/Visual Perceptual:  Cognitive/Psychosocial Skills:     -       Oriented to: Person, Place, Time, and Situation   -       Follows Commands/attention:Follows multistep  commands    Physical Exam:  Upper Extremity Strength:    -       Right Upper Extremity: WFL (tested within cervical pxns)  -       Left Upper Extremity: WFL (tested within cervical pxns)   Strength:    -       Right Upper Extremity: WFL  -       Left Upper Extremity: WFL  Fine Motor Coordination:    -       Intact finger opposition    Treatment & Education:  Patient and family educated on OT POC, verbalized understanding. Patient educated on need to call for assistance with return to bed and ambulation to bathroom, verbalized understanding.     Patient left up in chair with all lines intact, call button in reach, and nurse present    GOALS:    Multidisciplinary Problems       Occupational Therapy Goals          Problem: Occupational Therapy    Goal Priority Disciplines Outcome Interventions   Occupational Therapy Goal     OT, PT/OT Ongoing, Progressing    Description: Goals to be met by: 12/5/2022     Patient will increase functional independence with ADLs by performing:    UE Dressing with Modified [place].  LE Dressing with Modified [place].  Grooming while standing at sink with Modified [place].  Toileting from toilet with Modified [place] for hygiene and clothing management.   Bathing from  standing with Modified [place].  Toilet transfer to toilet with Modified [place].                         History:     Past Medical History:   Diagnosis Date    Actinic keratoses     Acute non-ST elevation myocardial infarction (NSTEMI) 03/2019    Basal cell carcinoma (BCC) of skin of lower extremity including hip 08/14/2015    R mid-distal shin, failed therapy w/ Aldera cream, s/p electrodessication & curettage 12/2015    CAD, multiple vessel     Calculus of kidney 05/23/2022    s/p lithotripsy years ago    Empyema of left pleural space     History of excision of pilonidal cyst     Other seasonal allergic rhinitis     Severe acute respiratory syndrome coronavirus 2 (SARS-CoV-2) vaccination not indicated 05/23/2022    Problem added via discern rule sz_[id number]_pos_neg    Simple hepatic cyst     R dome    Skin cancer          Past Surgical History:   Procedure Laterality Date    angiogram with 12 stents      cabgx2      cabgx4      LITHOTRIPSY      pilonidial cyst excision      TOTAL HIP ARTHROPLASTY Right 05/13/2014    Secondary to avascular necrosis of femoral head       Time Tracking:     OT Date of Treatment: 11/19/22  OT Start Time: 0955  OT Stop Time: 1037  OT Total Time (min): 42 min    Billable Minutes:Evaluation MOD 42 min    11/19/2022

## 2022-11-19 NOTE — ANESTHESIA POSTPROCEDURE EVALUATION
Anesthesia Post Evaluation    Patient: Nikolas Gutierrez    Procedure(s) Performed: Procedure(s) (LRB):  FUSION, SPINE, LUMBAR, ALIF, USING COMPUTER-ASSISTED NAVIGATION (N/A)    Final Anesthesia Type: general      Patient location during evaluation: PACU  Patient participation: No - Unable to Participate, Intubation  Level of consciousness: sedated  Post-procedure vital signs: reviewed and stable  Pain management: adequate  Airway patency: patent  BEENA mitigation strategies: Multimodal analgesia  PONV status at discharge: No PONV  Anesthetic complications: yes  Perioperative Events: unplanned ICU admission and reintubation        Cardiovascular status: hemodynamically stable  Respiratory status: ventilator  Hydration status: euvolemic  Follow-up needed           Vitals Value Taken Time   BP 96/61 11/18/22 2000   Temp 36.3 °C (97.3 °F) 11/18/22 1842   Pulse 72 11/18/22 2008   Resp 0 11/18/22 2008   SpO2 96 % 11/18/22 2008   Vitals shown include unvalidated device data.      Event Time   Out of Recovery 18:28:00         Pain/Carlos Score: Pain Rating Prior to Med Admin: 3 (11/18/2022  7:01 AM)  Carlos Score: 4 (11/18/2022  2:28 PM)

## 2022-11-19 NOTE — NURSING
Nurses Note -- 4 Eyes      11/19/2022   7:04 AM      Skin assessed during: Admit      [x] No Pressure Injuries Present    [x]Prevention Measures Documented      [] Yes- Altered Skin Integrity Present or Discovered   [] LDA Added if Not in Epic (Describe Wound)   [] New Altered Skin Integrity was Present on Admit and Documented in LDA   [] Wound Image Taken    Wound Care Consulted? No    Attending Nurse:  Brianna Orozco RN     Second RN/Staff Member:  YAMILET Cox

## 2022-11-19 NOTE — PLAN OF CARE
Problem: Adult Inpatient Plan of Care  Goal: Plan of Care Review  Outcome: Ongoing, Progressing  Goal: Patient-Specific Goal (Individualized)  Outcome: Ongoing, Progressing  Goal: Absence of Hospital-Acquired Illness or Injury  Outcome: Ongoing, Progressing  Goal: Optimal Comfort and Wellbeing  Outcome: Ongoing, Progressing  Goal: Readiness for Transition of Care  Outcome: Ongoing, Progressing     Problem: Infection  Goal: Absence of Infection Signs and Symptoms  Outcome: Ongoing, Progressing     Problem: Communication Impairment (Mechanical Ventilation, Invasive)  Goal: Effective Communication  Outcome: Ongoing, Progressing     Problem: Device-Related Complication Risk (Mechanical Ventilation, Invasive)  Goal: Optimal Device Function  Outcome: Ongoing, Progressing     Problem: Inability to Wean (Mechanical Ventilation, Invasive)  Goal: Mechanical Ventilation Liberation  Outcome: Ongoing, Progressing     Problem: Nutrition Impairment (Mechanical Ventilation, Invasive)  Goal: Optimal Nutrition Delivery  Outcome: Ongoing, Progressing     Problem: Skin and Tissue Injury (Mechanical Ventilation, Invasive)  Goal: Absence of Device-Related Skin and Tissue Injury  Outcome: Ongoing, Progressing

## 2022-11-19 NOTE — PLAN OF CARE
Problem: Occupational Therapy  Goal: Occupational Therapy Goal  Description: Goals to be met by: 12/5/2022     Patient will increase functional independence with ADLs by performing:    UE Dressing with Modified [place].  LE Dressing with Modified [place].  Grooming while standing at sink with Modified [place].  Toileting from toilet with Modified [place] for hygiene and clothing management.   Bathing from  standing with Modified [place].  Toilet transfer to toilet with Modified [place].    11/19/2022 1315 by Janel Romero OT  Outcome: Ongoing, Progressing

## 2022-11-19 NOTE — H&P
[facility] - Periop Services  Pulmonary Critical Care Note    Patient Name: Nikolas Gutierrez  MRN: [id number]  Admission Date: 11/18/2022  Hospital Length of Stay: 0 days  Code Status: No Order  Attending Provider: Viral Gonzalez MD  Primary Care Provider: Jennifer Quiros MD     Subjective:     HPI: This is a 72-year-old with history of anterior lumbar discectomy (L5-S1) who presented to the hospital today for elective L5-S1 interbody fusion along with L5-S1 foraminotomy.  Patient reportedly tolerated the procedure but upon being transferred to the PACU, was unable to be extubated.  During an attempt for extubation, patient was noted to have a significant amount of secretions.  Initial ABG showed 7.39/45/51/27.2 chest x-ray showed concern for aspiration present in the right upper to mid lung.  Patient was given 20 of Lasix x2 but was unable to wean FiO2 past 60%.  Most recent ABG showed 7.43/43/166/28.5.  Patient was then transferred to the ICU for close monitoring.  Upon coming to the ICU, patient was noted to be of 30 propofol and with 40 cc of urine noted in the [place]    Past medical history:  Coronary artery disease  Hypertension  Hypercholesterolemia  Spondylosis of the lumbosacral spine    Past surgical history:  Right hip arthroplasty  CABG x2  ELYSIA x12  Tonsillectomy  Adenoidectomy      Social history:  Admits to moderate alcohol use  Remote smoking history, quit in 1975  Denies illicit drug use    Current Outpatient Medications   Medication Instructions    atorvastatin (LIPITOR) 40 mg, Oral, Daily    clonazePAM (KLONOPIN) 1 mg, Oral, 3 times daily PRN    clopidogreL (PLAVIX) 75 mg, Oral, Daily    diclofenac (VOLTAREN) 75 mg, Oral, 2 times daily    fenofibrate (TRICOR) 145 mg, Oral, Daily    fluorouraciL (EFUDEX) 5 % cream Topical    fluticasone propionate (FLONASE) 50 mcg/actuation nasal spray 1 spray, Each Nostril, Daily    furosemide (LASIX) 20 mg, Oral, Daily    isosorbide mononitrate (IMDUR) 60  mg, Oral, 2 times daily    loratadine (CLARITIN) 10 mg, Oral, Daily    metoprolol tartrate (LOPRESSOR) 25 MG tablet 1 tablet, Oral, 2 times daily    nitroGLYCERIN (NITROSTAT) 0.4 MG SL tablet SMARTSI Tablet(s) Sublingual PRN    ranolazine (RANEXA) 500 mg, Oral, 2 times daily    sildenafiL (VIAGRA) 50 mg, Oral, Daily PRN    tamsulosin (FLOMAX) 0.4 mg, Oral, Daily    testosterone cypionate (DEPOTESTOTERONE CYPIONATE) 200 mg, Intramuscular, Every 14 days    XARELTO 2.5 mg Tab 1 tablet, Oral, 2 times daily       Current Inpatient Medications   atorvastatin  40 mg Oral Daily    bisacodyL  10 mg Rectal Daily    ceFAZolin (ANCEF) IVPB  2 g Intravenous Q8H    cetirizine  10 mg Oral Daily    [START ON 2022] enoxaparin  40 mg Subcutaneous Daily    fenofibrate  145 mg Oral Daily    fluticasone propionate  1 spray Each Nostril Daily    furosemide  20 mg Oral Daily    isosorbide mononitrate  60 mg Oral BID    LIDOcaine (PF) 10 mg/ml (1%)  1 mL Intradermal Once    methocarbamoL  750 mg Oral TID    metoprolol tartrate  25 mg Oral BID    mupirocin   Nasal BID    ranolazine  500 mg Oral BID    senna-docusate 8.6-50 mg  2 tablet Oral BID    tamsulosin  0.4 mg Oral Daily       Current Intravenous Infusions   sodium chloride 0.9%      lactated ringers      propofoL 5 mcg/kg/min (22 1320)         Review of Systems   Unable to perform ROS: Intubated        Objective:       Intake/Output Summary (Last 24 hours) at 2022 1805  Last data filed at 2022 1708  Gross per 24 hour   Intake 2175 ml   Output 2375 ml   Net -200 ml         Vital Signs (Most Recent):  Temp: 98.1 °F (36.7 °C) (22 1311)  Pulse: 64 (22 1800)  Resp: 15 (22 1800)  BP: (!) 98/58 (22 1800)  SpO2: 100 % (22 1800)    Body mass index is 29.26 kg/m².  Weight: 87.3 kg (192 lb 7.4 oz) Vital Signs (24h Range):  Temp:  [97.7 °F (36.5 °C)-98.1 °F (36.7 °C)] 98.1 °F (36.7 °C)  Pulse:  [49-88] 64  Resp:  [11-20] 15  SpO2:  [94  %-100 %] 100 %  BP: ()/(58-77) 98/58     Physical Exam  Vitals and nursing note reviewed.   Constitutional:       Comments: Patient is intubated and sedated   HENT:      Head: Normocephalic and atraumatic.      Mouth/Throat:      Comments: OG tube is noted with red secretions noted  Cardiovascular:      Rate and Rhythm: Normal rate and regular rhythm.      Pulses: Normal pulses.      Heart sounds: No murmur heard.  Pulmonary:      Effort: Pulmonary effort is normal. No respiratory distress.      Breath sounds: Normal breath sounds.   Abdominal:      General: Abdomen is flat. There is no distension.      Palpations: Abdomen is soft.   Musculoskeletal:         General: No swelling or tenderness. Normal range of motion.   Skin:     General: Skin is warm and dry.      Capillary Refill: Capillary refill takes 2 to 3 seconds.   Neurological:      Comments: Patient is able to follow commands while on minimal sedation.         Lines/Drains/Airways       Drain  Duration                  Urethral Catheter 11/18/22 0953 Straight-tip 16 Fr. <1 day              Airway  Duration                  Airway - Non-Surgical 11/18/22 1302 <1 day              Arterial Line  Duration             Arterial Line 11/18/22 0821 Left Radial <1 day              Peripheral Intravenous Line  Duration                  Peripheral IV - Single Lumen 18 G Left Antecubital -- days         Peripheral IV - Single Lumen 11/18/22 0733 18 G Posterior;Right Forearm <1 day                    Significant Labs:    Lab Results   Component Value Date    WBC 5.9 11/18/2022    HGB 15.4 11/18/2022    HCT 48.2 11/18/2022    MCV 92.9 11/18/2022     11/18/2022         BMP  Lab Results   Component Value Date     11/18/2022    K 4.1 11/18/2022    CO2 23 11/18/2022    BUN 26.4 (H) 11/18/2022    CREATININE 1.22 (H) 11/18/2022    CALCIUM 8.2 (L) 11/18/2022    EGFRNONAA 84 (L) 05/13/2021       ABG  Recent Labs   Lab 11/18/22  1710   PH 7.43   PO2 166*   PCO2  43   HCO3 28.5*       Mechanical Ventilation Support:  Vent Mode: VOLUME A/C (11/18/22 1710)  Ventilator Initiated: Yes (11/18/22 1310)  Set Rate: 20 BPM (11/18/22 1710)  Vt Set: 450 mL (11/18/22 1710)  PEEP/CPAP: 5 cmH20 (11/18/22 1710)  Oxygen Concentration (%): 60 (11/18/22 1710)  Peak Airway Pressure: 38 cmH2O (11/18/22 1310)  Total Ve: 11.1 L/m (11/18/22 1310)    Significant Imaging:  Chest x-ray:  Per my interpretation, trachea is midline ET tube is approximately 3 cm above the prince, there are no obvious bony abnormalities, there are no diaphragmatic abnormalities, and there is opacities note to the right lung, upper and middle lobes      Assessment/Plan:     Assessment  Anterior lumbar discectomy (L5-S1) status post lumbar interbody fusion and foraminotomy  Acute respiratory failure requiring intubation  Concern for aspiration  Coronary artery disease status post CABG x2 and ELYSIA x12  Hypertension  Hypercholesteremia      Plan  Will wean FiO2 as tolerated will get repeat patient is currently on furosemide 20 mg daily.  Can give a dose of IV Lasix the patient started to desaturate.  Patient remains afebrile appear to be altered.  Hold off on antibiotics at this time.  We will restart home medications as tolerated.    DVT Prophylaxis:  Lovenox  GI Prophylaxis: Famotidine     32 minutes of critical care was time spent personally by me on the following activities: development of treatment plan with patient or surrogate and bedside caregivers, discussions with consultants, evaluation of patient's response to treatment, examination of patient, ordering and performing treatments and interventions, ordering and review of laboratory studies, ordering and review of radiographic studies, pulse oximetry, re-evaluation of patient's condition.  This critical care time did not overlap with that of any other provider or involve time for any procedures.     Charlie Mendoza MD  Pulmonary Critical Care Medicine  [facility] - Periop Services

## 2022-11-19 NOTE — PLAN OF CARE
Problem: Adult Inpatient Plan of Care  Goal: Plan of Care Review  11/19/2022 0736 by Brianna Orozco RN  Outcome: Ongoing, Progressing  11/18/2022 1850 by Brianna Orozco RN  Outcome: Ongoing, Progressing  Goal: Patient-Specific Goal (Individualized)  11/19/2022 0736 by Brianna Orozco RN  Outcome: Ongoing, Progressing  11/18/2022 1850 by Brianna Orozco RN  Outcome: Ongoing, Progressing  Goal: Absence of Hospital-Acquired Illness or Injury  11/19/2022 0736 by Brianna Orozco RN  Outcome: Ongoing, Progressing  11/18/2022 1850 by Brianna Orozco RN  Outcome: Ongoing, Progressing  Goal: Optimal Comfort and Wellbeing  11/19/2022 0736 by Brianna Orozco RN  Outcome: Ongoing, Progressing  11/18/2022 1850 by Brianna Orozco RN  Outcome: Ongoing, Progressing  Goal: Readiness for Transition of Care  11/19/2022 0736 by Brianna Orozco RN  Outcome: Ongoing, Progressing  11/18/2022 1850 by Brianna Orozco RN  Outcome: Ongoing, Progressing     Problem: Infection  Goal: Absence of Infection Signs and Symptoms  11/19/2022 0736 by Brianna Orozco RN  Outcome: Ongoing, Progressing  11/18/2022 1850 by Brianna Orozco RN  Outcome: Ongoing, Progressing     Problem: Communication Impairment (Mechanical Ventilation, Invasive)  Goal: Effective Communication  11/19/2022 0736 by Brianna Orozco RN  Outcome: Ongoing, Progressing  11/18/2022 1850 by Brianna Orozco RN  Outcome: Ongoing, Progressing     Problem: Device-Related Complication Risk (Mechanical Ventilation, Invasive)  Goal: Optimal Device Function  Outcome: Ongoing, Progressing     Problem: Nutrition Impairment (Mechanical Ventilation, Invasive)  Goal: Optimal Nutrition Delivery  Outcome: Ongoing, Progressing     Problem: Skin and Tissue Injury (Mechanical Ventilation, Invasive)  Goal: Absence of Device-Related Skin and Tissue Injury  Outcome: Ongoing, Progressing

## 2022-11-19 NOTE — PT/OT/SLP EVAL
Physical Therapy Evaluation    Patient Name:  Nikolas Gutierrez   MRN:  [id number]    Recommendations:     Discharge Recommendations:  rehabilitation facility, other (see comments), outpatient PT (pending progress)   Discharge Equipment Recommendations: walker, rolling   Barriers to discharge: Medical status    Assessment:     Nikolas Gutierrez is a 72 y.o. male admitted with a medical diagnosis of Spondylolisthesis of lumbar region.  He presents with the following impairments/functional limitations:  weakness, impaired functional mobility, gait instability, orthopedic precautions .    Rehab Prognosis: Good; patient would benefit from acute skilled PT services to address these deficits and reach maximum level of function.    Recent Surgery: Procedure(s) (LRB):  FUSION, SPINE, LUMBAR, ALIF, USING COMPUTER-ASSISTED NAVIGATION (N/A) 1 Day Post-Op    Plan:     During this hospitalization, patient to be seen daily to address the identified rehab impairments via gait training, therapeutic activities, therapeutic exercises, neuromuscular re-education and progress toward the following goals:    Plan of Care Expires:  12/19/22    Subjective     Chief Complaint: Feeling stiff and weak.  Patient/Family Comments/goals: To get strong enough to return home.  Pain/Comfort:  Pain Rating 1: 0/10    Patients cultural, spiritual, Methodist conflicts given the current situation: no    Living Environment:  Lives with wife in SL home without steps.  Prior to admission, patients level of function was independent with gait and ADLs.  Equipment used at home: cane, straight.  DME owned (not currently used): single point cane.  Upon discharge, patient will have assistance from his wife.    Objective:     Communicated with nurse prior to session.  Patient found HOB elevated with peripheral IV, kunz catheter, telemetry, oxygen  upon PT entry to room.    General Precautions: Standard,     Orthopedic Precautions:spinal precautions   Braces:     Respiratory Status: Nasal cannula, flow 1.5 L/min    Exams:  Gross Motor Coordination:  WFL  RLE ROM: WNL  RLE Strength: WNL  LLE ROM: WNL  LLE Strength: WNL    Functional Mobility:  Bed Mobility:     Supine to Sit: minimum assistance  Sit to Supine: minimum assistance  Transfers:     Sit to Stand:  minimum assistance with rolling walker  Gait: Pt ambulates 100 ft with CGA and RW.      AM-PAC 6 CLICK MOBILITY  Total Score:        Treatment & Education:  Toilet transfers      Patient left up in chair with call button in reach, nurse notified, and wife present.    GOALS:   Multidisciplinary Problems       Physical Therapy Goals          Problem: Physical Therapy    Goal Priority Disciplines Outcome Goal Variances Interventions   Physical Therapy Goal     PT, PT/OT Ongoing, Progressing     Description: Goals to be met by: 12/19/22     Patient will increase functional independence with mobility by performing:    . Supine to sit with [place]  . Sit to supine with [place]  . Sit to stand transfer with [place]  . Gait  x 400 feet with Modified [place] using Rolling Walker.                          History:     Past Medical History:   Diagnosis Date    Actinic keratoses     Acute non-ST elevation myocardial infarction (NSTEMI) 03/2019    Basal cell carcinoma (BCC) of skin of lower extremity including hip 08/14/2015    R mid-distal shin, failed therapy w/ Aldera cream, s/p electrodessication & curettage 12/2015    CAD, multiple vessel     Calculus of kidney 05/23/2022    s/p lithotripsy years ago    Empyema of left pleural space     History of excision of pilonidal cyst     Other seasonal allergic rhinitis     Severe acute respiratory syndrome coronavirus 2 (SARS-CoV-2) vaccination not indicated 05/23/2022    Problem added via discern rule sz_[id number]_pos_neg    Simple hepatic cyst     R dome    Skin cancer        Past Surgical History:   Procedure Laterality Date    angiogram with 12 stents      cabgx2       cabgx4      LITHOTRIPSY      pilonidial cyst excision      TOTAL HIP ARTHROPLASTY Right 05/13/2014    Secondary to avascular necrosis of femoral head       Time Tracking:     PT Received On: 11/19/22  PT Start Time: 0955     PT Stop Time: 1040  PT Total Time (min): 45 min     Billable Minutes: Evaluation 45      11/19/2022

## 2022-11-19 NOTE — PLAN OF CARE
Problem: Physical Therapy  Goal: Physical Therapy Goal  Description: Goals to be met by: 12/19/22     Patient will increase functional independence with mobility by performing:    . Supine to sit with [place]  . Sit to supine with [place]  . Sit to stand transfer with [place]  . Gait  x 400 feet with Modified [place] using Rolling Walker.     Outcome: Ongoing, Progressing

## 2022-11-20 PROCEDURE — 20000000 HC ICU ROOM

## 2022-11-20 PROCEDURE — 25000242 PHARM REV CODE 250 ALT 637 W/ HCPCS: Performed by: NEUROLOGICAL SURGERY

## 2022-11-20 PROCEDURE — 25000003 PHARM REV CODE 250: Performed by: STUDENT IN AN ORGANIZED HEALTH CARE EDUCATION/TRAINING PROGRAM

## 2022-11-20 PROCEDURE — 63600175 PHARM REV CODE 636 W HCPCS: Performed by: NEUROLOGICAL SURGERY

## 2022-11-20 PROCEDURE — 94761 N-INVAS EAR/PLS OXIMETRY MLT: CPT

## 2022-11-20 PROCEDURE — 25000003 PHARM REV CODE 250: Performed by: NEUROLOGICAL SURGERY

## 2022-11-20 PROCEDURE — 27000221 HC OXYGEN, UP TO 24 HOURS

## 2022-11-20 RX ADMIN — ISOSORBIDE MONONITRATE 60 MG: 30 TABLET, EXTENDED RELEASE ORAL at 08:11

## 2022-11-20 RX ADMIN — SENNOSIDES AND DOCUSATE SODIUM 2 TABLET: 50; 8.6 TABLET ORAL at 08:11

## 2022-11-20 RX ADMIN — HYDROCODONE BITARTRATE AND ACETAMINOPHEN 1 TABLET: 10; 325 TABLET ORAL at 08:11

## 2022-11-20 RX ADMIN — METHOCARBAMOL 750 MG: 750 TABLET ORAL at 08:11

## 2022-11-20 RX ADMIN — ATORVASTATIN CALCIUM 40 MG: 40 TABLET, FILM COATED ORAL at 08:11

## 2022-11-20 RX ADMIN — METOPROLOL TARTRATE 25 MG: 25 TABLET, FILM COATED ORAL at 08:11

## 2022-11-20 RX ADMIN — ENOXAPARIN SODIUM 40 MG: 40 INJECTION SUBCUTANEOUS at 05:11

## 2022-11-20 RX ADMIN — METHOCARBAMOL 750 MG: 750 TABLET ORAL at 03:11

## 2022-11-20 RX ADMIN — TAMSULOSIN HYDROCHLORIDE 0.4 MG: 0.4 CAPSULE ORAL at 08:11

## 2022-11-20 RX ADMIN — FENOFIBRATE 145 MG: 145 TABLET, FILM COATED ORAL at 08:11

## 2022-11-20 RX ADMIN — RANOLAZINE 500 MG: 500 TABLET, EXTENDED RELEASE ORAL at 08:11

## 2022-11-20 RX ADMIN — HYDROCODONE BITARTRATE AND ACETAMINOPHEN 2 TABLET: 7.5; 325 TABLET ORAL at 06:11

## 2022-11-20 RX ADMIN — HYDROCODONE BITARTRATE AND ACETAMINOPHEN 1 TABLET: 10; 325 TABLET ORAL at 11:11

## 2022-11-20 RX ADMIN — MUPIROCIN: 20 OINTMENT TOPICAL at 08:11

## 2022-11-20 RX ADMIN — FAMOTIDINE 20 MG: 10 INJECTION, SOLUTION INTRAVENOUS at 08:11

## 2022-11-20 RX ADMIN — FUROSEMIDE 20 MG: 20 TABLET ORAL at 08:11

## 2022-11-20 RX ADMIN — HYDROCODONE BITARTRATE AND ACETAMINOPHEN 2 TABLET: 7.5; 325 TABLET ORAL at 05:11

## 2022-11-20 RX ADMIN — FLUTICASONE PROPIONATE 50 MCG: 50 SPRAY, METERED NASAL at 11:11

## 2022-11-20 RX ADMIN — CETIRIZINE HYDROCHLORIDE 10 MG: 10 TABLET, FILM COATED ORAL at 08:11

## 2022-11-20 RX ADMIN — HYDROCODONE BITARTRATE AND ACETAMINOPHEN 2 TABLET: 7.5; 325 TABLET ORAL at 01:11

## 2022-11-20 NOTE — TRANSFER OF CARE
"Anesthesia Transfer of Care Note    Patient: Nikolas Gutierrez    Procedure(s) Performed: Procedure(s) (LRB):  FUSION, SPINE, LUMBAR, ALIF, USING COMPUTER-ASSISTED NAVIGATION (N/A)    Patient location: PACU    Anesthesia Type: general    Transport from OR: Upon arrival to PACU/ICU, patient attached to ventilator and auscultated to confirm bilateral breath sounds and adequate TV. Transported from OR intubated on 100% O2 by AMBU with adequate controlled ventilation. Continuous ECG monitoring in transport. Continuous SpO2 monitoring in transport. Continuos invasive BP monitoring in transport    Post pain: adequate analgesia    Post vital signs: stable    Level of consciousness: sedated    Nausea/Vomiting: no nausea/vomiting    Complications: other (see comments)    Transfer of care protocol was followedComments: Laryngospasm post-extubation in OR, Re-intubated in OR .  Upon arrival to PACU, pink froth noted in ETT, after placed on full ventilatory support. Suctioned ETT and Lasix ordered for suspected Negative Pressure Pulmonary Edema and possible aspiration. Instructions given to RN for close observation, continued Propofol sedation/CXR/ABG's/possible ICU admission and planned weaning / extubation once Pulmonary edema resolves. I notified Carlos Merino and patient's spouse.      Last vitals:   Visit Vitals  /75   Pulse 73   Temp 36.9 °C (98.5 °F) (Oral)   Resp 16   Ht 5' 8" (1.727 m)   Wt 87.2 kg (192 lb 3.9 oz)   SpO2 96%   BMI 29.23 kg/m²     "

## 2022-11-20 NOTE — PROGRESS NOTES
"Inpatient Nutrition Evaluation    Admit Date: 2022   Total duration of encounter: 2 days    Nutrition Recommendation/Prescription     Continue oral diet as tolerated.  Add Boost Glucose Control (provides 250 kcal, 14 g protein per serving).  Encouraged intake.    Nutrition Assessment     Chart Review    Reason Seen: physician consult to "evaluate"    Diagnosis:  Anterior lumbar discectomy (L5-S1) status post lumbar interbody fusion and foraminotomy  Acute respiratory failure requiring intubation  Concern for aspiration  Coronary artery disease status post CABG x2 and ELYSIA x12  Hypertension  Hypercholesteremia    Relevant Medical History:   Coronary artery disease  Hypertension  Hypercholesterolemia  Spondylosis of the lumbosacral spine    Nutrition-Related Medications: NS, bisacodyl, famotidine, furosemide, senna-docusate    Nutrition-Related Labs:  22 K 3.3, Glu 129    Diet Order: Diet Adult Regular  Oral Supplement Order: none  Appetite/Oral Intake: fair/50-75% of meals  Factors Affecting Nutritional Intake: constipation and decreased appetite  Food/Samaritan/Cultural Preferences: none reported  Food Allergies:  tomatoes  cause blisters on neck per patient       Wound(s):  surgical incision    Comments    22 Patient in ICU, extubated, reports decreased intake/appetite since surgery, c/o constipation, agreeable to chocolate Boost, denies weight loss.    Anthropometrics    Height: 5' 8" (172.7 cm) Height Method: Stated  Last Weight: 87.2 kg (192 lb 3.9 oz) (22 0000) Weight Method: Stated  BMI (Calculated): 29.2  BMI Classification: overweight (BMI 25-29.9)        Ideal Body Weight (IBW), Male: 154 lb     % Ideal Body Weight, Male (lb): 124.97 %                 Usual Body Weight (UBW), k.18 kg  % Usual Body Weight: 101.4     Usual Weight Provided By: patient    Wt Readings from Last 5 Encounters:   22 87.2 kg (192 lb 3.9 oz)   11/15/22 91.4 kg (201 lb 6.4 oz)   22 94.2 kg (207 " lb 9.6 oz)   07/15/22 88.7 kg (195 lb 9.6 oz)   05/23/22 93.3 kg (205 lb 11 oz)     Weight Change(s) Since Admission:  Admit Weight: 88.5 kg (195 lb) (11/10/22 1201)  87.2 kg (11/19/22)    Patient Education    Not applicable.    Monitoring & Evaluation     Dietitian will monitor food and beverage intake.  Nutrition Risk/Follow-Up: low (follow-up in 5-7 days)  Patients assigned 'low nutrition risk' status do not qualify for a full nutritional assessment but will be monitored and re-evaluated in a 5-7 day time period. Please consult if re-evaluation needed sooner.

## 2022-11-20 NOTE — PROGRESS NOTES
No acute events overnight. Successfully extubated 11/19/22.  Patient reports some bilateral lower back pain which radiates into his bilateral hips and thighs. Also reports some anterior incisional pain. Pain controlled with oral medications.  Has ambulated without difficulty.     Alert, oriented x4  Resting, sitting up in chair  [place] with good strength and ROM in lower extremities  Anterior and posterior incisions C/D/I    -S/p L5/S1 ALIF with Right MIS L5/S1 foraminotomy on 11/18/22  -Continue current pain control  -Continue PT and increasing activity   -Plan to downgrade to floor    Elza Ruby

## 2022-11-21 VITALS
TEMPERATURE: 98 F | WEIGHT: 192.25 LBS | DIASTOLIC BLOOD PRESSURE: 73 MMHG | RESPIRATION RATE: 18 BRPM | BODY MASS INDEX: 29.14 KG/M2 | HEIGHT: 68 IN | SYSTOLIC BLOOD PRESSURE: 122 MMHG | HEART RATE: 80 BPM | OXYGEN SATURATION: 92 %

## 2022-11-21 PROCEDURE — 97535 SELF CARE MNGMENT TRAINING: CPT | Mod: CO

## 2022-11-21 PROCEDURE — 27000221 HC OXYGEN, UP TO 24 HOURS

## 2022-11-21 PROCEDURE — 97116 GAIT TRAINING THERAPY: CPT | Mod: CQ

## 2022-11-21 PROCEDURE — 25000003 PHARM REV CODE 250: Performed by: NEUROLOGICAL SURGERY

## 2022-11-21 PROCEDURE — 25000003 PHARM REV CODE 250: Performed by: STUDENT IN AN ORGANIZED HEALTH CARE EDUCATION/TRAINING PROGRAM

## 2022-11-21 PROCEDURE — 94761 N-INVAS EAR/PLS OXIMETRY MLT: CPT

## 2022-11-21 RX ADMIN — MUPIROCIN: 20 OINTMENT TOPICAL at 08:11

## 2022-11-21 RX ADMIN — METHOCARBAMOL 750 MG: 750 TABLET ORAL at 08:11

## 2022-11-21 RX ADMIN — FUROSEMIDE 20 MG: 20 TABLET ORAL at 08:11

## 2022-11-21 RX ADMIN — FENOFIBRATE 145 MG: 145 TABLET, FILM COATED ORAL at 08:11

## 2022-11-21 RX ADMIN — FAMOTIDINE 20 MG: 10 INJECTION, SOLUTION INTRAVENOUS at 08:11

## 2022-11-21 RX ADMIN — METOPROLOL TARTRATE 25 MG: 25 TABLET, FILM COATED ORAL at 08:11

## 2022-11-21 RX ADMIN — ISOSORBIDE MONONITRATE 60 MG: 30 TABLET, EXTENDED RELEASE ORAL at 08:11

## 2022-11-21 RX ADMIN — CETIRIZINE HYDROCHLORIDE 10 MG: 10 TABLET, FILM COATED ORAL at 08:11

## 2022-11-21 RX ADMIN — TAMSULOSIN HYDROCHLORIDE 0.4 MG: 0.4 CAPSULE ORAL at 08:11

## 2022-11-21 RX ADMIN — RANOLAZINE 500 MG: 500 TABLET, EXTENDED RELEASE ORAL at 08:11

## 2022-11-21 RX ADMIN — HYDROCODONE BITARTRATE AND ACETAMINOPHEN 1 TABLET: 10; 325 TABLET ORAL at 06:11

## 2022-11-21 RX ADMIN — FLUTICASONE PROPIONATE 50 MCG: 50 SPRAY, METERED NASAL at 08:11

## 2022-11-21 RX ADMIN — ACETAMINOPHEN 650 MG: 325 TABLET ORAL at 12:11

## 2022-11-21 RX ADMIN — ATORVASTATIN CALCIUM 40 MG: 40 TABLET, FILM COATED ORAL at 08:11

## 2022-11-21 NOTE — PLAN OF CARE
DCP assessment completed. Patient to DC home today. In agreement with HH at discharge with no provider preference. Also noted rec for RW at DC, orders placed and referral sent to [facility] and [facility] for HH. Awaiting acceptance and delivery of walker to bedside prior to DC.      11/21/22 0848   Discharge Assessment   Assessment Type Discharge Planning Assessment   Confirmed/corrected address, phone number and insurance Yes   Confirmed Demographics Contacted registration to update  ([address])   Source of Information patient   Does patient/caregiver understand observation status Yes   Communicated SANNA with patient/caregiver Yes   Lives With spouse;child(bri), dependent;grandchild(bri)   Do you expect to return to your current living situation? Yes   Do you have help at home or someone to help you manage your care at home? Yes   Who are your caregiver(s) and their phone number(s)? wife Estehr; [phone]   Prior to hospitilization cognitive status: Alert/Oriented   Current cognitive status: Alert/Oriented   Walking or Climbing Stairs Difficulty ambulation difficulty, requires equipment   Dressing/Bathing Difficulty bathing difficulty, assistance 1 person   Home Accessibility wheelchair accessible   Home Layout Able to live on 1st floor   Equipment Currently Used at Home cane, straight;walker, standard   Patient currently being followed by outpatient case management? No   Do you currently have service(s) that help you manage your care at home? No   Do you have any problems affording any of your prescribed medications? No   How do you get to doctors appointments? car, drives self;family or friend will provide   Discharge Plan A Home with family   Discharge Plan B Home Health   DME Needed Upon Discharge  walker, rolling   Discharge Plan discussed with: Spouse/sig other;Patient     1100: [facility] has accepted for HH. Added to the AVS. S/w [facility] at [facility] and [facility] will be  dispatched to the hospital once  is available.     Ronak Chu, YAMILET Case Manager

## 2022-11-21 NOTE — NURSING
Discharge Note    Patient AAOx4, VS stable. Wife at bedside. Prescription for norco & robaxan handed to wife. Went through all discharge instructions including but not limited to: spondylolisthesis, spinal fusion, norco, methocarbamol, follow up appointments, home health, and when to call the health care provider. Patient and wife verbalized understanding. Removed both IV. Safe transfer to wheelchair. Wheeled down to wife's vehicle by unit secretary.     Walker delivered by [facility] before discharge.

## 2022-11-21 NOTE — PT/OT/SLP PROGRESS
"Physical Therapy Treatment    Patient Name:  Nikolas Gutierrez   MRN:  [id number]    Recommendations:     Discharge Recommendations:  rehabilitation facility, other (see comments), outpatient PT (pending progress)   Discharge Equipment Recommendations: walker, rolling   Barriers to discharge:  waiting on RW    Assessment:     Nikolas Gutierrez is a 72 y.o. male admitted with a medical diagnosis of Spondylolisthesis of lumbar region.  He presents with the following impairments/functional limitations:    .    Rehab Prognosis: Good; patient would benefit from acute skilled PT services to address these deficits and reach maximum level of function.    Recent Surgery: Procedure(s) (LRB):  FUSION, SPINE, LUMBAR, ALIF, USING COMPUTER-ASSISTED NAVIGATION (N/A) 3 Days Post-Op    Plan:     During this hospitalization, patient to be seen daily to address the identified rehab impairments via gait training, therapeutic activities, therapeutic exercises, neuromuscular re-education and progress toward the following goals:    Plan of Care Expires:  12/19/22    Subjective     Chief Complaint: Pt had no complaints upon arrival today.   Patient/Family Comments/goals: N/A  Pain/Comfort:         Objective:     Communicated with NSG prior to session.  Patient found up in chair with   upon PT entry to room.     General Precautions: Standard,     Orthopedic Precautions:spinal precautions   Braces:    Respiratory Status: Room air     Functional Mobility:  Transfers:     Sit to Stand:  minimum assistance with rolling walker  Gait: Pt amb ~400 ft with RW at CGA with good tolerance. Reported "bone to bone" feeling in his hip during GT but quickly recovered after standing rest break.       AM-PAC 6 CLICK MOBILITY          Treatment & Education:    Patient left up in chair with all lines intact and call button in reach..    GOALS:   Multidisciplinary Problems       Physical Therapy Goals          Problem: Physical Therapy    Goal Priority " Disciplines Outcome Goal Variances Interventions   Physical Therapy Goal     PT, PT/OT Ongoing, Progressing     Description: Goals to be met by: 12/19/22     Patient will increase functional independence with mobility by performing:    . Supine to sit with [place]  . Sit to supine with [place]  . Sit to stand transfer with [place]  . Gait  x 400 feet with Modified [place] using Rolling Walker.                          Time Tracking:     PT Received On:    PT Start Time: 0912     PT Stop Time: 0930  PT Total Time (min): 18 min     Billable Minutes: Gait Training 18    Treatment Type: Treatment  PT/PTA: PTA     PTA Visit Number: 1     11/21/2022

## 2022-11-21 NOTE — PT/OT/SLP PROGRESS
Occupational Therapy  Treatment    Nikolas Gutierrez   MRN: [id number]   Admitting Diagnosis: Spondylolisthesis of lumbar region    OT Date of Treatment: 11/21/22   OT Start Time: 0950  OT Stop Time: 1020  OT Total Time (min): 30 min     Billable Minutes:  Self Care/Home Management 30  Total Minutes: 30     OT/ONESIMO: ONESIMO     ONESIMO Visit Number: 1    General Precautions: Standard,    Orthopedic Precautions:    Braces:           Subjective:  Communicated with RN prior to session.    Patient found sitting in chair upon entry.    Objective:     Transfer Training:   Sit to stand:Stand-by Assistance with No Assistive Device from chair  Bed <> Chair:  Step Transfer with Stand-by Assistance with No Assistive Device from chair to wheelchair  Toilet Transfer:  Pt Step Transfer with Stand-by Assistance with Rolling Walker from chair to BR commode  Patient tub bench transfer Step Transfer with Contact Guard Assistance with No Assistive Device and patient ambulating from wheelchair to tub/shower combo using wall to brace and maintain balance. No LOB noted stepping into or out of tub. Patient educated on use of tub bench at home for restbreaks during showering activity and for safety.    UE Dressing:  Patient performed UE Dressing with Independent with no AE at bedside chair.    LE Dressing:  Patient performed don/doffed pants with Stand-by Assistance    Patient left  sitting on toilet for Bowel movement  with all lines intact, call button in reach, and RN notified    ASSESSMENT:  Nikolas Gutierrez is a 72 y.o. male with a medical diagnosis of Spondylolisthesis of lumbar region.    Rehab potential is excellent    Activity tolerance: Excellent    Discharge recommendations: home with home health     Equipment recommendations:       GOALS:   Multidisciplinary Problems       Occupational Therapy Goals          Problem: Occupational Therapy    Goal Priority Disciplines Outcome Interventions   Occupational Therapy Goal     OT, PT/OT  Ongoing, Progressing    Description: Goals to be met by: 12/5/2022     Patient will increase functional independence with ADLs by performing:    UE Dressing with Modified [place].  LE Dressing with Modified [place].  Grooming while standing at sink with Modified [place].  Toileting from toilet with Modified [place] for hygiene and clothing management.   Bathing from  standing with Modified [place].  Toilet transfer to toilet with Modified [place].                         Plan:  Patient to be seen 5 x/week, 6 x/week to address the above listed problems via self-care/home management, therapeutic activities, therapeutic exercises  Plan of Care expires: 12/05/22  Plan of Care reviewed with: patient, spouse         11/21/2022

## 2022-11-22 ENCOUNTER — PATIENT OUTREACH (OUTPATIENT)
Dept: ADMINISTRATIVE | Facility: CLINIC | Age: 72
End: 2022-11-22
Payer: COMMERCIAL

## 2022-11-22 NOTE — PROGRESS NOTES
C3 nurse spoke with Nikolas Gutierrez for a TCC post hospital discharge follow up call. The patient has a scheduled [facility] appointment with Viral ravi MD, neurosurgeon on 11/29/22 @ 2:00pm and Jennifer Quiros MD on 12/1/22 @ 2:40.

## 2022-11-22 NOTE — DISCHARGE SUMMARY
[facility] - 7th Floor ICU  Neurosurgery  Discharge Summary      Patient Name: Nikolas Gutierrez  MRN: [id number]  Admission Date: 11/18/2022  Hospital Length of Stay: 3 days  Discharge Date and Time: 11/21/2022 12:34 PM  Attending Physician: Devon Gonzalez MD  Discharging Provider: BRIDGER Lopez  Primary Care Provider: Jennifer Quiros MD     HPI: Mr. Gutierrez was evaluated outpatient for right sided lower back pain that had been progressively worsening. He failed conservative therapy. The patient underwent MRI which showed spondylolisthesis at L5-S1 and moderate neuroforaminal stenosis at right L5-S1. He was scheduled and consented for ALIF of L5-S1 and right microdiscectomy at L5/S1.     Procedure(s) (LRB):  FUSION, SPINE, LUMBAR, ALIF, USING COMPUTER-ASSISTED NAVIGATION (N/A)     Hospital Course: He underwent ALIF L5/S1 and posterior right L5/S1 MIS foraminotomy on 11/18/22. The patient was unable to be extubated following the procedure due to concern for aspiration pneumonia versus pulmonary edema. He was extubated the day following surgery with no complication. On post op day 2, the patient had some bilateral lower back pain that continued to radiate into hip and thighs, but his pain was controlled with PO medications. On post op day 3, he was ambulating, his pain was controlled, he was tolerating PO, and he had no complaints. He was discharged home on 11/21/22. He was given instructions for wound care and post op precautions. He was given a prescription for pain medication and muscle relaxer. He had post op follow up scheduled.     Consults:   Consults (From admission, onward)          Status Ordering Provider     Inpatient consult to Social Work/Case Management  Once        Provider:  (Not yet assigned)    Completed DEVON GONZALEZ     Inpatient consult to Registered Dietitian/Nutritionist  Once        Provider:  (Not yet assigned)    Completed DEVON GONZALEZ     Inpatient consult to Social Work   Once        Provider:  (Not yet assigned)    Completed DEVON LOPEZ               Pending Diagnostic Studies:       None          Final Active Diagnoses:    Diagnosis Date Noted POA    PRINCIPAL PROBLEM:  Spondylolisthesis of lumbar region [M43.16] 11/18/2022 Yes      Problems Resolved During this Admission:      Discharged Condition: stable    Disposition: Home or Self Care    Follow Up:   Follow-up Information       Devon Lopez MD Follow up on 11/29/2022.    Specialty: Neurosurgery  Why: For suture removal. @ 2:00pm  Contact information:  [address] [place] [id number] [phone]               [facility] Follow up.    Specialty: Home Health Services  Why: Contact the office if you have not been contacted within 48 hours of discharge.  Contact information:  [address]  [place] [address]  [phone]                           Patient Instructions:      Diet diabetic     Notify your health care provider if you experience any of the following:  temperature >100.4     Remove dressing in 48 hours     HOME HEALTH ORDERS   Order Comments: Home Health for SN/PT/OT.   DC home today.     Order Specific Question Answer Comments   What Home Health Agency is the patient currently using? Other/External      Activity as tolerated     Medications:  Reconciled Home Medications:      Medication List        CONTINUE taking these medications      atorvastatin 40 MG tablet  Commonly known as: LIPITOR  Take 40 mg by mouth once daily.     clonazePAM 1 MG tablet  Commonly known as: KlonoPIN  Take 1 tablet (1 mg total) by mouth 3 (three) times daily as needed for Anxiety.     clopidogreL 75 mg tablet  Commonly known as: PLAVIX  Take 75 mg by mouth Daily.     diclofenac 75 MG EC tablet  Commonly known as: VOLTAREN  Take 1 tablet (75 mg total) by mouth 2 (two) times daily.     fenofibrate 145 MG tablet  Commonly known as: TRICOR  Take 145 mg by mouth once daily.     fluorouraciL 5 %  cream  Commonly known as: EFUDEX  Apply topically.     fluticasone propionate 50 mcg/actuation nasal spray  Commonly known as: FLONASE  1 spray by Each Nostril route once daily.     furosemide 20 MG tablet  Commonly known as: LASIX  Take 20 mg by mouth once daily at 6am.     isosorbide mononitrate 60 MG 24 hr tablet  Commonly known as: IMDUR  Take 60 mg by mouth 2 (two) times daily.     loratadine 10 mg tablet  Commonly known as: CLARITIN  Take 10 mg by mouth once daily.     metoprolol tartrate 25 MG tablet  Commonly known as: LOPRESSOR  Take 1 tablet by mouth 2 (two) times a day.     nitroGLYCERIN 0.4 MG SL tablet  Commonly known as: NITROSTAT  SMARTSI Tablet(s) Sublingual PRN     ranolazine 500 MG Tb12  Commonly known as: RANEXA  Take 500 mg by mouth 2 (two) times daily.     sildenafiL 50 MG tablet  Commonly known as: VIAGRA  Take 1 tablet (50 mg total) by mouth daily as needed for Erectile Dysfunction.     tamsulosin 0.4 mg Cap  Commonly known as: FLOMAX  Take 1 capsule (0.4 mg total) by mouth once daily.     testosterone cypionate 200 mg/mL injection  Commonly known as: DEPOTESTOTERONE CYPIONATE  Inject 1 mL (200 mg total) into the muscle every 14 (fourteen) days.     XARELTO 2.5 mg Tab  Generic drug: rivaroxaban  Take 1 tablet by mouth 2 (two) times daily.              BRIDGER Lopez  Neurosurgery  [facility] - 7th Floor ICU

## 2022-12-01 ENCOUNTER — OFFICE VISIT (OUTPATIENT)
Dept: INTERNAL MEDICINE | Facility: CLINIC | Age: 72
End: 2022-12-01
Payer: COMMERCIAL

## 2022-12-01 DIAGNOSIS — I10 HYPERTENSION, UNSPECIFIED TYPE: Primary | ICD-10-CM

## 2022-12-01 NOTE — PROGRESS NOTES
Established Patient - Audio Only Telehealth Visit     The patient location is: [place]  The chief complaint leading to consultation is: f/u  Visit type: Virtual visit with audio only (telephone)  Total time spent with patient: 30 mins       The reason for the audio only service rather than synchronous audio and video virtual visit was related to technical difficulties or patient preference/necessity.     Each patient to whom I provide medical services by telemedicine is:  (1) informed of the relationship between the physician and patient and the respective role of any other health care provider with respect to management of the patient; and (2) notified that they may decline to receive medical services by telemedicine and may withdraw from such care at any time. Patient verbally consented to receive this service via voice-only telephone call.       HPI: This is a Telemed visit  Mr Gutierrez was originally cruzito for an inperson visit however he was not sure about the dates- hence this is now a virtual visit. Over the phone he stated he is doing well. He says that each day he is making progress- walikng around with a walker. The pain in his back is much improved.  He has been on pain meds- but decreasing amounts and having regular Bowel movts.  He has not had any chest pain and has been diligent with his meds.     Assessment and plan:    CAD        Cardiac Cath April 2022- medical mm advised        On Xarelto, ASA, Plavix, Metoprolol, Ranexa, Nitro (does not need it since   cath), Imdur,      2. Lumbar deg disc ds      Recovering well from spine surgery- fusion         4. Allergic Rhinitis      Stable on Claritin, Flonase     5. HTN      On Lisinopril, Lasix,      6. Hyperlipidemia      Atorvastatin                    This service was not originating from a related E/M service provided within the previous 7 days nor will  to an E/M service or procedure within the next 24 hours or my soonest available appointment.   Prevailing standard of care was able to be met in this audio-only visit.

## 2023-01-18 NOTE — PROGRESS NOTES
[facility] INTERNAL MEDICINE  OUTPATIENT OFFICE VISIT NOTE    SUBJECTIVE:   CC- followup and hip pain  HPI: Mr Gutierrez is here with pain in his hip. He is doing well from a Spine surgery standpoint- however he is complaining of ongoing right hip pain. It is mainly on the outside of the hip. He is ambulating well with a cane.  He is also requesting refills on his Klonopin and Plavix.    ROS:  CONSTITUTIONAL: Denies weight loss, fever and chills.  HEENT: Denies changes in vision and hearing.  ?RESPIRATORY: Denies SOB and cough.?  CV: Denies palpitations and CP. ?  GI: Denies abdominal pain, nausea, vomiting and diarrhea.?  : Denies dysuria and urinary frequency.?  MSK: Denies myalgia and joint pain.?  SKIN: Denies rash and pruritus.  ?NEUROLOGICAL: Denies headache and syncope.?  PSYCHIATRIC: Denies recent changes in mood. Denies anxiety and depression.     OBJECTIVE:     Physical Examination:    Vital signs:     Vitals:    01/19/23 1230   BP: 130/77   Pulse: 73   Resp: 18   Temp: 98.1 °F (36.7 °C)        General: Well nourished w/o distress  HEENT: NC/AT; PERRLA; nasal and oral mucosa moist and clear; no sinus tenderness; no thyromegaly  Neck: Full ROM; no lymphadenopathy  Pulm: CTA bilaterally, normal work of breathing  CV: S1, S2 w/o murmurs or gallops; no edema noted  GI: Soft with normal bowel sounds in all quadrants, no masses on palpation  MSK: Full ROM of all extremities and spine w/o limitation. He is significant sharp tenderness directly over the Right greater trochanter.  Derm: No rashes, abnormal bruising, or skin lesions  Neuro: AAOx4; CN II-XII intact; motor/sensory function intact  Psych: Cooperative; appropriate mood and affect       ASSESSMENT & PLAN:     CAD        Cardiac Cath April 2022- medical mm advised        On Xarelto, ASA, Plavix, Metoprolol, Ranexa, Nitro (does not need it since   cath), Imdur,      2. Lumbar deg disc ds      Recovering well from spine surgery- fusion      Continuing with PT          4. Allergic Rhinitis      Stable on Claritin, Flonase     5. HTN      On Lisinopril, Lasix,      6. Hyperlipidemia      Atorvastatin    7. Hip Pain- right- likely Troch bursitis      The area was cleaned with chlorhexidine. The area of max tenderness was identified and we Injected Kenalog 20 mg - 1 cc and Lidocaine 2% - 1 cc over the greater trochanter. The pt rayshawn the procedure well- there were no complications. The pt was able ambulate well after the injection.                       Follow up in about 3 months (around 4/19/2023).     Jennifer Quiros MD

## 2023-01-19 ENCOUNTER — OFFICE VISIT (OUTPATIENT)
Dept: INTERNAL MEDICINE | Facility: CLINIC | Age: 73
End: 2023-01-19
Payer: COMMERCIAL

## 2023-01-19 VITALS
HEART RATE: 73 BPM | HEIGHT: 68 IN | TEMPERATURE: 98 F | RESPIRATION RATE: 18 BRPM | DIASTOLIC BLOOD PRESSURE: 77 MMHG | WEIGHT: 177.5 LBS | SYSTOLIC BLOOD PRESSURE: 130 MMHG | BODY MASS INDEX: 26.9 KG/M2

## 2023-01-19 DIAGNOSIS — F41.9 ANXIETY: ICD-10-CM

## 2023-01-19 DIAGNOSIS — M25.559 HIP PAIN: Primary | ICD-10-CM

## 2023-01-19 DIAGNOSIS — I25.10 CORONARY ARTERY DISEASE, UNSPECIFIED VESSEL OR LESION TYPE, UNSPECIFIED WHETHER ANGINA PRESENT, UNSPECIFIED WHETHER NATIVE OR TRANSPLANTED HEART: ICD-10-CM

## 2023-01-19 PROCEDURE — 20610 DRAIN/INJ JOINT/BURSA W/O US: CPT | Mod: PBBFAC | Performed by: INTERNAL MEDICINE

## 2023-01-19 PROCEDURE — 99214 OFFICE O/P EST MOD 30 MIN: CPT | Mod: PBBFAC | Performed by: INTERNAL MEDICINE

## 2023-01-19 RX ORDER — LIDOCAINE HYDROCHLORIDE 20 MG/ML
1 INJECTION, SOLUTION EPIDURAL; INFILTRATION; INTRACAUDAL; PERINEURAL
Status: COMPLETED | OUTPATIENT
Start: 2023-01-19 | End: 2023-01-19

## 2023-01-19 RX ORDER — TRIAMCINOLONE ACETONIDE 40 MG/ML
40 INJECTION, SUSPENSION INTRA-ARTICULAR; INTRAMUSCULAR
Status: COMPLETED | OUTPATIENT
Start: 2023-01-19 | End: 2023-01-19

## 2023-01-19 RX ORDER — CLONAZEPAM 1 MG/1
1 TABLET ORAL 3 TIMES DAILY PRN
Qty: 90 TABLET | Refills: 0 | Status: SHIPPED | OUTPATIENT
Start: 2023-01-19 | End: 2023-02-28 | Stop reason: SDUPTHER

## 2023-01-19 RX ORDER — CLOPIDOGREL BISULFATE 75 MG/1
75 TABLET ORAL DAILY
Qty: 30 TABLET | Refills: 4 | Status: SHIPPED | OUTPATIENT
Start: 2023-01-19 | End: 2023-04-20 | Stop reason: SDUPTHER

## 2023-01-19 RX ADMIN — TRIAMCINOLONE ACETONIDE 40 MG: 40 INJECTION, SUSPENSION INTRA-ARTICULAR; INTRAMUSCULAR at 02:01

## 2023-01-19 RX ADMIN — LIDOCAINE HYDROCHLORIDE 20 MG: 20 INJECTION, SOLUTION EPIDURAL; INFILTRATION; INTRACAUDAL; PERINEURAL at 02:01

## 2023-02-28 DIAGNOSIS — F41.9 ANXIETY: ICD-10-CM

## 2023-02-28 RX ORDER — CLONAZEPAM 1 MG/1
1 TABLET ORAL 3 TIMES DAILY PRN
Qty: 90 TABLET | Refills: 0 | Status: SHIPPED | OUTPATIENT
Start: 2023-02-28 | End: 2023-04-20 | Stop reason: SDUPTHER

## 2023-04-18 DIAGNOSIS — E29.1 HYPOGONADISM IN MALE: Primary | ICD-10-CM

## 2023-04-20 ENCOUNTER — OFFICE VISIT (OUTPATIENT)
Dept: INTERNAL MEDICINE | Facility: CLINIC | Age: 73
End: 2023-04-20
Payer: COMMERCIAL

## 2023-04-20 VITALS
BODY MASS INDEX: 25.79 KG/M2 | RESPIRATION RATE: 18 BRPM | TEMPERATURE: 98 F | DIASTOLIC BLOOD PRESSURE: 80 MMHG | HEART RATE: 63 BPM | HEIGHT: 68 IN | WEIGHT: 170.19 LBS | SYSTOLIC BLOOD PRESSURE: 126 MMHG

## 2023-04-20 DIAGNOSIS — I25.10 CORONARY ARTERY DISEASE, UNSPECIFIED VESSEL OR LESION TYPE, UNSPECIFIED WHETHER ANGINA PRESENT, UNSPECIFIED WHETHER NATIVE OR TRANSPLANTED HEART: ICD-10-CM

## 2023-04-20 DIAGNOSIS — L98.9 SKIN LESION: ICD-10-CM

## 2023-04-20 DIAGNOSIS — M62.838 MUSCLE SPASM: Primary | ICD-10-CM

## 2023-04-20 DIAGNOSIS — F41.9 ANXIETY: ICD-10-CM

## 2023-04-20 DIAGNOSIS — R58 ECCHYMOSIS: ICD-10-CM

## 2023-04-20 PROCEDURE — 99215 OFFICE O/P EST HI 40 MIN: CPT | Mod: PBBFAC | Performed by: INTERNAL MEDICINE

## 2023-04-20 RX ORDER — TIZANIDINE 4 MG/1
4 TABLET ORAL EVERY 8 HOURS
Qty: 21 TABLET | Refills: 1 | Status: SHIPPED | OUTPATIENT
Start: 2023-04-20 | End: 2023-05-04

## 2023-04-20 RX ORDER — CLONAZEPAM 1 MG/1
1 TABLET ORAL 3 TIMES DAILY PRN
Qty: 90 TABLET | Refills: 0 | Status: SHIPPED | OUTPATIENT
Start: 2023-04-20 | End: 2023-06-02 | Stop reason: SDUPTHER

## 2023-04-20 RX ORDER — EZETIMIBE 10 MG/1
10 TABLET ORAL DAILY
COMMUNITY
Start: 2023-02-09

## 2023-04-20 RX ORDER — HYDROCODONE BITARTRATE AND ACETAMINOPHEN 5; 325 MG/1; MG/1
1 TABLET ORAL
Status: ON HOLD | COMMUNITY
Start: 2023-01-04 | End: 2023-07-01 | Stop reason: HOSPADM

## 2023-04-20 RX ORDER — FENOFIBRATE 145 MG/1
145 TABLET, FILM COATED ORAL DAILY
Qty: 30 TABLET | Refills: 6 | Status: SHIPPED | OUTPATIENT
Start: 2023-04-20 | End: 2024-01-25

## 2023-04-20 RX ORDER — METHOCARBAMOL 750 MG/1
750 TABLET, FILM COATED ORAL 3 TIMES DAILY
COMMUNITY
Start: 2023-04-13 | End: 2023-04-20

## 2023-04-20 RX ORDER — CLOPIDOGREL BISULFATE 75 MG/1
75 TABLET ORAL DAILY
Qty: 30 TABLET | Refills: 4 | Status: SHIPPED | OUTPATIENT
Start: 2023-04-20 | End: 2023-10-09 | Stop reason: SDUPTHER

## 2023-04-20 RX ORDER — TRAMADOL HYDROCHLORIDE 50 MG/1
50 TABLET ORAL
COMMUNITY
Start: 2023-01-23

## 2023-04-20 NOTE — PROGRESS NOTES
[facility] INTERNAL MEDICINE  OUTPATIENT OFFICE VISIT NOTE    SUBJECTIVE:   CC- F/U  HPI: Mr Gutierrez is here for follow-up.  He states he is recovering well from his back surgery.  He does admit to ongoing pain in his leg which he attributes to sciatica.  He has been testing his muscle lately and having cramps in both lower extremities  He was possibly diagnosed with actinic keratoses for which he had seen our Dermatology Department and would like to be checked again  He also needs some refills on his medications  The pain in his hip is improved however he is attributing that now for sciatica  He is also noticing some areas of bruising and ecchymosis in his arms and legs    ROS:  CONSTITUTIONAL: Denies weight loss, fever and chills.  HEENT: Denies changes in vision and hearing.  ?RESPIRATORY: Denies SOB and cough.?  CV: Denies palpitations and CP. ?  GI: Denies abdominal pain, nausea, vomiting and diarrhea.?  : Denies dysuria and urinary frequency.?  MSK: Denies myalgia and joint pain.?  SKIN: Denies rash and pruritus.  ?NEUROLOGICAL: Denies headache and syncope.?  PSYCHIATRIC: Denies recent changes in mood. Denies anxiety and depression.     OBJECTIVE:     Physical Examination:    Vital signs:     Vitals:    04/20/23 1237   BP: 126/80   Pulse: 63   Resp: 18   Temp: 97.7 °F (36.5 °C)        General: Well nourished w/o distress  HEENT: NC/AT; PERRLA; nasal and oral mucosa moist and clear; no sinus tenderness; no thyromegaly  Neck: Full ROM; no lymphadenopathy  Pulm: CTA bilaterally, normal work of breathing  CV: S1, S2 w/o murmurs or gallops; no edema noted  GI: Soft with normal bowel sounds in all quadrants, no masses on palpation  MSK: Full ROM of all extremities and spine w/o limitation or discomfort  Derm: No rashes, abnormal bruising, or skin lesions  Neuro: AAOx4; CN II-XII intact; motor/sensory function intact  Psych: Cooperative; appropriate mood and affect     ASSESSMENT & PLAN:     CAD        Cardiac Cath April  2022- medical mm advised        On Xarelto, ASA, Plavix, Metoprolol, Ranexa, Nitro (does not need it since   cath), Imdur,      2. Lumbar deg disc ds      Recovering well from spine surgery- fusion      Continuing with PT         4. Allergic Rhinitis      Stable on Claritin, Flonase     5. HTN      On Lisinopril, Lasix,      6. Hyperlipidemia      Atorvastatin, Finofibrate    7. Scalp lesions- ?actinic keratosis      F/u in Derm clinic    8. ?Sciatica       Xanaflex po              No follow-ups on file.     Jennifer Quiros MD

## 2023-04-28 ENCOUNTER — LAB VISIT (OUTPATIENT)
Dept: LAB | Facility: HOSPITAL | Age: 73
End: 2023-04-28
Attending: INTERNAL MEDICINE
Payer: COMMERCIAL

## 2023-04-28 DIAGNOSIS — I25.10 CORONARY ARTERY DISEASE, UNSPECIFIED VESSEL OR LESION TYPE, UNSPECIFIED WHETHER ANGINA PRESENT, UNSPECIFIED WHETHER NATIVE OR TRANSPLANTED HEART: ICD-10-CM

## 2023-04-28 DIAGNOSIS — R58 ECCHYMOSIS: ICD-10-CM

## 2023-04-28 DIAGNOSIS — E29.1 HYPOGONADISM IN MALE: ICD-10-CM

## 2023-04-28 DIAGNOSIS — E29.1 HYPOGONADISM IN MALE: Primary | ICD-10-CM

## 2023-04-28 LAB
ALBUMIN SERPL-MCNC: 3.7 G/DL (ref 3.4–4.8)
ALBUMIN/GLOB SERPL: 1.2 RATIO (ref 1.1–2)
ALP SERPL-CCNC: 75 UNIT/L (ref 40–150)
ALT SERPL-CCNC: 12 UNIT/L (ref 0–55)
AST SERPL-CCNC: 19 UNIT/L (ref 5–34)
BASOPHILS # BLD AUTO: 0.02 X10(3)/MCL (ref 0–0.2)
BASOPHILS NFR BLD AUTO: 0.3 %
BILIRUBIN DIRECT+TOT PNL SERPL-MCNC: 1.1 MG/DL
BUN SERPL-MCNC: 15.2 MG/DL (ref 8.4–25.7)
CALCIUM SERPL-MCNC: 9.6 MG/DL (ref 8.8–10)
CHLORIDE SERPL-SCNC: 106 MMOL/L (ref 98–107)
CHOLEST SERPL-MCNC: 157 MG/DL
CHOLEST/HDLC SERPL: 4 {RATIO} (ref 0–5)
CO2 SERPL-SCNC: 29 MMOL/L (ref 23–31)
CREAT SERPL-MCNC: 0.79 MG/DL (ref 0.73–1.18)
EOSINOPHIL # BLD AUTO: 0.04 X10(3)/MCL (ref 0–0.9)
EOSINOPHIL NFR BLD AUTO: 0.6 %
ERYTHROCYTE [DISTWIDTH] IN BLOOD BY AUTOMATED COUNT: 14.1 % (ref 11.5–17)
ESTRADIOL SERPL HS-MCNC: 90 PG/ML
GFR SERPLBLD CREATININE-BSD FMLA CKD-EPI: >60 MLS/MIN/1.73/M2
GLOBULIN SER-MCNC: 3.2 GM/DL (ref 2.4–3.5)
GLUCOSE SERPL-MCNC: 78 MG/DL (ref 82–115)
HCT VFR BLD AUTO: 54.2 % (ref 42–52)
HDLC SERPL-MCNC: 35 MG/DL (ref 35–60)
HGB BLD-MCNC: 17.6 G/DL (ref 14–18)
IMM GRANULOCYTES # BLD AUTO: 0.02 X10(3)/MCL (ref 0–0.04)
IMM GRANULOCYTES NFR BLD AUTO: 0.3 %
LDLC SERPL CALC-MCNC: 106 MG/DL (ref 50–140)
LYMPHOCYTES # BLD AUTO: 1.1 X10(3)/MCL (ref 0.6–4.6)
LYMPHOCYTES NFR BLD AUTO: 15.2 %
MCH RBC QN AUTO: 29.6 PG (ref 27–31)
MCHC RBC AUTO-ENTMCNC: 32.5 G/DL (ref 33–36)
MCV RBC AUTO: 91.1 FL (ref 80–94)
MONOCYTES # BLD AUTO: 0.48 X10(3)/MCL (ref 0.1–1.3)
MONOCYTES NFR BLD AUTO: 6.6 %
NEUTROPHILS # BLD AUTO: 5.6 X10(3)/MCL (ref 2.1–9.2)
NEUTROPHILS NFR BLD AUTO: 77 %
NRBC BLD AUTO-RTO: 0 %
PLATELET # BLD AUTO: 193 X10(3)/MCL (ref 130–400)
PMV BLD AUTO: 10.1 FL (ref 7.4–10.4)
POTASSIUM SERPL-SCNC: 4.9 MMOL/L (ref 3.5–5.1)
PROLACTIN LEVEL (OHS): 9.79 NG/ML (ref 3.46–19.4)
PROT SERPL-MCNC: 6.9 GM/DL (ref 5.8–7.6)
PSA SERPL-MCNC: 1.39 NG/ML
RBC # BLD AUTO: 5.95 X10(6)/MCL (ref 4.7–6.1)
SODIUM SERPL-SCNC: 141 MMOL/L (ref 136–145)
TESTOST SERPL-MCNC: 688.6 NG/DL (ref 220.91–715.81)
TRIGL SERPL-MCNC: 81 MG/DL (ref 34–140)
VLDLC SERPL CALC-MCNC: 16 MG/DL
WBC # SPEC AUTO: 7.3 X10(3)/MCL (ref 4.5–11.5)

## 2023-04-28 PROCEDURE — 80061 LIPID PANEL: CPT

## 2023-04-28 PROCEDURE — 80053 COMPREHEN METABOLIC PANEL: CPT

## 2023-04-28 PROCEDURE — 82670 ASSAY OF TOTAL ESTRADIOL: CPT

## 2023-04-28 PROCEDURE — 84153 ASSAY OF PSA TOTAL: CPT

## 2023-04-28 PROCEDURE — 36415 COLL VENOUS BLD VENIPUNCTURE: CPT

## 2023-04-28 PROCEDURE — 84146 ASSAY OF PROLACTIN: CPT

## 2023-04-28 PROCEDURE — 84403 ASSAY OF TOTAL TESTOSTERONE: CPT

## 2023-04-28 PROCEDURE — 85025 COMPLETE CBC W/AUTO DIFF WBC: CPT

## 2023-04-28 RX ORDER — CLOMIPHENE CITRATE 50 MG/1
50 TABLET ORAL DAILY
Qty: 90 TABLET | Refills: 3 | Status: SHIPPED | OUTPATIENT
Start: 2023-04-28 | End: 2023-05-28

## 2023-05-01 ENCOUNTER — TELEPHONE (OUTPATIENT)
Dept: INTERNAL MEDICINE | Facility: CLINIC | Age: 73
End: 2023-05-01
Payer: COMMERCIAL

## 2023-05-01 NOTE — TELEPHONE ENCOUNTER
----- Message from Jennifer Quiros MD sent at 5/1/2023 12:53 PM CDT -----  Reviewed all labs- will monitor glucose levels  Pl notify pt

## 2023-05-01 NOTE — TELEPHONE ENCOUNTER
Placed call to patient x2 attempts No answer. Unable to leave voicemail due to voicemail box full. Will attempt to contact at a later time.

## 2023-05-02 NOTE — TELEPHONE ENCOUNTER
Pt called, name and   verified. Pt results given and recommendation. Pt verbalized 100% understanding. No further questions and concerns noted. Call ended.

## 2023-05-02 NOTE — TELEPHONE ENCOUNTER
"Called patients cell phone x2. Unable to leave message, "voicemail is full". Called his work number and left a message with  for him to call  Internal medicine or stop by clinic for message from his doctor.  "

## 2023-06-02 DIAGNOSIS — F41.9 ANXIETY: ICD-10-CM

## 2023-06-02 NOTE — TELEPHONE ENCOUNTER
----- Message from Martha Pacheco sent at 6/2/2023  1:37 PM CDT -----           Provider: DORIS Quiros      Last Visit: 4/20/23       Next Visit: 10/26/23       Patient's Contact #: [phone]       Medication Name & Dose: fluorouraciL (EFUDEX) 5 % cream  sildenafiL (VIAGRA) 50 MG tablet  clonazePAM (KLONOPIN) 1 MG tablet       Preferred Pharmacy: [address]      Comment:   Patient states they are out of this medication and needs refills. Please advise.

## 2023-06-05 RX ORDER — SILDENAFIL 50 MG/1
50 TABLET, FILM COATED ORAL DAILY PRN
Qty: 30 TABLET | Refills: 11 | Status: SHIPPED | OUTPATIENT
Start: 2023-06-05 | End: 2023-08-29 | Stop reason: SDUPTHER

## 2023-06-05 RX ORDER — FLUOROURACIL 50 MG/G
CREAM TOPICAL 2 TIMES DAILY
Qty: 40 G | Refills: 2 | Status: SHIPPED | OUTPATIENT
Start: 2023-06-05 | End: 2023-07-11 | Stop reason: SDUPTHER

## 2023-06-05 RX ORDER — CLONAZEPAM 1 MG/1
1 TABLET ORAL 3 TIMES DAILY PRN
Qty: 90 TABLET | Refills: 0 | Status: SHIPPED | OUTPATIENT
Start: 2023-06-05 | End: 2023-07-13 | Stop reason: SDUPTHER

## 2023-06-12 DIAGNOSIS — E29.1 HYPOGONADISM IN MALE: Primary | ICD-10-CM

## 2023-06-12 RX ORDER — TESTOSTERONE CYPIONATE 200 MG/ML
200 INJECTION, SOLUTION INTRAMUSCULAR
Qty: 10 ML | Refills: 1 | Status: SHIPPED | OUTPATIENT
Start: 2023-06-12 | End: 2023-12-11

## 2023-06-12 RX ORDER — TESTOSTERONE CYPIONATE 200 MG/ML
200 INJECTION, SOLUTION INTRAMUSCULAR
Qty: 10 ML | Refills: 1 | Status: SHIPPED | OUTPATIENT
Start: 2023-06-12 | End: 2023-06-12

## 2023-06-21 ENCOUNTER — PATIENT MESSAGE (OUTPATIENT)
Dept: ADMINISTRATIVE | Facility: HOSPITAL | Age: 73
End: 2023-06-21
Payer: COMMERCIAL

## 2023-06-29 ENCOUNTER — HOSPITAL ENCOUNTER (INPATIENT)
Facility: HOSPITAL | Age: 73
LOS: 2 days | Discharge: HOME | DRG: 251 | End: 2023-07-01
Attending: STUDENT IN AN ORGANIZED HEALTH CARE EDUCATION/TRAINING PROGRAM | Admitting: INTERNAL MEDICINE
Payer: COMMERCIAL

## 2023-06-29 DIAGNOSIS — I25.10 CORONARY ARTERY DISEASE, UNSPECIFIED VESSEL OR LESION TYPE, UNSPECIFIED WHETHER ANGINA PRESENT, UNSPECIFIED WHETHER NATIVE OR TRANSPLANTED HEART: ICD-10-CM

## 2023-06-29 DIAGNOSIS — I20.0 UNSTABLE ANGINA: Primary | ICD-10-CM

## 2023-06-29 DIAGNOSIS — I25.10 CAD (CORONARY ARTERY DISEASE), NATIVE CORONARY ARTERY: ICD-10-CM

## 2023-06-29 DIAGNOSIS — R07.9 CHEST PAIN: ICD-10-CM

## 2023-06-29 DIAGNOSIS — R06.02 SHORTNESS OF BREATH: ICD-10-CM

## 2023-06-29 LAB
ALBUMIN SERPL-MCNC: 3.6 G/DL (ref 3.4–4.8)
ALBUMIN SERPL-MCNC: 4 G/DL (ref 3.4–4.8)
ALBUMIN/GLOB SERPL: 1.1 RATIO (ref 1.1–2)
ALBUMIN/GLOB SERPL: 1.3 RATIO (ref 1.1–2)
ALP SERPL-CCNC: 59 UNIT/L (ref 40–150)
ALP SERPL-CCNC: 64 UNIT/L (ref 40–150)
ALT SERPL-CCNC: 14 UNIT/L (ref 0–55)
ALT SERPL-CCNC: 15 UNIT/L (ref 0–55)
APTT PPP: 29.3 SECONDS (ref 23.2–33.7)
APTT PPP: 51.8 SECONDS (ref 23.2–33.7)
AST SERPL-CCNC: 22 UNIT/L (ref 5–34)
AST SERPL-CCNC: 25 UNIT/L (ref 5–34)
BASOPHILS # BLD AUTO: 0.02 X10(3)/MCL
BASOPHILS # BLD AUTO: 0.02 X10(3)/MCL
BASOPHILS NFR BLD AUTO: 0.3 %
BASOPHILS NFR BLD AUTO: 0.3 %
BILIRUBIN DIRECT+TOT PNL SERPL-MCNC: 0.9 MG/DL
BILIRUBIN DIRECT+TOT PNL SERPL-MCNC: 1.1 MG/DL
BNP BLD-MCNC: 59.5 PG/ML
BUN SERPL-MCNC: 14.7 MG/DL (ref 8.4–25.7)
BUN SERPL-MCNC: 15.9 MG/DL (ref 8.4–25.7)
CALCIUM SERPL-MCNC: 9 MG/DL (ref 8.8–10)
CALCIUM SERPL-MCNC: 9.8 MG/DL (ref 8.8–10)
CHLORIDE SERPL-SCNC: 105 MMOL/L (ref 98–107)
CHLORIDE SERPL-SCNC: 107 MMOL/L (ref 98–107)
CO2 SERPL-SCNC: 22 MMOL/L (ref 23–31)
CO2 SERPL-SCNC: 26 MMOL/L (ref 23–31)
CREAT SERPL-MCNC: 0.9 MG/DL (ref 0.73–1.18)
CREAT SERPL-MCNC: 1.1 MG/DL (ref 0.73–1.18)
EOSINOPHIL # BLD AUTO: 0 X10(3)/MCL (ref 0–0.9)
EOSINOPHIL # BLD AUTO: 0.01 X10(3)/MCL (ref 0–0.9)
EOSINOPHIL NFR BLD AUTO: 0 %
EOSINOPHIL NFR BLD AUTO: 0.1 %
ERYTHROCYTE [DISTWIDTH] IN BLOOD BY AUTOMATED COUNT: 15.4 % (ref 11.5–17)
ERYTHROCYTE [DISTWIDTH] IN BLOOD BY AUTOMATED COUNT: 16.2 % (ref 11.5–17)
GFR SERPLBLD CREATININE-BSD FMLA CKD-EPI: >60 MLS/MIN/1.73/M2
GFR SERPLBLD CREATININE-BSD FMLA CKD-EPI: >60 MLS/MIN/1.73/M2
GLOBULIN SER-MCNC: 2.8 GM/DL (ref 2.4–3.5)
GLOBULIN SER-MCNC: 3.7 GM/DL (ref 2.4–3.5)
GLUCOSE SERPL-MCNC: 83 MG/DL (ref 82–115)
GLUCOSE SERPL-MCNC: 85 MG/DL (ref 82–115)
HCT VFR BLD AUTO: 51.4 % (ref 42–52)
HCT VFR BLD AUTO: 56.3 % (ref 42–52)
HGB BLD-MCNC: 16.7 G/DL (ref 14–18)
HGB BLD-MCNC: 18.4 G/DL (ref 14–18)
IMM GRANULOCYTES # BLD AUTO: 0.02 X10(3)/MCL (ref 0–0.04)
IMM GRANULOCYTES # BLD AUTO: 0.03 X10(3)/MCL (ref 0–0.04)
IMM GRANULOCYTES NFR BLD AUTO: 0.3 %
IMM GRANULOCYTES NFR BLD AUTO: 0.4 %
INR BLD: 1.06 (ref 0–1.3)
LYMPHOCYTES # BLD AUTO: 0.93 X10(3)/MCL (ref 0.6–4.6)
LYMPHOCYTES # BLD AUTO: 1.05 X10(3)/MCL (ref 0.6–4.6)
LYMPHOCYTES NFR BLD AUTO: 13.2 %
LYMPHOCYTES NFR BLD AUTO: 13.3 %
MCH RBC QN AUTO: 29.5 PG (ref 27–31)
MCH RBC QN AUTO: 29.7 PG (ref 27–31)
MCHC RBC AUTO-ENTMCNC: 32.5 G/DL (ref 33–36)
MCHC RBC AUTO-ENTMCNC: 32.7 G/DL (ref 33–36)
MCV RBC AUTO: 90.2 FL (ref 80–94)
MCV RBC AUTO: 91.3 FL (ref 80–94)
MONOCYTES # BLD AUTO: 0.36 X10(3)/MCL (ref 0.1–1.3)
MONOCYTES # BLD AUTO: 0.37 X10(3)/MCL (ref 0.1–1.3)
MONOCYTES NFR BLD AUTO: 4.6 %
MONOCYTES NFR BLD AUTO: 5.2 %
NEUTROPHILS # BLD AUTO: 5.62 X10(3)/MCL (ref 2.1–9.2)
NEUTROPHILS # BLD AUTO: 6.51 X10(3)/MCL (ref 2.1–9.2)
NEUTROPHILS NFR BLD AUTO: 80.7 %
NEUTROPHILS NFR BLD AUTO: 81.6 %
NRBC BLD AUTO-RTO: 0 %
NRBC BLD AUTO-RTO: 0 %
PLATELET # BLD AUTO: 184 X10(3)/MCL (ref 130–400)
PLATELET # BLD AUTO: 221 X10(3)/MCL (ref 130–400)
PMV BLD AUTO: 10 FL (ref 7.4–10.4)
PMV BLD AUTO: 9.7 FL (ref 7.4–10.4)
POTASSIUM SERPL-SCNC: 4.3 MMOL/L (ref 3.5–5.1)
POTASSIUM SERPL-SCNC: 5 MMOL/L (ref 3.5–5.1)
PROT SERPL-MCNC: 6.4 GM/DL (ref 5.8–7.6)
PROT SERPL-MCNC: 7.7 GM/DL (ref 5.8–7.6)
PROTHROMBIN TIME: 13.7 SECONDS (ref 12.5–14.5)
RBC # BLD AUTO: 5.63 X10(6)/MCL (ref 4.7–6.1)
RBC # BLD AUTO: 6.24 X10(6)/MCL (ref 4.7–6.1)
SODIUM SERPL-SCNC: 138 MMOL/L (ref 136–145)
SODIUM SERPL-SCNC: 140 MMOL/L (ref 136–145)
TROPONIN I SERPL-MCNC: <0.01 NG/ML (ref 0–0.04)
TROPONIN I SERPL-MCNC: <0.01 NG/ML (ref 0–0.04)
WBC # SPEC AUTO: 6.97 X10(3)/MCL (ref 4.5–11.5)
WBC # SPEC AUTO: 7.97 X10(3)/MCL (ref 4.5–11.5)

## 2023-06-29 PROCEDURE — 84484 ASSAY OF TROPONIN QUANT: CPT | Performed by: STUDENT IN AN ORGANIZED HEALTH CARE EDUCATION/TRAINING PROGRAM

## 2023-06-29 PROCEDURE — 84484 ASSAY OF TROPONIN QUANT: CPT | Performed by: PHYSICIAN ASSISTANT

## 2023-06-29 PROCEDURE — 83880 ASSAY OF NATRIURETIC PEPTIDE: CPT | Performed by: PHYSICIAN ASSISTANT

## 2023-06-29 PROCEDURE — 85025 COMPLETE CBC W/AUTO DIFF WBC: CPT | Performed by: PHYSICIAN ASSISTANT

## 2023-06-29 PROCEDURE — 63600175 PHARM REV CODE 636 W HCPCS: Performed by: STUDENT IN AN ORGANIZED HEALTH CARE EDUCATION/TRAINING PROGRAM

## 2023-06-29 PROCEDURE — 85730 THROMBOPLASTIN TIME PARTIAL: CPT | Performed by: PHYSICIAN ASSISTANT

## 2023-06-29 PROCEDURE — 80053 COMPREHEN METABOLIC PANEL: CPT | Performed by: STUDENT IN AN ORGANIZED HEALTH CARE EDUCATION/TRAINING PROGRAM

## 2023-06-29 PROCEDURE — 93005 ELECTROCARDIOGRAM TRACING: CPT

## 2023-06-29 PROCEDURE — 25000003 PHARM REV CODE 250: Performed by: STUDENT IN AN ORGANIZED HEALTH CARE EDUCATION/TRAINING PROGRAM

## 2023-06-29 PROCEDURE — 11000001 HC ACUTE MED/SURG PRIVATE ROOM

## 2023-06-29 PROCEDURE — 21400001 HC TELEMETRY ROOM

## 2023-06-29 PROCEDURE — 85025 COMPLETE CBC W/AUTO DIFF WBC: CPT | Performed by: STUDENT IN AN ORGANIZED HEALTH CARE EDUCATION/TRAINING PROGRAM

## 2023-06-29 PROCEDURE — 85730 THROMBOPLASTIN TIME PARTIAL: CPT | Performed by: INTERNAL MEDICINE

## 2023-06-29 PROCEDURE — 80053 COMPREHEN METABOLIC PANEL: CPT | Performed by: PHYSICIAN ASSISTANT

## 2023-06-29 PROCEDURE — 85610 PROTHROMBIN TIME: CPT | Performed by: PHYSICIAN ASSISTANT

## 2023-06-29 PROCEDURE — 99285 EMERGENCY DEPT VISIT HI MDM: CPT | Mod: 25

## 2023-06-29 RX ORDER — ACETAMINOPHEN 325 MG/1
650 TABLET ORAL EVERY 8 HOURS PRN
Status: DISCONTINUED | OUTPATIENT
Start: 2023-06-29 | End: 2023-07-01 | Stop reason: HOSPADM

## 2023-06-29 RX ORDER — CLOPIDOGREL BISULFATE 75 MG/1
75 TABLET ORAL DAILY
Status: DISCONTINUED | OUTPATIENT
Start: 2023-06-29 | End: 2023-07-01 | Stop reason: HOSPADM

## 2023-06-29 RX ORDER — SODIUM CHLORIDE 0.9 % (FLUSH) 0.9 %
10 SYRINGE (ML) INJECTION
Status: DISCONTINUED | OUTPATIENT
Start: 2023-06-29 | End: 2023-07-01 | Stop reason: HOSPADM

## 2023-06-29 RX ORDER — TALC
6 POWDER (GRAM) TOPICAL NIGHTLY PRN
Status: DISCONTINUED | OUTPATIENT
Start: 2023-06-29 | End: 2023-07-01 | Stop reason: HOSPADM

## 2023-06-29 RX ORDER — ONDANSETRON 2 MG/ML
4 INJECTION INTRAMUSCULAR; INTRAVENOUS EVERY 8 HOURS PRN
Status: DISCONTINUED | OUTPATIENT
Start: 2023-06-29 | End: 2023-07-01 | Stop reason: HOSPADM

## 2023-06-29 RX ORDER — ASPIRIN 325 MG
325 TABLET ORAL
Status: COMPLETED | OUTPATIENT
Start: 2023-06-29 | End: 2023-06-29

## 2023-06-29 RX ORDER — HEPARIN SODIUM,PORCINE/D5W 25000/250
0-40 INTRAVENOUS SOLUTION INTRAVENOUS CONTINUOUS
Status: DISCONTINUED | OUTPATIENT
Start: 2023-06-29 | End: 2023-07-01 | Stop reason: HOSPADM

## 2023-06-29 RX ORDER — NITROGLYCERIN 0.4 MG/1
0.4 TABLET SUBLINGUAL EVERY 5 MIN PRN
Status: DISCONTINUED | OUTPATIENT
Start: 2023-06-29 | End: 2023-07-01 | Stop reason: HOSPADM

## 2023-06-29 RX ADMIN — CLOPIDOGREL BISULFATE 75 MG: 75 TABLET ORAL at 12:06

## 2023-06-29 RX ADMIN — HEPARIN SODIUM 12 UNITS/KG/HR: 10000 INJECTION, SOLUTION INTRAVENOUS at 01:06

## 2023-06-29 RX ADMIN — ASPIRIN 325 MG ORAL TABLET 325 MG: 325 PILL ORAL at 12:06

## 2023-06-29 NOTE — Clinical Note
The catheter was inserted into the and was inserted over the wire into the ostium   right coronary artery. An angiography was performed of the right coronary arteries. Multiple views were taken. The angiography was performed via power injection.  [facility]

## 2023-06-29 NOTE — Clinical Note
The catheter was inserted into the, was removed from the and was inserted over the wire into the ostium   left main.

## 2023-06-29 NOTE — Clinical Note
The catheter was removed from the and was repositioned into the ostial LIMA graft. An angiography was performed of the graft. Multiple views were taken. The angiography was performed via power injection.  [facility]

## 2023-06-29 NOTE — Clinical Note
The catheter was inserted into the, was removed from the and was inserted over the wire into the left ventricle. Hemodynamics were performed.  and Pullback was recorded.  An angiography was performed of the LV. The angiography was performed via power injection.  [facility]

## 2023-06-29 NOTE — ED PROVIDER NOTES
Encounter Date: 6/29/2023    SCRIBE #1 NOTE: I, Elizabet Patel, am scribing for, and in the presence of,  Sidney Morrison IV, MD. I have scribed the following portions of the note - the EKG reading. Other sections scribed: HPI, ROS, PE.     History     Chief Complaint   Patient presents with    medical problem     Pt presents to er states was sent by [facility] to be further evaluated.  [facility] had PET scan done today states scan showed stress on the Heart.  [facility] has had two open heart surgeries in the past.  Pt is aaox4 ambulated into room with steady gait     72 year old male with a history of CAD s/p CABG x 2 with 12 stents, presents to ED, from his cardiologist office.  Pt says that he was having a PeT scan this morning and his doctor told him to come to the ED because his heard was stressed.  Pt is complaining of SOB at rest and worse with exertion, and fatigue that began 4 weeks ago.  He denies any chest pain.    Per chart review: Pt has a history of CAD s/p CABG x 2 with 12 stents.      Review of patient's allergies indicates:   Allergen Reactions    Tomato Blisters     Past Medical History:   Diagnosis Date    Actinic keratoses     Acute non-ST elevation myocardial infarction (NSTEMI) 03/2019    Basal cell carcinoma (BCC) of skin of lower extremity including hip 08/14/2015    R mid-distal shin, failed therapy w/ Aldera cream, s/p electrodessication & curettage 12/2015    CAD, multiple vessel     Calculus of kidney 05/23/2022    s/p lithotripsy years ago    Empyema of left pleural space     History of excision of pilonidal cyst     Other seasonal allergic rhinitis     Severe acute respiratory syndrome coronavirus 2 (SARS-CoV-2) vaccination not indicated 05/23/2022    Problem added via discern rule [id number]_pos_neg    Simple hepatic cyst     R dome    Skin cancer      Past Surgical History:   Procedure Laterality Date    angiogram with 12 stents      ANTERIOR LUMBAR INTERBODY FUSION (ALIF) USING COMPUTER-ASSISTED  NAVIGATION N/A 11/18/2022    Procedure: FUSION, SPINE, LUMBAR, ALIF, USING COMPUTER-ASSISTED NAVIGATION;  Surgeon: Viral Gonzalez MD;  Location: [facility];  Service: Neurosurgery;  Laterality: N/A;  ANTERIOR LUMBAR INTERBODY FUSION L5/S1  // RIGHT L5/S1 FORAMINOTOMIES // DRILL // SCOPE // SUPINE // SKYTRON //  LUKE TO OPEN // DIRECT SPINE // NDM    cabgx2      cabgx4      LITHOTRIPSY      pilonidial cyst excision      TOTAL HIP ARTHROPLASTY Right 05/13/2014    Secondary to avascular necrosis of femoral head     No family history on file.  Social History     Tobacco Use    Smoking status: Former     Types: Cigarettes    Smokeless tobacco: Never   Substance Use Topics    Alcohol use: Not Currently    Drug use: Never     Review of Systems   Constitutional:  Positive for fatigue. Negative for chills and fever.   HENT:  Negative for congestion, rhinorrhea and sore throat.    Eyes:  Negative for visual disturbance.   Respiratory:  Positive for shortness of breath. Negative for cough.    Cardiovascular:  Negative for chest pain.   Gastrointestinal:  Negative for abdominal pain, nausea and vomiting.   Genitourinary:  Negative for dysuria and hematuria.   Musculoskeletal:  Negative for joint swelling.   Skin:  Negative for rash.   Neurological:  Negative for weakness.   Psychiatric/Behavioral:  Negative for confusion.    All other systems reviewed and are negative.    Physical Exam     Initial Vitals [06/29/23 1126]   BP Pulse Resp Temp SpO2   114/72 98 20 97.9 °F (36.6 °C) 99 %      MAP       --         Physical Exam    Nursing note and vitals reviewed.  Constitutional: He is not diaphoretic. No distress.   Cardiovascular:  Normal rate and regular rhythm.           Pulmonary/Chest: No respiratory distress.   Well healed midline sternotomy scar   Abdominal: Abdomen is soft. He exhibits no distension. There is no abdominal tenderness.     Neurological: He is alert.   Psychiatric: He has a normal mood and affect.       ED Course    Critical Care    Date/Time: 6/29/2023 12:12 PM  Performed by: Sidney Morrison IV, MD  Authorized by: Sidney Morrison IV, MD   Total critical care time (exclusive of procedural time) : 35 minutes  Critical care time was exclusive of separately billable procedures and treating other patients.  Critical care was necessary to treat or prevent imminent or life-threatening deterioration of the following conditions: cardiac failure and circulatory failure.  Critical care was time spent personally by me on the following activities: development of treatment plan with patient or surrogate, discussions with consultants, interpretation of cardiac output measurements, evaluation of patient's response to treatment, examination of patient, obtaining history from patient or surrogate, ordering and performing treatments and interventions, ordering and review of laboratory studies, ordering and review of radiographic studies, pulse oximetry, re-evaluation of patient's condition and review of old charts.      Labs Reviewed   COMPREHENSIVE METABOLIC PANEL - Abnormal; Notable for the following components:       Result Value    Protein Total 7.7 (*)     Globulin 3.7 (*)     All other components within normal limits   CBC WITH DIFFERENTIAL - Abnormal; Notable for the following components:    RBC 6.24 (*)     Hgb 18.4 (*)     Hct 56.3 (*)     MCHC 32.7 (*)     All other components within normal limits   TROPONIN I - Normal   B-TYPE NATRIURETIC PEPTIDE - Normal   CBC W/ AUTO DIFFERENTIAL    Narrative:     The following orders were created for panel order CBC auto differential.  Procedure                               Abnormality         Status                     ---------                               -----------         ------                     CBC with Differential[[id number]]        Abnormal            Final result                 Please view results for these tests on the individual orders.   APTT   PROTIME-INR   CBC W/ AUTO  DIFFERENTIAL    Narrative:     The following orders were created for panel order CBC auto differential.  Procedure                               Abnormality         Status                     ---------                               -----------         ------                     CBC with Differential[[id number]]                                                         Please view results for these tests on the individual orders.   CBC WITH DIFFERENTIAL   COMPREHENSIVE METABOLIC PANEL   CBC W/ AUTO DIFFERENTIAL    Narrative:     The following orders were created for panel order CBC auto differential.  Procedure                               Abnormality         Status                     ---------                               -----------         ------                     CBC with Differential[[id number]]                                                         Please view results for these tests on the individual orders.   TROPONIN I   CBC WITH DIFFERENTIAL     EKG Readings: (Independently Interpreted)   Initial Reading: No STEMI. Rhythm: Normal Sinus Rhythm. Heart Rate: 87. Conduction: Normal. Axis: Normal. Other Impression: LVH with repolarization abnormalities; T wave inversion in inferior leads   Time: 1125     Imaging Results              X-Ray Chest PA And Lateral (Final result)  Result time 06/29/23 11:59:02      Final result by Larry Gregg MD (06/29/23 11:59:02)                   Impression:      No acute cardiopulmonary abnormality.      Electronically signed by: Larry Gregg MD  Date:    06/29/2023  Time:    11:59               Narrative:    EXAMINATION:  Two view chest radiograph.    CLINICAL HISTORY:  Shortness of breath    TECHNIQUE:  2 view of the chest.    COMPARISON:  Chest radiograph 11/19/2022.    FINDINGS:  The lungs are clear without consolidation or effusion.  There is scarring in the left lung base.  There is no pneumothorax.  The cardiac silhouette is normal in size.  There is no acute osseous  abnormality.                                       Medications   clopidogreL tablet 75 mg (75 mg Oral Given 6/29/23 1241)   heparin 25,000 units in dextrose 5% 250 mL (100 units/mL) infusion LOW INTENSITY nomogram - LAF (has no administration in time range)   heparin 25,000 units in dextrose 5% (100 units/ml) IV bolus from bag - ADDITIONAL PRN BOLUS - 60 units/kg (max bolus 4000 units) (has no administration in time range)   heparin 25,000 units in dextrose 5% (100 units/ml) IV bolus from bag - ADDITIONAL PRN BOLUS - 30 units/kg (max bolus 4000 units) (has no administration in time range)   sodium chloride 0.9% flush 10 mL (has no administration in time range)   nitroGLYCERIN SL tablet 0.4 mg (has no administration in time range)   sodium chloride 0.9% flush 10 mL (has no administration in time range)   melatonin tablet 6 mg (has no administration in time range)   ondansetron injection 4 mg (has no administration in time range)   acetaminophen tablet 650 mg (has no administration in time range)   aspirin tablet 325 mg (325 mg Oral Given 6/29/23 1240)   heparin 25,000 units in dextrose 5% (100 units/ml) IV bolus from bag INITIAL BOLUS (max bolus 4000 units) (4,000 Units Intravenous Bolus from Bag 6/29/23 1241)     Medical Decision Making  Problems Addressed:  Coronary artery disease, unspecified vessel or lesion type, unspecified whether angina present, unspecified whether native or transplanted heart: chronic illness or injury with exacerbation, progression, or side effects of treatment  Shortness of breath: acute illness or injury that poses a threat to life or bodily functions  Unstable angina: acute illness or injury that poses a threat to life or bodily functions      ED assessment:    72-year-old with a significant CAD history presenting for worsening dyspnea on exertion now having dyspnea at rest  Told by [facility] to come in for procedure after abnormal PET today  Discussed with [facility] they recommend aspirin Plavix  heparin and admission    Differential diagnosis (including but not limited to):   Unstable angina stable angina NSTEMI STEMI    ED management:   Heparin bolus and drip  Aspirin  Plavix  Admission    My independent radiology interpretation: CXR - no obvious infiltrates, consolidations, pleural effusions, or pneumothorax.        Amount and/or Complexity of Data Reviewed  Independent historian: none  External data reviewed: notes from previous admissions  Summary of data reviewed: Per chart review: Pt has a history of CAD s/p CABG x 2 with 12 stents.  Risk and benefits of testing: discussed   Labs: ordered and reviewed  Radiology: ordered and independent interpretation performed (see above or ED course)  ECG/medicine tests: ordered and independent interpretation performed (see above or ED course)  Discussion of management or test interpretation with external provider(s): discussed with [facility] consultant   Summary of discussion: admit, see ED course for medication recommendations     Risk  Drug therapy requiring intense monitoring for toxicity   Decision regarding hospitalization    Critical Care  30-74 minutes     ISidney MD personally performed the history, PE, MDM, and procedures as documented above and agree with the scribe's documentation.             Scribe Attestation:   Scribe #1: I performed the above scribed service and the documentation accurately describes the services I performed. I attest to the accuracy of the note.    Attending Attestation:           Physician Attestation for Scribe:  Physician Attestation Statement for Scribe #1: I, Sidney Morrison IV, MD, reviewed documentation, as scribed by Elizabet Patel in my presence, and it is both accurate and complete.           ED Course as of 06/29/23 1249   Thu Jun 29, 2023   1226 Lind with [facility] recommends heparin bolus and drip aspirin Plavix load admission to them NPO after midnight [AC]      ED Course User Index  [AC] Sidney Morrison IV, MD                  Clinical Impression:   Final diagnoses:  [R06.02] Shortness of breath  [R07.9] Chest pain  [I25.10] Coronary artery disease, unspecified vessel or lesion type, unspecified whether angina present, unspecified whether native or transplanted heart  [I20.0] Unstable angina (Primary)        ED Disposition Condition    Admit Stable                Sidney Morrison IV, MD  06/29/23 [id number]

## 2023-06-29 NOTE — Clinical Note
The catheter was repositioned into the ostial SVG graft. An angiography was performed of the graft. Multiple views were taken. The angiography was performed via power injection.  [facility]

## 2023-06-29 NOTE — FIRST PROVIDER EVALUATION
"Medical screening examination initiated.  I have conducted a focused provider triage encounter, findings are as follows:    Chief Complaint   Patient presents with    medical problem     Pt presents to er states was sent by [facility] to be further evaluated.  [facility] had PET scan done today states scan showed stress on the Heart.  [facility] has had two open heart surgeries in the past.  Pt is aaox4 ambulated into room with steady gait     Brief history of present illness:  72 y.o. male presents to the ED with worsening SOB. [facility] he was sent here by [facility] for further evaluation due to strain on heart. Takes blood thinners. Hx of CAD with CABG    Vitals:    06/29/23 1126 06/29/23 1127   BP: 114/72    Pulse: 98    Resp: 20    Temp: 97.9 °F (36.6 °C)    SpO2: 99%    Weight:  72.6 kg (160 lb)   Height:  5' 8" (1.727 m)       Pertinent physical exam:  Awake, alert, ambulatory, non-labored respirations    Brief workup plan:  labs, EKG, CXR    Preliminary workup initiated; this workup will be continued and followed by the physician or advanced practice provider that is assigned to the patient when roomed.  "

## 2023-06-29 NOTE — Clinical Note
Diagnosis: Shortness of breath [786.05.ICD-9-CM]   Admitting Provider:: CHERYL MICHAUD [[id number]]   Future Attending Provider: CHERYL MICHAUD [[id number]]   Reason for IP Medical Treatment  (Clinical interventions that can only be accomplished in the IP setting? ) :: heparin, cath tommorrow   I certify that Inpatient services for greater than or equal to 2 midnights are medically necessary:: Yes   Plans for Post-Acute care--if anticipated (pick the single best option):: A. No post acute care anticipated at this time   Special Needs:: No Special Needs [1]

## 2023-06-30 PROBLEM — I20.0 UNSTABLE ANGINA: Status: ACTIVE | Noted: 2023-06-30

## 2023-06-30 LAB
APTT PPP: 65.3 SECONDS (ref 23.2–33.7)
APTT PPP: 79.4 SECONDS (ref 23.2–33.7)
BASOPHILS # BLD AUTO: 0.02 X10(3)/MCL
BASOPHILS NFR BLD AUTO: 0.3 %
CATH EF QUANTITATIVE: 50 %
EOSINOPHIL # BLD AUTO: 0.03 X10(3)/MCL (ref 0–0.9)
EOSINOPHIL NFR BLD AUTO: 0.5 %
ERYTHROCYTE [DISTWIDTH] IN BLOOD BY AUTOMATED COUNT: 15.6 % (ref 11.5–17)
HCT VFR BLD AUTO: 49.7 % (ref 42–52)
HGB BLD-MCNC: 16.3 G/DL (ref 14–18)
IMM GRANULOCYTES # BLD AUTO: 0.03 X10(3)/MCL (ref 0–0.04)
IMM GRANULOCYTES NFR BLD AUTO: 0.5 %
LYMPHOCYTES # BLD AUTO: 1.49 X10(3)/MCL (ref 0.6–4.6)
LYMPHOCYTES NFR BLD AUTO: 22.5 %
MCH RBC QN AUTO: 29.5 PG (ref 27–31)
MCHC RBC AUTO-ENTMCNC: 32.8 G/DL (ref 33–36)
MCV RBC AUTO: 90 FL (ref 80–94)
MONOCYTES # BLD AUTO: 0.44 X10(3)/MCL (ref 0.1–1.3)
MONOCYTES NFR BLD AUTO: 6.7 %
NEUTROPHILS # BLD AUTO: 4.6 X10(3)/MCL (ref 2.1–9.2)
NEUTROPHILS NFR BLD AUTO: 69.5 %
NRBC BLD AUTO-RTO: 0 %
PLATELET # BLD AUTO: 195 X10(3)/MCL (ref 130–400)
PMV BLD AUTO: 10.1 FL (ref 7.4–10.4)
RBC # BLD AUTO: 5.52 X10(6)/MCL (ref 4.7–6.1)
WBC # SPEC AUTO: 6.61 X10(3)/MCL (ref 4.5–11.5)

## 2023-06-30 PROCEDURE — 63600175 PHARM REV CODE 636 W HCPCS: Performed by: STUDENT IN AN ORGANIZED HEALTH CARE EDUCATION/TRAINING PROGRAM

## 2023-06-30 PROCEDURE — C1894 INTRO/SHEATH, NON-LASER: HCPCS | Performed by: INTERNAL MEDICINE

## 2023-06-30 PROCEDURE — 85730 THROMBOPLASTIN TIME PARTIAL: CPT | Performed by: INTERNAL MEDICINE

## 2023-06-30 PROCEDURE — 25000003 PHARM REV CODE 250: Performed by: INTERNAL MEDICINE

## 2023-06-30 PROCEDURE — 99153 MOD SED SAME PHYS/QHP EA: CPT | Performed by: INTERNAL MEDICINE

## 2023-06-30 PROCEDURE — 27201423 OPTIME MED/SURG SUP & DEVICES STERILE SUPPLY: Performed by: INTERNAL MEDICINE

## 2023-06-30 PROCEDURE — 25000003 PHARM REV CODE 250: Performed by: STUDENT IN AN ORGANIZED HEALTH CARE EDUCATION/TRAINING PROGRAM

## 2023-06-30 PROCEDURE — 21400001 HC TELEMETRY ROOM

## 2023-06-30 PROCEDURE — 25500020 PHARM REV CODE 255: Performed by: INTERNAL MEDICINE

## 2023-06-30 PROCEDURE — C1887 CATHETER, GUIDING: HCPCS | Performed by: INTERNAL MEDICINE

## 2023-06-30 PROCEDURE — 85025 COMPLETE CBC W/AUTO DIFF WBC: CPT | Performed by: STUDENT IN AN ORGANIZED HEALTH CARE EDUCATION/TRAINING PROGRAM

## 2023-06-30 PROCEDURE — 99152 MOD SED SAME PHYS/QHP 5/>YRS: CPT | Performed by: INTERNAL MEDICINE

## 2023-06-30 PROCEDURE — 25000003 PHARM REV CODE 250: Performed by: NURSE PRACTITIONER

## 2023-06-30 PROCEDURE — C1725 CATH, TRANSLUMIN NON-LASER: HCPCS | Performed by: INTERNAL MEDICINE

## 2023-06-30 PROCEDURE — C1769 GUIDE WIRE: HCPCS | Performed by: INTERNAL MEDICINE

## 2023-06-30 PROCEDURE — 63600175 PHARM REV CODE 636 W HCPCS: Performed by: INTERNAL MEDICINE

## 2023-06-30 PROCEDURE — 92924 PRQ TRLUML C ATHRC 1 ART&/BR: CPT | Mod: RC | Performed by: INTERNAL MEDICINE

## 2023-06-30 PROCEDURE — C1885 CATH, TRANSLUMIN ANGIO LASER: HCPCS | Performed by: INTERNAL MEDICINE

## 2023-06-30 PROCEDURE — 93459 L HRT ART/GRFT ANGIO: CPT | Mod: XU | Performed by: INTERNAL MEDICINE

## 2023-06-30 RX ORDER — RANOLAZINE 500 MG/1
500 TABLET, EXTENDED RELEASE ORAL 2 TIMES DAILY
Status: DISCONTINUED | OUTPATIENT
Start: 2023-06-30 | End: 2023-07-01 | Stop reason: HOSPADM

## 2023-06-30 RX ORDER — ASPIRIN 325 MG
TABLET ORAL
Status: DISCONTINUED | OUTPATIENT
Start: 2023-06-30 | End: 2023-07-01 | Stop reason: HOSPADM

## 2023-06-30 RX ORDER — SODIUM CHLORIDE 9 MG/ML
INJECTION, SOLUTION INTRAVENOUS CONTINUOUS
Status: DISCONTINUED | OUTPATIENT
Start: 2023-06-30 | End: 2023-07-01 | Stop reason: HOSPADM

## 2023-06-30 RX ORDER — LIDOCAINE HYDROCHLORIDE 10 MG/ML
INJECTION, SOLUTION EPIDURAL; INFILTRATION; INTRACAUDAL; PERINEURAL
Status: DISCONTINUED | OUTPATIENT
Start: 2023-06-30 | End: 2023-07-01 | Stop reason: HOSPADM

## 2023-06-30 RX ORDER — NITROGLYCERIN 0.4 MG/1
0.4 TABLET SUBLINGUAL
Status: DISCONTINUED | OUTPATIENT
Start: 2023-06-30 | End: 2023-06-30

## 2023-06-30 RX ORDER — METOPROLOL TARTRATE 25 MG/1
25 TABLET, FILM COATED ORAL 2 TIMES DAILY
Status: DISCONTINUED | OUTPATIENT
Start: 2023-06-30 | End: 2023-07-01 | Stop reason: HOSPADM

## 2023-06-30 RX ORDER — FENOFIBRATE 145 MG/1
145 TABLET, FILM COATED ORAL DAILY
Status: DISCONTINUED | OUTPATIENT
Start: 2023-06-30 | End: 2023-07-01 | Stop reason: HOSPADM

## 2023-06-30 RX ORDER — CEFAZOLIN SODIUM 1 G/3ML
INJECTION, POWDER, FOR SOLUTION INTRAMUSCULAR; INTRAVENOUS
Status: DISCONTINUED | OUTPATIENT
Start: 2023-06-30 | End: 2023-07-01 | Stop reason: HOSPADM

## 2023-06-30 RX ORDER — MIDAZOLAM HYDROCHLORIDE 1 MG/ML
INJECTION, SOLUTION INTRAMUSCULAR; INTRAVENOUS
Status: DISCONTINUED | OUTPATIENT
Start: 2023-06-30 | End: 2023-07-01 | Stop reason: HOSPADM

## 2023-06-30 RX ORDER — ISOSORBIDE MONONITRATE 60 MG/1
60 TABLET, EXTENDED RELEASE ORAL 2 TIMES DAILY
Status: DISCONTINUED | OUTPATIENT
Start: 2023-06-30 | End: 2023-07-01 | Stop reason: HOSPADM

## 2023-06-30 RX ORDER — MAG HYDROX/ALUMINUM HYD/SIMETH 200-200-20
SUSPENSION, ORAL (FINAL DOSE FORM) ORAL
Status: DISCONTINUED | OUTPATIENT
Start: 2023-06-30 | End: 2023-07-01 | Stop reason: HOSPADM

## 2023-06-30 RX ORDER — ATORVASTATIN CALCIUM 40 MG/1
40 TABLET, FILM COATED ORAL DAILY
Status: DISCONTINUED | OUTPATIENT
Start: 2023-06-30 | End: 2023-07-01 | Stop reason: HOSPADM

## 2023-06-30 RX ORDER — EZETIMIBE 10 MG/1
10 TABLET ORAL DAILY
Status: DISCONTINUED | OUTPATIENT
Start: 2023-06-30 | End: 2023-07-01 | Stop reason: HOSPADM

## 2023-06-30 RX ORDER — HEPARIN SODIUM 1000 [USP'U]/ML
INJECTION, SOLUTION INTRAVENOUS; SUBCUTANEOUS
Status: DISCONTINUED | OUTPATIENT
Start: 2023-06-30 | End: 2023-07-01 | Stop reason: HOSPADM

## 2023-06-30 RX ORDER — NAPROXEN SODIUM 220 MG/1
81 TABLET, FILM COATED ORAL DAILY
Status: DISCONTINUED | OUTPATIENT
Start: 2023-06-30 | End: 2023-07-01 | Stop reason: HOSPADM

## 2023-06-30 RX ORDER — FENTANYL CITRATE 50 UG/ML
INJECTION, SOLUTION INTRAMUSCULAR; INTRAVENOUS
Status: DISCONTINUED | OUTPATIENT
Start: 2023-06-30 | End: 2023-07-01 | Stop reason: HOSPADM

## 2023-06-30 RX ORDER — CLONAZEPAM 1 MG/1
1 TABLET ORAL 3 TIMES DAILY PRN
Status: DISCONTINUED | OUTPATIENT
Start: 2023-06-30 | End: 2023-07-01 | Stop reason: HOSPADM

## 2023-06-30 RX ORDER — CLOPIDOGREL 300 MG/1
TABLET, FILM COATED ORAL
Status: DISCONTINUED | OUTPATIENT
Start: 2023-06-30 | End: 2023-07-01 | Stop reason: HOSPADM

## 2023-06-30 RX ADMIN — HEPARIN SODIUM 14 UNITS/KG/HR: 10000 INJECTION, SOLUTION INTRAVENOUS at 10:06

## 2023-06-30 RX ADMIN — CLOPIDOGREL BISULFATE 75 MG: 75 TABLET ORAL at 08:06

## 2023-06-30 RX ADMIN — SODIUM CHLORIDE: 9 INJECTION, SOLUTION INTRAVENOUS at 10:06

## 2023-06-30 RX ADMIN — ISOSORBIDE MONONITRATE 60 MG: 60 TABLET, EXTENDED RELEASE ORAL at 10:06

## 2023-06-30 RX ADMIN — ONDANSETRON 4 MG: 2 INJECTION INTRAMUSCULAR; INTRAVENOUS at 04:06

## 2023-06-30 RX ADMIN — FENOFIBRATE 145 MG: 145 TABLET, FILM COATED ORAL at 10:06

## 2023-06-30 RX ADMIN — ASPIRIN 81 MG CHEWABLE TABLET 81 MG: 81 TABLET CHEWABLE at 10:06

## 2023-06-30 RX ADMIN — RANOLAZINE 500 MG: 500 TABLET, EXTENDED RELEASE ORAL at 10:06

## 2023-06-30 RX ADMIN — CLONAZEPAM 1 MG: 1 TABLET ORAL at 10:06

## 2023-06-30 RX ADMIN — EZETIMIBE 10 MG: 10 TABLET ORAL at 10:06

## 2023-06-30 RX ADMIN — ATORVASTATIN CALCIUM 40 MG: 40 TABLET, FILM COATED ORAL at 10:06

## 2023-06-30 RX ADMIN — METOPROLOL TARTRATE 25 MG: 25 TABLET, FILM COATED ORAL at 10:06

## 2023-06-30 NOTE — NURSING
Nurses Note -- 4 Eyes      6/30/2023   9:28 AM      Skin assessed during: Admit      [x] No Altered Skin Integrity Present    []Prevention Measures Documented      [] Yes- Altered Skin Integrity Present or Discovered   [] LDA Added if Not in Epic (Describe Wound)   [] New Altered Skin Integrity was Present on Admit and Documented in LDA   [] Wound Image Taken    Wound Care Consulted? No    Attending Nurse:  Murphy Toledo RN     Second RN/Staff Member:  Semaj Ramirez RN

## 2023-06-30 NOTE — PROGRESS NOTES
[facility] [address]  Cardiology  Progress Note    Patient Name: Nikolas Gutierrez  MRN: [id number]  Admission Date: 6/29/2023  Code Status: Full Code   Attending Provider: Eh Dillard MD   Primary Care Physician: Jennifer Quiros MD  Principal Problem:<principal problem not specified>    Patient information was obtained from patient, past medical records, and ER records.     Subjective:     Chief Complaint: Angina    HPI:   This is a 72-year-old male, who is known to Dr. Gates, with a history of CAD/CABG and PCI, HTN, HLD, venous insufficiency. He initially presented to the clinic for results of stress testing.  While at the clinic patient also complained of angio and dyspnea on exertion.  He was then directed to the ER on 06/29/2023.  Patient will be admitted and started on heparin drip with plans for LHC in a.m. with Dr. Gates.  [facility] has admitted the patient for abnormal stress test and angina.      PMH: CAD/CABG and PCI, HTN, HLD, venous insufficiency  PSH: LHC/PCI, CABG, right total hip replacement, cystectomy, lithotripsy, tonsillectomy, adenectomy  Social History:  Former tobacco use, denies EtOH and illicit drug use  Family History: Father-CVA; brother-dm type 2; sister-dm type 2    Previous Cardiac Diagnostics:   Echo 6.27.23  The study quality is average.   The left ventricle is normal in size. Global left ventricular systolic function is normal. The left ventricular ejection fraction is 40%. The left ventricle diastolic function is impaired (Grade I) with normal left atrial pressure. Noted left ventricular hypertrophy. Asymmetric septal left ventricular hypertrophy is present. It is mild.   Mild (1+) mitral regurgitation. Mild (1+) tricuspid regurgitation.   The pulmonary artery systolic pressure is 18 mmHg.     LHC 4.22.22  LVEDP was mildly elevated between 14-15 mm Hg.    No gradient was identified across the aortic valve on pullback.    No evidence of aortic stenosis.    Left ventriculogram shows  an ejection fraction of 40%.  No significant mitral regurgitation.    LM: Chronically occluded   LAD:  Can only be identified through the flow from the LIMA graft.  It shows good flow through the LIMA graft in the distal portion.  The mid and proximal segments are diffusely diseased.    LCx:  Can only be identified through the retrograde flow from the obtuse marginal graft.  Is occluded proximally.  Obtuse marginal shows good flow through the graft, in the left posterolateral shows good flow through the graft as well.    RCA:  The RCA is only native artery that remains patent itself.  It is the stented from the distal RCA into the PDA.  The posterolateral also has missed stented.  It appears that the PDA in the distal RCA have at least 2 L of stenting.  Is 99% occluded in the PDA.    Graft angiography:  LIMA to LAD: Shows excellent flow to the distal LAD without significant disease.    SVG Y-graft to the obtuse marginal in the left posterolateral branch:  Both arms of the saphenous vein graft are widely patent with excellent flow to the posterolateral and obtuse marginal branches.  Description of intervention:  Successful intervention to the right PDA for 99% stenosis.  Laser arthrectomy was performed throughout the distal RCA in the posterior descending artery utilizing an 0.9 mm laser coronary catheter.  Subsequently, the distal RCA and PDA were treated with balloon angioplasty with a 2.0 x 20 mm balloon followed by 2.5 mm by 20 mm cutting balloon with excellent angiographic results.  The lesion was reduced from 99% to less than 20%.  Coronary flow at the end of intervention was YOEL 3 into the PDA.                  Review of patient's allergies indicates:   Allergen Reactions    Tomato Blisters       Current Facility-Administered Medications on File Prior to Encounter   Medication    HYDROmorphone (PF) injection 0.2 mg    lactated ringers infusion    LIDOcaine (PF) 10 mg/ml (1%) injection 10 mg    ondansetron  injection 4 mg     Current Outpatient Medications on File Prior to Encounter   Medication Sig    atorvastatin (LIPITOR) 40 MG tablet Take 40 mg by mouth once daily.    clonazePAM (KLONOPIN) 1 MG tablet Take 1 tablet (1 mg total) by mouth 3 (three) times daily as needed for Anxiety.    clopidogreL (PLAVIX) 75 mg tablet Take 1 tablet (75 mg total) by mouth Daily.    diclofenac (VOLTAREN) 75 MG EC tablet Take 1 tablet (75 mg total) by mouth 2 (two) times daily.    ezetimibe (ZETIA) 10 mg tablet Take 10 mg by mouth once daily.    fenofibrate (TRICOR) 145 MG tablet Take 1 tablet (145 mg total) by mouth once daily.    fluorouraciL (EFUDEX) 5 % cream Apply topically 2 (two) times daily. Apply topically.    fluticasone propionate (FLONASE) 50 mcg/actuation nasal spray 1 spray by Each Nostril route once daily.    furosemide (LASIX) 20 MG tablet Take 20 mg by mouth once daily at 6am.    HYDROcodone-acetaminophen (NORCO) 5-325 mg per tablet Take 1 tablet by mouth as needed.    isosorbide mononitrate (IMDUR) 60 MG 24 hr tablet Take 60 mg by mouth 2 (two) times daily.    loratadine (CLARITIN) 10 mg tablet Take 10 mg by mouth once daily.    metoprolol tartrate (LOPRESSOR) 25 MG tablet Take 1 tablet by mouth 2 (two) times a day.    nitroGLYCERIN (NITROSTAT) 0.4 MG SL tablet SMARTSI Tablet(s) Sublingual PRN    ranolazine (RANEXA) 500 MG Tb12 Take 500 mg by mouth 2 (two) times daily.    sildenafiL (VIAGRA) 50 MG tablet Take 1 tablet (50 mg total) by mouth daily as needed for Erectile Dysfunction.    tamsulosin (FLOMAX) 0.4 mg Cap Take 1 capsule (0.4 mg total) by mouth once daily.    testosterone cypionate (DEPOTESTOTERONE CYPIONATE) 200 mg/mL injection Inject 1 mL (200 mg total) into the muscle every 14 (fourteen) days.    traMADoL (ULTRAM) 50 mg tablet Take 50 mg by mouth as needed.    XARELTO 2.5 mg Tab Take 1 tablet by mouth 2 (two) times daily.         Review of Systems   Constitutional: Negative for chills and fever.    Cardiovascular:  Negative for chest pain and palpitations.   Respiratory:  Negative for shortness of breath.    Psychiatric/Behavioral:  Negative for altered mental status.    Objective:     Vital Signs (Most Recent):  Temp: 97.9 °F (36.6 °C) (06/30/23 0755)  Pulse: 67 (06/30/23 0755)  Resp: 20 (06/30/23 0403)  BP: 136/67 (06/30/23 0755)  SpO2: 96 % (06/30/23 0755) Vital Signs (24h Range):  Temp:  [97.7 °F (36.5 °C)-98.3 °F (36.8 °C)] 97.9 °F (36.6 °C)  Pulse:  [62-98] 67  Resp:  [16-20] 20  SpO2:  [96 %-99 %] 96 %  BP: (114-156)/(67-85) 136/67     Weight: 71.8 kg (158 lb 4.6 oz)  Body mass index is 24.07 kg/m².    SpO2: 96 %         Intake/Output Summary (Last 24 hours) at 6/30/2023 0911  Last data filed at 6/29/2023 2230  Gross per 24 hour   Intake 450 ml   Output --   Net 450 ml       Lines/Drains/Airways       Peripheral Intravenous Line  Duration                  Peripheral IV - Single Lumen 06/29/23 1148 20 G Left Forearm <1 day                    Physical Exam  Constitutional:       Appearance: Normal appearance.   HENT:      Head: Normocephalic.   Eyes:      Extraocular Movements: Extraocular movements intact.      Conjunctiva/sclera: Conjunctivae normal.   Cardiovascular:      Rate and Rhythm: Normal rate and regular rhythm.      Pulses: Normal pulses.      Heart sounds: Normal heart sounds.   Pulmonary:      Effort: Pulmonary effort is normal.      Breath sounds: Normal breath sounds.   Abdominal:      General: Abdomen is flat.   Musculoskeletal:         General: Normal range of motion.      Cervical back: Normal range of motion and neck supple.   Skin:     General: Skin is warm and dry.   Neurological:      Mental Status: He is alert and oriented to person, place, and time.       Significant Labs: CMP   Recent Labs   Lab 06/29/23  1151 06/29/23  1302    138   K 4.3 5.0   CO2 26 22*   BUN 14.7 15.9   CREATININE 1.10 0.90   CALCIUM 9.8 9.0   ALBUMIN 4.0 3.6   BILITOT 1.1 0.9   ALKPHOS 64 59   AST 22  25   ALT 15 14     , CBC   Recent Labs   Lab 06/29/23  1151 06/29/23  1302 06/30/23  0438   WBC 7.97 6.97 6.61   HGB 18.4* 16.7 16.3   HCT 56.3* 51.4 49.7    184 195     , and Troponin   Recent Labs   Lab 06/29/23  1151 06/29/23  1302   TROPONINI <0.010 <0.010         Significant Imaging:   Imaging Results              X-Ray Chest PA And Lateral (Final result)  Result time 06/29/23 11:59:02      Final result by Larry Gregg MD (06/29/23 11:59:02)                   Impression:      No acute cardiopulmonary abnormality.      Electronically signed by: Larry Gregg MD  Date:    06/29/2023  Time:    11:59               Narrative:    EXAMINATION:  Two view chest radiograph.    CLINICAL HISTORY:  Shortness of breath    TECHNIQUE:  2 view of the chest.    COMPARISON:  Chest radiograph 11/19/2022.    FINDINGS:  The lungs are clear without consolidation or effusion.  There is scarring in the left lung base.  There is no pneumothorax.  The cardiac silhouette is normal in size.  There is no acute osseous abnormality.                                    EKG:      Assessment and Plan:   IMPRESSION:  Angina, currently pain free  Abnormal stress test  CAD/CABG x 3 (LIMA to LAD, SVG Y graft to OM and L PLB) and multiple PCI  HTN  HLD  Venous insufficiency  ICMO/EF 40%    PLAN:  NPO  Continue DAPT  Continue heparin gtt  LHC today    Risk, Benefits and Alternatives Reviewed and Discussed with the PT and their Family and they wish to proceed with above Procedure.      Diogo Padilla [facility]  Cardiology  [facility]  06/30/2023 12:39 PM    Physician addendum:  I have seen and examined this patient as a split-shared visit with the KARIN d/t complicated medical management of above problems written in assessment and high acuity requiring physician expertise in medical decision-making. I performed the substantive portion of the history and exam. Above medical decision-making is also formulated by  me.    Cardiovascular exam:  S1, S2  Lungs:  Fine crackles at bases.  Extremities:  1+ Edema bilaterally    Plan:  History of CABG, recurrent angina plan for catheterization today.  No contraindication.    Eh Dillard MD  Cardiologist

## 2023-07-01 VITALS
WEIGHT: 158.31 LBS | HEART RATE: 64 BPM | BODY MASS INDEX: 23.99 KG/M2 | DIASTOLIC BLOOD PRESSURE: 68 MMHG | TEMPERATURE: 98 F | HEIGHT: 68 IN | SYSTOLIC BLOOD PRESSURE: 139 MMHG | OXYGEN SATURATION: 94 % | RESPIRATION RATE: 18 BRPM

## 2023-07-01 LAB
ANION GAP SERPL CALC-SCNC: 5 MEQ/L
APTT PPP: 29.6 SECONDS (ref 23.2–33.7)
BASOPHILS # BLD AUTO: 0.02 X10(3)/MCL
BASOPHILS NFR BLD AUTO: 0.4 %
BUN SERPL-MCNC: 17.6 MG/DL (ref 8.4–25.7)
CALCIUM SERPL-MCNC: 8.6 MG/DL (ref 8.8–10)
CHLORIDE SERPL-SCNC: 104 MMOL/L (ref 98–107)
CO2 SERPL-SCNC: 27 MMOL/L (ref 23–31)
CREAT SERPL-MCNC: 1 MG/DL (ref 0.73–1.18)
CREAT/UREA NIT SERPL: 18
EOSINOPHIL # BLD AUTO: 0.03 X10(3)/MCL (ref 0–0.9)
EOSINOPHIL NFR BLD AUTO: 0.5 %
ERYTHROCYTE [DISTWIDTH] IN BLOOD BY AUTOMATED COUNT: 16 % (ref 11.5–17)
GFR SERPLBLD CREATININE-BSD FMLA CKD-EPI: >60 MLS/MIN/1.73/M2
GLUCOSE SERPL-MCNC: 94 MG/DL (ref 82–115)
HCT VFR BLD AUTO: 45.5 % (ref 42–52)
HGB BLD-MCNC: 14.8 G/DL (ref 14–18)
IMM GRANULOCYTES # BLD AUTO: 0.02 X10(3)/MCL (ref 0–0.04)
IMM GRANULOCYTES NFR BLD AUTO: 0.4 %
LYMPHOCYTES # BLD AUTO: 1.2 X10(3)/MCL (ref 0.6–4.6)
LYMPHOCYTES NFR BLD AUTO: 21.4 %
MCH RBC QN AUTO: 29.9 PG (ref 27–31)
MCHC RBC AUTO-ENTMCNC: 32.5 G/DL (ref 33–36)
MCV RBC AUTO: 91.9 FL (ref 80–94)
MONOCYTES # BLD AUTO: 0.38 X10(3)/MCL (ref 0.1–1.3)
MONOCYTES NFR BLD AUTO: 6.8 %
NEUTROPHILS # BLD AUTO: 3.96 X10(3)/MCL (ref 2.1–9.2)
NEUTROPHILS NFR BLD AUTO: 70.5 %
NRBC BLD AUTO-RTO: 0 %
PLATELET # BLD AUTO: 179 X10(3)/MCL (ref 130–400)
PMV BLD AUTO: 10 FL (ref 7.4–10.4)
POC ACTIVATED CLOTTING TIME K: 167 SEC (ref 74–137)
POC ACTIVATED CLOTTING TIME K: 203 SEC (ref 74–137)
POC ACTIVATED CLOTTING TIME K: 233 SEC (ref 74–137)
POTASSIUM SERPL-SCNC: 4.8 MMOL/L (ref 3.5–5.1)
RBC # BLD AUTO: 4.95 X10(6)/MCL (ref 4.7–6.1)
SAMPLE: ABNORMAL
SODIUM SERPL-SCNC: 136 MMOL/L (ref 136–145)
WBC # SPEC AUTO: 5.61 X10(3)/MCL (ref 4.5–11.5)

## 2023-07-01 PROCEDURE — 93010 EKG 12-LEAD: ICD-10-PCS | Mod: ,,, | Performed by: INTERNAL MEDICINE

## 2023-07-01 PROCEDURE — 25000003 PHARM REV CODE 250: Performed by: NURSE PRACTITIONER

## 2023-07-01 PROCEDURE — 85730 THROMBOPLASTIN TIME PARTIAL: CPT | Performed by: STUDENT IN AN ORGANIZED HEALTH CARE EDUCATION/TRAINING PROGRAM

## 2023-07-01 PROCEDURE — 80048 BASIC METABOLIC PNL TOTAL CA: CPT | Performed by: INTERNAL MEDICINE

## 2023-07-01 PROCEDURE — 25000003 PHARM REV CODE 250: Performed by: STUDENT IN AN ORGANIZED HEALTH CARE EDUCATION/TRAINING PROGRAM

## 2023-07-01 PROCEDURE — 93005 ELECTROCARDIOGRAM TRACING: CPT

## 2023-07-01 PROCEDURE — 85025 COMPLETE CBC W/AUTO DIFF WBC: CPT | Performed by: STUDENT IN AN ORGANIZED HEALTH CARE EDUCATION/TRAINING PROGRAM

## 2023-07-01 PROCEDURE — 93010 ELECTROCARDIOGRAM REPORT: CPT | Mod: ,,, | Performed by: INTERNAL MEDICINE

## 2023-07-01 RX ORDER — NAPROXEN SODIUM 220 MG/1
81 TABLET, FILM COATED ORAL DAILY
Refills: 0
Start: 2023-07-01 | End: 2024-06-30

## 2023-07-01 RX ADMIN — EZETIMIBE 10 MG: 10 TABLET ORAL at 09:07

## 2023-07-01 RX ADMIN — ASPIRIN 81 MG CHEWABLE TABLET 81 MG: 81 TABLET CHEWABLE at 09:07

## 2023-07-01 RX ADMIN — FENOFIBRATE 145 MG: 145 TABLET, FILM COATED ORAL at 09:07

## 2023-07-01 RX ADMIN — CLONAZEPAM 1 MG: 1 TABLET ORAL at 09:07

## 2023-07-01 RX ADMIN — ATORVASTATIN CALCIUM 40 MG: 40 TABLET, FILM COATED ORAL at 09:07

## 2023-07-01 RX ADMIN — METOPROLOL TARTRATE 25 MG: 25 TABLET, FILM COATED ORAL at 09:07

## 2023-07-01 RX ADMIN — RANOLAZINE 500 MG: 500 TABLET, EXTENDED RELEASE ORAL at 09:07

## 2023-07-01 RX ADMIN — CLOPIDOGREL BISULFATE 75 MG: 75 TABLET ORAL at 09:07

## 2023-07-01 RX ADMIN — ISOSORBIDE MONONITRATE 60 MG: 60 TABLET, EXTENDED RELEASE ORAL at 09:07

## 2023-07-01 NOTE — PROGRESS NOTES
Patient's discharge instructions given to patient, medications and follow ups reviewed with patient. Patient discharged home with family.

## 2023-07-01 NOTE — DISCHARGE SUMMARY
[facility]  Cardiology  Discharge Summary    Patient Name: Nikolas Gutierrez  MRN: [id number]  Admission Date: 6/29/2023  Code Status: Full Code   Attending Provider: Del Gates MD   Primary Care Physician: Jennifer Quiros MD  Principal Problem:Unstable angina    Patient information was obtained from patient, past medical records, and ER records.     Subjective:     Chief Complaint: Angina    Hospital Course:  This is a 72-year-old male, who is known to Dr. Gates, with a history of CAD/CABG and PCI, HTN, HLD, venous insufficiency. He initially presented to the clinic for results of stress testing.  While at the clinic patient also complained of angio and dyspnea on exertion.  He was then directed to the ER on 06/29/2023.  Patient will be admitted and started on heparin drip with plans for LHC in a.m. with Dr. Gates.  He underwent left heart catheterization with successful laser arthrectomy and balloon angioplasty to the distal RCA and right PDA.  Right groin benign.  Follow-up with Amber Oliva on 07/11/2023 at 2:30 p.m..  Patient is stable for discharge.    PMH: CAD/CABG and PCI, HTN, HLD, venous insufficiency  PSH: LHC/PCI, CABG, right total hip replacement, cystectomy, lithotripsy, tonsillectomy, adenectomy  Social History:  Former tobacco use, denies EtOH and illicit drug use  Family History: Father-CVA; brother-dm type 2; sister-dm type 2    Previous Cardiac Diagnostics:   [facility] 6.30.23  LM: Chronically occluded   LAD:  Can only be identified through the flow from the LIMA graft.  It shows good flow through the LIMA graft in the distal portion.  The mid and proximal segments are diffusely diseased.    LCx:  Can only be identified through the retrograde flow from the obtuse marginal graft.  Is occluded proximally.  Obtuse marginal shows good flow through the graft, in the left posterolateral shows good flow through the graft as well.    RCA:  The RCA is only native artery that remains  patent itself.  It is the stented from the distal RCA into the PDA.  The posterolateral also has been stented.  It appears that the PDA and the distal RCA have at least 2 layers of stenting.  Is 90% occluded in the PDA.       Graft angiography:  LIMA to LAD: Shows excellent flow to the distal LAD without significant disease.    SVG Y-graft to the obtuse marginal in the left posterolateral branch:  Both arms of the saphenous vein graft are widely patent with excellent flow to the posterolateral and obtuse marginal branches.     Description of intervention:  Successful intervention to the right PDA for 90% stenosis.  Laser arthrectomy was performed throughout the distal RCA in the posterior descending artery utilizing an 0.9 mm laser coronary catheter.  Subsequently, the distal RCA and PDA were treated with balloon angioplasty with a 2.5 x 20 mm balloon.  The lesion was reduced from 99% to less than 20%.  Coronary flow at the end of intervention was YOEL 3 into the PDA.    Echo 6.27.23  The study quality is average.   The left ventricle is normal in size. Global left ventricular systolic function is normal. The left ventricular ejection fraction is 40%. The left ventricle diastolic function is impaired (Grade I) with normal left atrial pressure. Noted left ventricular hypertrophy. Asymmetric septal left ventricular hypertrophy is present. It is mild.   Mild (1+) mitral regurgitation. Mild (1+) tricuspid regurgitation.   The pulmonary artery systolic pressure is 18 mmHg.     LHC 4.22.22  LVEDP was mildly elevated between 14-15 mm Hg.    No gradient was identified across the aortic valve on pullback.    No evidence of aortic stenosis.    Left ventriculogram shows an ejection fraction of 40%.  No significant mitral regurgitation.    LM: Chronically occluded   LAD:  Can only be identified through the flow from the LIMA graft.  It shows good flow through the LIMA graft in the distal portion.  The mid and proximal segments  are diffusely diseased.    LCx:  Can only be identified through the retrograde flow from the obtuse marginal graft.  Is occluded proximally.  Obtuse marginal shows good flow through the graft, in the left posterolateral shows good flow through the graft as well.    RCA:  The RCA is only native artery that remains patent itself.  It is the stented from the distal RCA into the PDA.  The posterolateral also has missed stented.  It appears that the PDA in the distal RCA have at least 2 L of stenting.  Is 99% occluded in the PDA.    Graft angiography:  LIMA to LAD: Shows excellent flow to the distal LAD without significant disease.    SVG Y-graft to the obtuse marginal in the left posterolateral branch:  Both arms of the saphenous vein graft are widely patent with excellent flow to the posterolateral and obtuse marginal branches.  Description of intervention:  Successful intervention to the right PDA for 99% stenosis.  Laser arthrectomy was performed throughout the distal RCA in the posterior descending artery utilizing an 0.9 mm laser coronary catheter.  Subsequently, the distal RCA and PDA were treated with balloon angioplasty with a 2.0 x 20 mm balloon followed by 2.5 mm by 20 mm cutting balloon with excellent angiographic results.  The lesion was reduced from 99% to less than 20%.  Coronary flow at the end of intervention was YOEL 3 into the PDA.                  Review of patient's allergies indicates:   Allergen Reactions    Tomato Blisters       Current Facility-Administered Medications on File Prior to Encounter   Medication    HYDROmorphone (PF) injection 0.2 mg    lactated ringers infusion    LIDOcaine (PF) 10 mg/ml (1%) injection 10 mg    ondansetron injection 4 mg     Current Outpatient Medications on File Prior to Encounter   Medication Sig    atorvastatin (LIPITOR) 40 MG tablet Take 40 mg by mouth once daily.    clonazePAM (KLONOPIN) 1 MG tablet Take 1 tablet (1 mg total) by mouth 3 (three) times daily as  needed for Anxiety.    clopidogreL (PLAVIX) 75 mg tablet Take 1 tablet (75 mg total) by mouth Daily.    diclofenac (VOLTAREN) 75 MG EC tablet Take 1 tablet (75 mg total) by mouth 2 (two) times daily.    ezetimibe (ZETIA) 10 mg tablet Take 10 mg by mouth once daily.    fenofibrate (TRICOR) 145 MG tablet Take 1 tablet (145 mg total) by mouth once daily.    fluorouraciL (EFUDEX) 5 % cream Apply topically 2 (two) times daily. Apply topically.    fluticasone propionate (FLONASE) 50 mcg/actuation nasal spray 1 spray by Each Nostril route once daily.    furosemide (LASIX) 20 MG tablet Take 20 mg by mouth once daily at 6am.    HYDROcodone-acetaminophen (NORCO) 5-325 mg per tablet Take 1 tablet by mouth as needed.    isosorbide mononitrate (IMDUR) 60 MG 24 hr tablet Take 60 mg by mouth 2 (two) times daily.    loratadine (CLARITIN) 10 mg tablet Take 10 mg by mouth once daily.    metoprolol tartrate (LOPRESSOR) 25 MG tablet Take 1 tablet by mouth 2 (two) times a day.    nitroGLYCERIN (NITROSTAT) 0.4 MG SL tablet SMARTSI Tablet(s) Sublingual PRN    ranolazine (RANEXA) 500 MG Tb12 Take 500 mg by mouth 2 (two) times daily.    sildenafiL (VIAGRA) 50 MG tablet Take 1 tablet (50 mg total) by mouth daily as needed for Erectile Dysfunction.    tamsulosin (FLOMAX) 0.4 mg Cap Take 1 capsule (0.4 mg total) by mouth once daily.    testosterone cypionate (DEPOTESTOTERONE CYPIONATE) 200 mg/mL injection Inject 1 mL (200 mg total) into the muscle every 14 (fourteen) days.    traMADoL (ULTRAM) 50 mg tablet Take 50 mg by mouth as needed.    XARELTO 2.5 mg Tab Take 1 tablet by mouth 2 (two) times daily.         Review of Systems   Constitutional: Negative for chills and fever.   Cardiovascular:  Negative for chest pain and palpitations.   Respiratory:  Negative for shortness of breath.    Psychiatric/Behavioral:  Negative for altered mental status.    Objective:     Vital Signs (Most Recent):  Temp: 97.6 °F (36.4 °C) (23)  Pulse:  (!) 58 (07/01/23 0457)  Resp: 20 (06/30/23 2123)  BP: 106/65 (07/01/23 0457)  SpO2: (!) 88 % (06/30/23 2346) Vital Signs (24h Range):  Temp:  [97.6 °F (36.4 °C)-98.2 °F (36.8 °C)] 97.6 °F (36.4 °C)  Pulse:  [47-82] 58  Resp:  [18-20] 20  SpO2:  [83 %-98 %] 88 %  BP: ()/(40-69) 106/65     Weight: 71.8 kg (158 lb 4.6 oz)  Body mass index is 24.07 kg/m².    SpO2: (!) 88 %         Intake/Output Summary (Last 24 hours) at 7/1/2023 0735  Last data filed at 6/30/2023 1400  Gross per 24 hour   Intake 0 ml   Output --   Net 0 ml         Lines/Drains/Airways       Peripheral Intravenous Line  Duration                  Peripheral IV - Single Lumen 06/29/23 1148 20 G Left Forearm 1 day         Peripheral IV - Single Lumen 06/30/23 1400 20 G Left;Posterior Forearm <1 day                    Physical Exam  Constitutional:       Appearance: Normal appearance.   HENT:      Head: Normocephalic.   Eyes:      Extraocular Movements: Extraocular movements intact.      Conjunctiva/sclera: Conjunctivae normal.   Cardiovascular:      Rate and Rhythm: Normal rate and regular rhythm.      Pulses: Normal pulses.      Heart sounds: Normal heart sounds.   Pulmonary:      Effort: Pulmonary effort is normal.      Breath sounds: Normal breath sounds.   Abdominal:      General: Abdomen is flat.   Musculoskeletal:         General: Normal range of motion.      Cervical back: Normal range of motion and neck supple.   Skin:     General: Skin is warm and dry.      Comments: R Groin Soft/Flat, Non-Tender, No Sign of Bleed/Infection. +2 BLE Palpable Pedal Pulses     Neurological:      Mental Status: He is alert and oriented to person, place, and time.       Significant Labs: CMP   Recent Labs   Lab 06/29/23  1151 06/29/23  1302 07/01/23  0601    138 136   K 4.3 5.0 4.8   CO2 26 22* 27   BUN 14.7 15.9 17.6   CREATININE 1.10 0.90 1.00   CALCIUM 9.8 9.0 8.6*   ALBUMIN 4.0 3.6  --    BILITOT 1.1 0.9  --    ALKPHOS 64 59  --    AST 22 25  --    ALT  15 14  --      , CBC   Recent Labs   Lab 06/29/23  1302 06/30/23  0438 07/01/23  0601   WBC 6.97 6.61 5.61   HGB 16.7 16.3 14.8   HCT 51.4 49.7 45.5    195 179     , and Troponin   Recent Labs   Lab 06/29/23  1151 06/29/23  1302   TROPONINI <0.010 <0.010         Significant Imaging:   Imaging Results              X-Ray Chest PA And Lateral (Final result)  Result time 06/29/23 11:59:02      Final result by Larry Gregg MD (06/29/23 11:59:02)                   Impression:      No acute cardiopulmonary abnormality.      Electronically signed by: Larry Gregg MD  Date:    06/29/2023  Time:    11:59               Narrative:    EXAMINATION:  Two view chest radiograph.    CLINICAL HISTORY:  Shortness of breath    TECHNIQUE:  2 view of the chest.    COMPARISON:  Chest radiograph 11/19/2022.    FINDINGS:  The lungs are clear without consolidation or effusion.  There is scarring in the left lung base.  There is no pneumothorax.  The cardiac silhouette is normal in size.  There is no acute osseous abnormality.                                    EKG:      Assessment and Plan:   IMPRESSION:  Angina, currently pain free  Abnormal stress test  CAD/CABG x 3 (LIMA to LAD, SVG Y graft to OM and L PLB) and multiple PCI  HTN  HLD  Venous insufficiency  ICMO/EF 40%    PLAN:  DC home today  Continue DAPT  Right groin precautions/activity restrictions  F/U with Amber Craven on 7.11.23 @ 1430    DISCHARGE PLAN:  Discharge: Home  Discharge Diet: Cardiac, Low Sodium  Discharge Condition: Stable  Discharge Activity:  As Tolerated, No Heavy Lifting > 5 lbs., Notify MD with Symptoms of Bleed/Infection  Discharge Time: 36 minutes    Discharge Medications:     Medication List        START taking these medications      aspirin 81 MG Chew  Take 1 tablet (81 mg total) by mouth once daily.            CONTINUE taking these medications      atorvastatin 40 MG tablet  Commonly known as: LIPITOR     clonazePAM 1 MG tablet  Commonly known as:  KlonoPIN  Take 1 tablet (1 mg total) by mouth 3 (three) times daily as needed for Anxiety.     clopidogreL 75 mg tablet  Commonly known as: PLAVIX  Take 1 tablet (75 mg total) by mouth Daily.     diclofenac 75 MG EC tablet  Commonly known as: VOLTAREN  Take 1 tablet (75 mg total) by mouth 2 (two) times daily.     ezetimibe 10 mg tablet  Commonly known as: ZETIA     fenofibrate 145 MG tablet  Commonly known as: TRICOR  Take 1 tablet (145 mg total) by mouth once daily.     fluorouraciL 5 % cream  Commonly known as: EFUDEX  Apply topically 2 (two) times daily. Apply topically.     fluticasone propionate 50 mcg/actuation nasal spray  Commonly known as: FLONASE     furosemide 20 MG tablet  Commonly known as: LASIX     isosorbide mononitrate 60 MG 24 hr tablet  Commonly known as: IMDUR     loratadine 10 mg tablet  Commonly known as: CLARITIN     metoprolol tartrate 25 MG tablet  Commonly known as: LOPRESSOR     nitroGLYCERIN 0.4 MG SL tablet  Commonly known as: NITROSTAT     ranolazine 500 MG Tb12  Commonly known as: RANEXA     sildenafiL 50 MG tablet  Commonly known as: VIAGRA  Take 1 tablet (50 mg total) by mouth daily as needed for Erectile Dysfunction.     tamsulosin 0.4 mg Cap  Commonly known as: FLOMAX  Take 1 capsule (0.4 mg total) by mouth once daily.     testosterone cypionate 200 mg/mL injection  Commonly known as: DEPOTESTOTERONE CYPIONATE  Inject 1 mL (200 mg total) into the muscle every 14 (fourteen) days.     traMADoL 50 mg tablet  Commonly known as: ULTRAM     XARELTO 2.5 mg Tab  Generic drug: rivaroxaban            STOP taking these medications      HYDROcodone-acetaminophen 5-325 mg per tablet  Commonly known as: NORCO               Where to Get Your Medications        Information about where to get these medications is not yet available    Ask your nurse or doctor about these medications  aspirin 81 MG Chew           JUNE Hart-BC  Cardiology  [facility]  07/01/2023  12:39 PM  Physician addendum:  I have seen and examined this patient as a split-shared visit with the KARIN d/t complicated medical management of above problems written in assessment and high acuity requiring physician expertise in medical decision-making. I performed the substantive portion of the history and exam. Above medical decision-making is also formulated by me.    Pt. Would like to be discharged.   Plan:   Medications reviewed. Discharge to home. Answered all questions prior to discharge.   F/U scheduled as above.     Eh Dillard MD  Cardiologist

## 2023-07-01 NOTE — H&P
Patient name: Nikolas Gutierrez  MRN: [id number]  : 1950  Cath Lab Procedure H&P Update    Pre-Procedure Assessment:    I saw and examined the patient face to face. The patient has been re-evaluated and his condition is unchanged. The reason for admission, procedure and care is still present.  Based on the patients H&P, pre-procedure physical exam, relevant diagnostic studies, NPO status and information obtained from the patient, I determine the patient is an appropriate candidate for the proposed procedure and anesthesia planned. I further certify the anesthesia risks, benefits and options have been explained to the patient to which he agrees as documented on the procedural consent.    See scanned updated H&P and additional records from media tab.      Del Gates

## 2023-07-03 ENCOUNTER — PATIENT OUTREACH (OUTPATIENT)
Dept: ADMINISTRATIVE | Facility: CLINIC | Age: 73
End: 2023-07-03
Payer: COMMERCIAL

## 2023-07-03 NOTE — PROGRESS NOTES
C3 nurse attempted to contact Nikolas Gutierrez  for a TCC post hospital discharge follow up call. No answer. Left voicemail with callback information. The patient has a scheduled HOSFU appointment with JULIAN Jefferson on 07/11/2023 @ 230 pm.

## 2023-07-05 NOTE — PROGRESS NOTES
C3 nurse spoke with Nikolas Gutierrez  for a TCC post hospital discharge follow up call. Patient verified name, refused to verify birthday except for year, stated he was uncomfortable, thank you and abruptly disconnected call.  The patient has a scheduled HOSFU appointment with JULIAN Jefferson on 07/11/2023 @ 230 pm.

## 2023-07-11 ENCOUNTER — OFFICE VISIT (OUTPATIENT)
Dept: FAMILY MEDICINE | Facility: CLINIC | Age: 73
End: 2023-07-11
Payer: COMMERCIAL

## 2023-07-11 VITALS
HEIGHT: 68 IN | RESPIRATION RATE: 20 BRPM | SYSTOLIC BLOOD PRESSURE: 102 MMHG | TEMPERATURE: 98 F | OXYGEN SATURATION: 99 % | BODY MASS INDEX: 23.56 KG/M2 | DIASTOLIC BLOOD PRESSURE: 65 MMHG | HEART RATE: 99 BPM | WEIGHT: 155.44 LBS

## 2023-07-11 DIAGNOSIS — L57.0 ACTINIC KERATOSIS: Primary | ICD-10-CM

## 2023-07-11 DIAGNOSIS — L98.9 SKIN LESION: ICD-10-CM

## 2023-07-11 PROCEDURE — 17000 DESTRUCT PREMALG LESION: CPT | Mod: PBBFAC

## 2023-07-11 PROCEDURE — 99215 OFFICE O/P EST HI 40 MIN: CPT | Mod: PBBFAC

## 2023-07-11 PROCEDURE — 17003 DESTRUCT PREMALG LES 2-14: CPT | Mod: PBBFAC

## 2023-07-11 RX ORDER — FLUOROURACIL 50 MG/G
CREAM TOPICAL 2 TIMES DAILY
Qty: 40 G | Refills: 2 | Status: SHIPPED | OUTPATIENT
Start: 2023-07-11

## 2023-07-11 NOTE — PROGRESS NOTES
"  Subjective:       Patient ID: Nikolas Gutierrez is a 72 y.o. male.    Chief Complaint: Follow-up (Skin lesion both arms)      Follow-up    72 year old male with history of basal cell and squamous cell carcinoma removed in 2013 presents for complaints of skin lesions on his scalp and bilateral arms. Ongoing for years. Last Saw dermatology in 2020 and was given Efudex cream with minimal improvement.     Review of Systems   Integumentary:         Lesion on scalp and forearms  Bruising on forearms       Objective:       Vital Signs  Temp: 97.9 °F (36.6 °C)  Temp Source: Oral  Pulse: 99  Resp: 20  SpO2: 99 %  BP: 102/65  BP Location: Left arm  Patient Position: Sitting  Pain Score: 0-No pain  Height and Weight  Height: 5' 8" (172.7 cm)  Weight: 70.5 kg (155 lb 6.8 oz)  BSA (Calculated - sq m): 1.84 sq meters  BMI (Calculated): 23.6  Weight in (lb) to have BMI = 25: 164.1]  Physical Exam  HENT:      Head: Atraumatic.   Skin:     Comments: Scattered scaly lesions each about 1-2mm large on the right and central scalp, bilateral forearms, and one on the right ear    Scattered ecchymosis throughout bilateral forearms (recently hospitalized and currently on blood thinners)   Neurological:      Mental Status: He is alert.   Psychiatric:      Comments: Mood and behavior appropriate during visit.          Procedure Note:  Procedure: Cryotherapy  Performed by: Jeremi Pardo MD  Supervised by: Dr. Jesusita Monreal MD  Consent: signed consent obtained after discussion of risks, benefits, and alternative treatments  Description:  Liquid nitrogen used for cryotherapy. Tip held 1 cm from lesion and sprayed in freeze-thaw cycle.   The patient tolerated the procedure well with no complications.      Assessment:       Problem List Items Addressed This Visit          Derm    Actinic keratosis - Primary    Relevant Medications    fluorouraciL (EFUDEX) 5 % cream     Other Visit Diagnoses       Skin lesion                Plan:     Actinic " Keratosis:  - s/p cryotherapy  - Continue using Efudex cream; prescription refilled  - postcare instructions given; return precautions  - F/U in 6 months

## 2023-07-13 DIAGNOSIS — F41.9 ANXIETY: ICD-10-CM

## 2023-07-13 NOTE — TELEPHONE ENCOUNTER
----- Message from Martha Pacheco sent at 7/13/2023  3:09 PM CDT -----           Provider: DORSI Quiros      Last Visit: 4/2023       Next Visit: 07/2023       Patient's Contact #: [phone]       Medication Name & Dose: clonazePAM (KLONOPIN) 1 MG tablet       Preferred Pharmacy: [address]      Comment: Patient states they are out of this medication and needs a refill. Please advise.

## 2023-07-14 RX ORDER — CLONAZEPAM 1 MG/1
1 TABLET ORAL 3 TIMES DAILY PRN
Qty: 90 TABLET | Refills: 0 | Status: SHIPPED | OUTPATIENT
Start: 2023-07-14 | End: 2023-08-29 | Stop reason: SDUPTHER

## 2023-07-20 ENCOUNTER — OFFICE VISIT (OUTPATIENT)
Dept: INTERNAL MEDICINE | Facility: CLINIC | Age: 73
End: 2023-07-20
Payer: COMMERCIAL

## 2023-07-20 VITALS
HEART RATE: 72 BPM | TEMPERATURE: 98 F | BODY MASS INDEX: 23.95 KG/M2 | HEIGHT: 68 IN | RESPIRATION RATE: 18 BRPM | WEIGHT: 158 LBS | DIASTOLIC BLOOD PRESSURE: 60 MMHG | SYSTOLIC BLOOD PRESSURE: 98 MMHG

## 2023-07-20 DIAGNOSIS — M51.36 DEGENERATION OF INTERVERTEBRAL DISC OF LUMBAR REGION: Primary | ICD-10-CM

## 2023-07-20 DIAGNOSIS — I10 HYPERTENSION, UNSPECIFIED TYPE: ICD-10-CM

## 2023-07-20 DIAGNOSIS — I25.10 CAD, MULTIPLE VESSEL: ICD-10-CM

## 2023-07-20 DIAGNOSIS — F41.1 GENERALIZED ANXIETY DISORDER: ICD-10-CM

## 2023-07-20 PROCEDURE — 99213 OFFICE O/P EST LOW 20 MIN: CPT | Mod: PBBFAC | Performed by: INTERNAL MEDICINE

## 2023-07-20 RX ORDER — TIZANIDINE 4 MG/1
4 TABLET ORAL EVERY 6 HOURS PRN
Qty: 15 TABLET | Refills: 0 | Status: SHIPPED | OUTPATIENT
Start: 2023-07-20 | End: 2023-07-30

## 2023-07-20 NOTE — PROGRESS NOTES
[facility] INTERNAL MEDICINE  OUTPATIENT OFFICE VISIT NOTE    SUBJECTIVE:      HPI: Nikolas Gutierrez is a 72 y.o. male here today for F/U    This is a 72-year-old male, who is known to Dr. Gates, with a history of CAD/CABG and PCI, HTN, HLD, venous insufficiency. He initially presented to the clinic for results of stress testing.  While at the clinic patient also complained of angio and dyspnea on exertion.  He was then directed to the ER on 2023.  Patient will be admitted and started on heparin drip with plans for LHC in a.m. with Dr. Gates.  He underwent left heart catheterization with successful laser arthrectomy and balloon angioplasty to the distal RCA and right PDA.    Mr. Connor is under  stress today.  Recently he lost his job.  He states that he is also undergoing some stress at home because of that his been noticing some tightening spasms his low back.  Current Outpatient Medications   Medication Instructions    aspirin 81 mg, Oral, Daily    atorvastatin (LIPITOR) 40 mg, Oral, Daily    clonazePAM (KLONOPIN) 1 mg, Oral, 3 times daily PRN    clopidogreL (PLAVIX) 75 mg, Oral, Daily    diclofenac (VOLTAREN) 75 mg, Oral, 2 times daily    ezetimibe (ZETIA) 10 mg, Oral, Daily    fenofibrate (TRICOR) 145 mg, Oral, Daily    fluorouraciL (EFUDEX) 5 % cream Topical (Top), 2 times daily, Apply topically.    fluticasone propionate (FLONASE) 50 mcg/actuation nasal spray 1 spray, Each Nostril, Daily    furosemide (LASIX) 20 mg, Oral, Daily    isosorbide mononitrate (IMDUR) 60 mg, Oral, 2 times daily    loratadine (CLARITIN) 10 mg, Oral, Daily    metoprolol tartrate (LOPRESSOR) 25 MG tablet 1 tablet, Oral, 2 times daily    nitroGLYCERIN (NITROSTAT) 0.4 MG SL tablet SMARTSI Tablet(s) Sublingual PRN    ranolazine (RANEXA) 500 mg, Oral, 2 times daily    sildenafiL (VIAGRA) 50 mg, Oral, Daily PRN    tamsulosin (FLOMAX) 0.4 mg, Oral, Daily    testosterone cypionate (DEPOTESTOTERONE CYPIONATE) 200 mg, Intramuscular, Every 14  "days    traMADoL (ULTRAM) 50 mg, Oral, As needed (PRN)    XARELTO 2.5 mg Tab 1 tablet, Oral, 2 times daily       ROS:  CONSTITUTIONAL: Denies weight loss, fever and chills.  HEENT: Denies changes in vision and hearing.  ?RESPIRATORY: Denies SOB and cough.?  CV: Denies palpitations and CP. ?  GI: Denies abdominal pain, nausea, vomiting and diarrhea.?  : Denies dysuria and urinary frequency.?  MSK: Denies myalgia and joint pain.?  SKIN: Denies rash and pruritus.  ?NEUROLOGICAL: Denies headache and syncope.?  PSYCHIATRIC: Denies recent changes in mood. Denies anxiety and depression.     OBJECTIVE:     Physical Examination:    Visit Vitals  BP 98/60 (BP Location: Left arm, Patient Position: Sitting, BP Method: Medium (Automatic))   Pulse 72   Temp 98.2 °F (36.8 °C) (Oral)   Resp 18   Ht 5' 8" (1.727 m)   Wt 71.7 kg (158 lb)   BMI 24.02 kg/m²        General: Well nourished w/o distress  HEENT: NC/AT; PERRLA; nasal and oral mucosa moist and clear; no sinus tenderness; no thyromegaly  Neck: Full ROM; no lymphadenopathy  Pulm: CTA bilaterally, normal work of breathing  CV: S1, S2 w/o murmurs or gallops; no edema noted  GI: Soft with normal bowel sounds in all quadrants, no masses on palpation  MSK: Full ROM of all extremities and spine w/o limitation or discomfort  Derm: No rashes, abnormal bruising, or skin lesions  Neuro: AAOx4; CN II-XII intact; motor/sensory function intact  Psych: Cooperative; appropriate mood and affect           ASSESSMENT & PLAN:     CAD      July 2023- left heart catheterization with successful laser arthrectomy and balloon angioplasty to the distal RCA and right PDA.         Remains on meds as above           2. Lumbar deg disc ds      Recovering well from spine surgery- fusion      Continuing with PT         4. Allergic Rhinitis      Stable on Claritin, Flonase     5. HTN      On Lisinopril, Lasix,      6. Hyperlipidemia      Atorvastatin, Finofibrate     7. Scalp lesions- ?actinic keratosis    "   F/u in Derm clinic- seen 2 weeks ago- underwent cryotherapy- on Efudex cream     8. ?Sciatica       Tizanidine po               No follow-ups on file.     Future Appointments   Date Time Provider Department Center   10/26/2023  1:30 PM Jennifer Quiros MD [facility] Freddy Quiros MD

## 2023-08-29 DIAGNOSIS — F41.9 ANXIETY: ICD-10-CM

## 2023-08-29 RX ORDER — LISINOPRIL 5 MG/1
5 TABLET ORAL DAILY
Qty: 90 TABLET | Refills: 3 | Status: SHIPPED | OUTPATIENT
Start: 2023-08-29 | End: 2024-08-28

## 2023-08-29 RX ORDER — SILDENAFIL 50 MG/1
50 TABLET, FILM COATED ORAL DAILY PRN
Qty: 30 TABLET | Refills: 11 | Status: SHIPPED | OUTPATIENT
Start: 2023-08-29 | End: 2023-10-18

## 2023-08-29 RX ORDER — CLONAZEPAM 1 MG/1
1 TABLET ORAL 3 TIMES DAILY PRN
Qty: 90 TABLET | Refills: 0 | Status: SHIPPED | OUTPATIENT
Start: 2023-08-29 | End: 2023-10-09 | Stop reason: SDUPTHER

## 2023-08-29 NOTE — TELEPHONE ENCOUNTER
----- Message from Martha Pacheco sent at 8/29/2023  3:25 PM CDT -----           Provider: Malcolm      Last Visit: 07/2023       Next Visit: 10/2023       Patient's Contact #: [phone]       Medication Name & Dose: clonazePAM (KLONOPIN) 1 MG tablet  Lisinopril 5mg tablet  sildenafiL (VIAGRA) 50 MG tablet    Preferred Pharmacy: [facility] pharmacy      Comment:   Patient states that he is out of these medication and needs refills. Thanks

## 2023-10-09 DIAGNOSIS — F41.9 ANXIETY: ICD-10-CM

## 2023-10-09 DIAGNOSIS — I25.10 CORONARY ARTERY DISEASE, UNSPECIFIED VESSEL OR LESION TYPE, UNSPECIFIED WHETHER ANGINA PRESENT, UNSPECIFIED WHETHER NATIVE OR TRANSPLANTED HEART: ICD-10-CM

## 2023-10-09 RX ORDER — CLOPIDOGREL BISULFATE 75 MG/1
75 TABLET ORAL DAILY
Qty: 30 TABLET | Refills: 4 | Status: SHIPPED | OUTPATIENT
Start: 2023-10-09

## 2023-10-09 RX ORDER — CLONAZEPAM 1 MG/1
1 TABLET ORAL 3 TIMES DAILY PRN
Qty: 90 TABLET | Refills: 0 | Status: SHIPPED | OUTPATIENT
Start: 2023-10-09 | End: 2023-10-26 | Stop reason: SDUPTHER

## 2023-10-09 NOTE — TELEPHONE ENCOUNTER
----- Message from Gwen Webb sent at 10/9/2023 11:04 AM CDT -----  Regarding: Dr Quiros RX RQ  Provider: Dr Quiros      Last Visit: 7/20/23      Next Visit: 10/26/23       Patient's Contact #: [phone]      Medication Name & Dose: Clopidogrel 75mg                                             Clonazepam 1mg       Preferred Pharmacy: [facility]      Comment:

## 2023-10-18 DIAGNOSIS — N40.0 BENIGN PROSTATIC HYPERPLASIA, UNSPECIFIED WHETHER LOWER URINARY TRACT SYMPTOMS PRESENT: Primary | ICD-10-CM

## 2023-10-18 RX ORDER — TADALAFIL 5 MG/1
5 TABLET ORAL DAILY PRN
Qty: 30 TABLET | Refills: 11 | Status: SHIPPED | OUTPATIENT
Start: 2023-10-18 | End: 2024-10-17

## 2023-10-18 RX ORDER — TAMSULOSIN HYDROCHLORIDE 0.4 MG/1
0.4 CAPSULE ORAL DAILY
Qty: 90 CAPSULE | Refills: 3 | Status: SHIPPED | OUTPATIENT
Start: 2023-10-18 | End: 2024-10-17

## 2023-10-26 ENCOUNTER — OFFICE VISIT (OUTPATIENT)
Dept: INTERNAL MEDICINE | Facility: CLINIC | Age: 73
End: 2023-10-26
Payer: MEDICARE

## 2023-10-26 VITALS
SYSTOLIC BLOOD PRESSURE: 118 MMHG | WEIGHT: 145 LBS | HEIGHT: 68 IN | TEMPERATURE: 97 F | HEART RATE: 100 BPM | BODY MASS INDEX: 21.98 KG/M2 | DIASTOLIC BLOOD PRESSURE: 75 MMHG | OXYGEN SATURATION: 96 % | RESPIRATION RATE: 20 BRPM

## 2023-10-26 DIAGNOSIS — Z00.00 HEALTHCARE MAINTENANCE: Primary | ICD-10-CM

## 2023-10-26 DIAGNOSIS — I10 HYPERTENSION, UNSPECIFIED TYPE: ICD-10-CM

## 2023-10-26 DIAGNOSIS — M19.90 OSTEOARTHRITIS, UNSPECIFIED OSTEOARTHRITIS TYPE, UNSPECIFIED SITE: ICD-10-CM

## 2023-10-26 DIAGNOSIS — E78.5 DYSLIPIDEMIA: ICD-10-CM

## 2023-10-26 DIAGNOSIS — M51.36 DEGENERATION OF INTERVERTEBRAL DISC OF LUMBAR REGION: ICD-10-CM

## 2023-10-26 DIAGNOSIS — I87.2 VENOUS INSUFFICIENCY OF LOWER EXTREMITY, UNSPECIFIED LATERALITY: ICD-10-CM

## 2023-10-26 DIAGNOSIS — F41.1 GENERALIZED ANXIETY DISORDER: ICD-10-CM

## 2023-10-26 DIAGNOSIS — I25.10 CAD, MULTIPLE VESSEL: ICD-10-CM

## 2023-10-26 DIAGNOSIS — I20.89 STABLE ANGINA PECTORIS: ICD-10-CM

## 2023-10-26 DIAGNOSIS — F41.9 ANXIETY: ICD-10-CM

## 2023-10-26 PROCEDURE — 99215 OFFICE O/P EST HI 40 MIN: CPT | Mod: PBBFAC | Performed by: INTERNAL MEDICINE

## 2023-10-26 PROCEDURE — G0008 ADMIN INFLUENZA VIRUS VAC: HCPCS | Mod: PBBFAC

## 2023-10-26 PROCEDURE — 90694 VACC AIIV4 NO PRSRV 0.5ML IM: CPT | Mod: PBBFAC

## 2023-10-26 RX ORDER — FLUTICASONE PROPIONATE 50 MCG
1 SPRAY, SUSPENSION (ML) NASAL DAILY
Qty: 11.1 ML | Refills: 6 | Status: SHIPPED | OUTPATIENT
Start: 2023-10-26

## 2023-10-26 RX ORDER — CLONAZEPAM 1 MG/1
1 TABLET ORAL 3 TIMES DAILY PRN
Qty: 90 TABLET | Refills: 0 | Status: SHIPPED | OUTPATIENT
Start: 2023-10-26 | End: 2023-11-16 | Stop reason: SDUPTHER

## 2023-10-26 RX ADMIN — INFLUENZA A VIRUS A/VICTORIA/4897/2022 IVR-238 (H1N1) ANTIGEN (FORMALDEHYDE INACTIVATED), INFLUENZA A VIRUS A/DARWIN/6/2021 IVR-227 (H3N2) ANTIGEN (FORMALDEHYDE INACTIVATED), INFLUENZA B VIRUS B/AUSTRIA/1359417/2021 BVR-26 ANTIGEN (FORMALDEHYDE INACTIVATED), INFLUENZA B VIRUS B/PHUKET/3073/2013 BVR-1B ANTIGEN (FORMALDEHYDE INACTIVATED) 0.5 ML: 15; 15; 15; 15 INJECTION, SUSPENSION INTRAMUSCULAR at 02:10

## 2023-10-26 NOTE — PROGRESS NOTES
[facility] INTERNAL MEDICINE  OUTPATIENT OFFICE VISIT NOTE    SUBJECTIVE:      HPI: Nikolas Gutierrez is a 72 y.o. male here today for f/u.  This is a 72-year-old male, who is known to Dr. Gates, with a history of CAD/CABG and PCI, HTN, HLD, venous insufficiency.   Mr. Connor is doing fair today.  Recently he lost his job.  He states that he is also undergoing some stress at home because of that his been noticing some tightening spasms his low back- this has been ongoing but manageable.    Current Outpatient Medications   Medication Instructions    aspirin 81 mg, Oral, Daily    atorvastatin (LIPITOR) 40 mg, Oral, Daily    clonazePAM (KLONOPIN) 1 mg, Oral, 3 times daily PRN    clopidogreL (PLAVIX) 75 mg, Oral, Daily    diclofenac (VOLTAREN) 75 mg, Oral, 2 times daily    ezetimibe (ZETIA) 10 mg, Oral, Daily    fenofibrate (TRICOR) 145 mg, Oral, Daily    fluorouraciL (EFUDEX) 5 % cream Topical (Top), 2 times daily, Apply topically.    fluticasone propionate (FLONASE) 50 mcg, Each Nostril, Daily    furosemide (LASIX) 20 mg, Oral, Daily    isosorbide mononitrate (IMDUR) 60 mg, Oral, 2 times daily    lisinopriL (PRINIVIL,ZESTRIL) 5 mg, Oral, Daily    loratadine (CLARITIN) 10 mg, Oral, Daily    metoprolol tartrate (LOPRESSOR) 25 MG tablet 1 tablet, Oral, 2 times daily    nitroGLYCERIN (NITROSTAT) 0.4 MG SL tablet SMARTSI Tablet(s) Sublingual PRN    ranolazine (RANEXA) 500 mg, Oral, 2 times daily    tadalafiL (CIALIS) 5 mg, Oral, Daily PRN    tamsulosin (FLOMAX) 0.4 mg, Oral, Daily    testosterone cypionate (DEPOTESTOTERONE CYPIONATE) 200 mg, Intramuscular, Every 14 days    traMADoL (ULTRAM) 50 mg, Oral, As needed (PRN)    XARELTO 2.5 mg Tab 1 tablet, Oral, 2 times daily       ROS:  CONSTITUTIONAL: Denies weight loss, fever and chills.  HEENT: Denies changes in vision and hearing.  ?RESPIRATORY: Denies SOB and cough.?  CV: Denies palpitations and CP. ?  GI: Denies abdominal pain, nausea, vomiting and diarrhea.?  : Denies  "dysuria and urinary frequency.?  MSK: Denies myalgia and joint pain.?  SKIN: Denies rash and pruritus.  ?NEUROLOGICAL: Denies headache and syncope.?  PSYCHIATRIC: Denies recent changes in mood. Denies anxiety and depression.     OBJECTIVE:         Visit Vitals  /75 (BP Location: Right arm, Patient Position: Sitting, BP Method: Medium (Automatic))   Pulse 100   Temp 97.4 °F (36.3 °C) (Oral)   Resp 20   Ht 5' 8" (1.727 m)   Wt 65.8 kg (145 lb)   SpO2 96%   BMI 22.05 kg/m²        General: Well nourished w/o distress  HEENT: NC/AT; PERRLA; nasal and oral mucosa moist and clear; no sinus tenderness; no thyromegaly  Neck: Full ROM; no lymphadenopathy  Pulm: CTA bilaterally, normal work of breathing  CV: S1, S2 w/o murmurs or gallops; no edema noted  GI: Soft with normal bowel sounds in all quadrants, no masses on palpation  MSK: Full ROM of all extremities and spine w/o limitation or discomfort  Derm: No rashes, abnormal bruising, or skin lesions  Neuro: AAOx4; CN II-XII intact; motor/sensory function intact  Psych: Cooperative; appropriate mood and affect       ASSESSMENT & PLAN:     CAD      July 2023- left heart catheterization with successful laser arthrectomy and balloon angioplasty to the distal RCA and right PDA.         Remains on meds as above           2. Lumbar deg disc ds      Recovering well from spine surgery- fusion      Continuing with PT         4. Allergic Rhinitis      Stable on Claritin, Flonase     5. HTN      On Lisinopril, Lasix,      6. Hyperlipidemia      Atorvastatin, Finofibrate     7. Scalp lesions- ?actinic keratosis      F/u in Derm clinic- seen 2 weeks ago- underwent cryotherapy- on Efudex cream     8. ?Sciatica       Tizanidine po            Follow up in about 6 months (around 4/26/2024).     No future appointments.       Jennifer Quiros MD    "

## 2023-11-13 RX ORDER — ATORVASTATIN CALCIUM 40 MG/1
40 TABLET, FILM COATED ORAL DAILY
Qty: 30 TABLET | Refills: 1 | Status: SHIPPED | OUTPATIENT
Start: 2023-11-13 | End: 2024-01-25

## 2023-11-13 NOTE — TELEPHONE ENCOUNTER
----- Message from Martha Pacheco sent at 11/13/2023  3:13 PM CST -----           Provider: DORIS Quiros      Last Visit: 10/2023       Next Visit: 01/2024       Patient's Contact #: [phone]        Medication Name & Dose: clonazePAM (KLONOPIN) 1 MG tablet        Preferred Pharmacy: [facility]      Comment:   Patient needs refill on medication. Thanks

## 2023-11-16 DIAGNOSIS — F41.9 ANXIETY: ICD-10-CM

## 2023-11-16 RX ORDER — CLONAZEPAM 1 MG/1
1 TABLET ORAL 3 TIMES DAILY PRN
Qty: 90 TABLET | Refills: 0 | Status: SHIPPED | OUTPATIENT
Start: 2023-11-16 | End: 2024-01-19 | Stop reason: SDUPTHER

## 2023-11-16 NOTE — TELEPHONE ENCOUNTER
----- Message from Delia Espinoza sent at 11/15/2023  1:59 PM CST -----       Provider:  Dr. Ishaan Cook Pharmacy:     Last Visit: Oct. 26, 2023  Next Visit:  Jan. 25, 2024  Patient's Contact Number: [phone]       1. Name of Medication: clonazePAM (KLONOPIN) 1 MG tablet 90 tablet   Dosage:1 MG tablet 90 tablet       Thanks  Delai

## 2024-01-19 DIAGNOSIS — F41.9 ANXIETY: ICD-10-CM

## 2024-01-24 RX ORDER — CLONAZEPAM 1 MG/1
1 TABLET ORAL 3 TIMES DAILY PRN
Qty: 90 TABLET | Refills: 0 | Status: SHIPPED | OUTPATIENT
Start: 2024-01-24 | End: 2024-03-18 | Stop reason: SDUPTHER

## 2024-01-25 ENCOUNTER — OFFICE VISIT (OUTPATIENT)
Dept: INTERNAL MEDICINE | Facility: CLINIC | Age: 74
End: 2024-01-25
Payer: MEDICARE

## 2024-01-25 VITALS
HEIGHT: 68 IN | BODY MASS INDEX: 23.85 KG/M2 | WEIGHT: 157.38 LBS | RESPIRATION RATE: 16 BRPM | TEMPERATURE: 98 F | HEART RATE: 65 BPM | DIASTOLIC BLOOD PRESSURE: 68 MMHG | OXYGEN SATURATION: 98 % | SYSTOLIC BLOOD PRESSURE: 103 MMHG

## 2024-01-25 DIAGNOSIS — J30.9 ALLERGIC RHINITIS, UNSPECIFIED SEASONALITY, UNSPECIFIED TRIGGER: ICD-10-CM

## 2024-01-25 DIAGNOSIS — I10 HYPERTENSION, UNSPECIFIED TYPE: ICD-10-CM

## 2024-01-25 DIAGNOSIS — Z85.828 HISTORY OF SQUAMOUS CELL CARCINOMA OF SKIN: ICD-10-CM

## 2024-01-25 DIAGNOSIS — F41.1 GENERALIZED ANXIETY DISORDER: ICD-10-CM

## 2024-01-25 DIAGNOSIS — M51.36 DEGENERATION OF INTERVERTEBRAL DISC OF LUMBAR REGION: Primary | ICD-10-CM

## 2024-01-25 DIAGNOSIS — I25.10 CAD, MULTIPLE VESSEL: ICD-10-CM

## 2024-01-25 DIAGNOSIS — E78.5 DYSLIPIDEMIA: ICD-10-CM

## 2024-01-25 PROCEDURE — 99215 OFFICE O/P EST HI 40 MIN: CPT | Mod: PBBFAC | Performed by: INTERNAL MEDICINE

## 2024-01-25 NOTE — PROGRESS NOTES
[facility] INTERNAL MEDICINE  OUTPATIENT OFFICE VISIT NOTE    SUBJECTIVE:      HPI: Nikolas Gutierrez is a 73 y.o. male here today for f/u  This is a 72-year-old male, who is known to Dr. Gates, with a history of CAD/CABG and PCI, HTN, HLD, venous insufficiency.   Mr. Connor is doing fair today.  Recently he lost his job.  He states that he is also undergoing some stress at home because of that his been noticing some tightening spasms his low back- this has been ongoing but manageable. He is now- >1 year out from spinal surgery-   Current Outpatient Medications   Medication Instructions    aspirin 81 mg, Oral, Daily    atorvastatin (LIPITOR) 40 mg, Oral, Daily    clonazePAM (KLONOPIN) 1 mg, Oral, 3 times daily PRN    clopidogreL (PLAVIX) 75 mg, Oral, Daily    diclofenac (VOLTAREN) 75 mg, Oral, 2 times daily    ezetimibe (ZETIA) 10 mg, Oral, Daily    fenofibrate (TRICOR) 145 mg, Oral, Daily    fluorouraciL (EFUDEX) 5 % cream Topical (Top), 2 times daily, Apply topically.    fluticasone propionate (FLONASE) 50 mcg, Each Nostril, Daily    furosemide (LASIX) 20 mg, Oral, Daily    isosorbide mononitrate (IMDUR) 60 mg, Oral, 2 times daily    lisinopriL (PRINIVIL,ZESTRIL) 5 mg, Oral, Daily    loratadine (CLARITIN) 10 mg, Oral, Daily    metoprolol tartrate (LOPRESSOR) 25 MG tablet 1 tablet, Oral, 2 times daily    nitroGLYCERIN (NITROSTAT) 0.4 MG SL tablet SMARTSI Tablet(s) Sublingual PRN    ranolazine (RANEXA) 500 mg, Oral, 2 times daily    tadalafiL (CIALIS) 5 mg, Oral, Daily PRN    tamsulosin (FLOMAX) 0.4 mg, Oral, Daily    testosterone cypionate (DEPOTESTOTERONE CYPIONATE) 200 mg, Intramuscular, Every 14 days    traMADoL (ULTRAM) 50 mg, Oral, As needed (PRN)    XARELTO 2.5 mg Tab 1 tablet, Oral, 2 times daily       ROS:  CONSTITUTIONAL: Denies weight loss, fever and chills.  HEENT: Denies changes in vision and hearing.  ?RESPIRATORY: Denies SOB and cough.?  CV: Denies palpitations and CP. ?  GI: Denies abdominal pain, nausea,  "vomiting and diarrhea.?  : Denies dysuria and urinary frequency.?  MSK: Denies myalgia and joint pain.?  SKIN: Denies rash and pruritus.  ?NEUROLOGICAL: Denies headache and syncope.?  PSYCHIATRIC: Denies recent changes in mood. Denies anxiety and depression.     OBJECTIVE:       Visit Vitals  Ht 5' 8" (1.727 m)   Wt 71.4 kg (157 lb 6.4 oz)   BMI 23.93 kg/m²        General: Well nourished w/o distress  HEENT: NC/AT; PERRLA; nasal and oral mucosa moist and clear; no sinus tenderness; no thyromegaly  Neck: Full ROM; no lymphadenopathy  Pulm: CTA bilaterally, normal work of breathing  CV: S1, S2 w/o murmurs or gallops; no edema noted  GI: Soft with normal bowel sounds in all quadrants, no masses on palpation  MSK: Full ROM of all extremities and spine w/o limitation or discomfort  Derm: No rashes, abnormal bruising, or skin lesions  Neuro: AAOx4; CN II-XII intact; motor/sensory function intact  Psych: Cooperative; appropriate mood and affect         ASSESSMENT & PLAN:     CAD      July 2023- left heart catheterization with successful laser arthrectomy and balloon angioplasty to the distal RCA and right PDA.         Remains on meds as above- cruzito to see [facility] in a few days           2. Lumbar deg disc ds      Recovering well from spine surgery- fusion      Continuing with PT         4. Allergic Rhinitis      Stable on Claritin, Flonase     5. HTN      On Lisinopril, Lasix,      6. Hyperlipidemia      Atorvastatin, Finofibrate     7. Scalp lesions- ?actinic keratosis      F/u in Derm clinic- seen 2 weeks ago- underwent cryotherapy- on Efudex cream     8. ?Sciatica       Tizanidine po    9. Generalized anxiety     On Klonopin           Follow up in about 6 months (around 7/25/2024).     Future Appointments   Date Time Provider Department Center   1/25/2024  1:50 PM Jennifer Quiros MD [facility]   1/29/2024  8:30 AM PROCEDURE, [facility] FAMILY MEDICINE RESIDENTS [facility] RES [facility]        Jennifer Quiros MD  "

## 2024-01-29 ENCOUNTER — OFFICE VISIT (OUTPATIENT)
Dept: FAMILY MEDICINE | Facility: CLINIC | Age: 74
End: 2024-01-29
Payer: MEDICARE

## 2024-01-29 VITALS
BODY MASS INDEX: 23.95 KG/M2 | WEIGHT: 158 LBS | RESPIRATION RATE: 18 BRPM | SYSTOLIC BLOOD PRESSURE: 121 MMHG | HEART RATE: 69 BPM | HEIGHT: 68 IN | OXYGEN SATURATION: 98 % | DIASTOLIC BLOOD PRESSURE: 72 MMHG | TEMPERATURE: 98 F

## 2024-01-29 DIAGNOSIS — L57.0 ACTINIC KERATOSES: Primary | ICD-10-CM

## 2024-01-29 PROCEDURE — 99213 OFFICE O/P EST LOW 20 MIN: CPT | Mod: PBBFAC | Performed by: FAMILY MEDICINE

## 2024-01-29 RX ORDER — IMIQUIMOD 12.5 MG/.25G
CREAM TOPICAL
Qty: 24 PACKET | Refills: 2 | Status: SHIPPED | OUTPATIENT
Start: 2024-01-29 | End: 2024-03-25

## 2024-01-29 NOTE — PROGRESS NOTES
"Subjective:       Patient ID: Nikolas Gutierrez is a 73 y.o. male.    Chief Complaint: Follow-up (Actinic Keratosis/New area to left neck area.)      Follow-up      74 yo male with numerous Aks on the scalp and neck. Has tried efudex with little improvement. Cryo has not improved lesions.  After discussion, we will try imiquimod cream.    Review of Systems   Integumentary:         As above         Objective:       Vital Signs  Temp: 97.9 °F (36.6 °C)  Temp Source: Oral  Pulse: 69  Resp: 18  SpO2: 98 %  BP: 121/72  BP Location: Right arm  Patient Position: Sitting  Height and Weight  Height: 5' 8" (172.7 cm)  Weight: 71.7 kg (158 lb)  BSA (Calculated - sq m): 1.85 sq meters  BMI (Calculated): 24  Weight in (lb) to have BMI = 25: 164.1]  Physical Exam  Vitals reviewed.   Constitutional:       Appearance: Normal appearance.   HENT:      Head: Normocephalic and atraumatic.   Eyes:      Extraocular Movements: Extraocular movements intact.      Conjunctiva/sclera: Conjunctivae normal.   Skin:     Comments: Numerous actinic keratoses on scalp, neck, face, and nose   Neurological:      General: No focal deficit present.      Mental Status: He is alert.   Psychiatric:         Mood and Affect: Mood normal.         Behavior: Behavior normal.         Assessment:       Problem List Items Addressed This Visit    None  Visit Diagnoses       Actinic keratoses    -  Primary    Relevant Medications    imiquimod (ALDARA) 5 % cream            Plan:   Encouraged to use imiquimod as directed for 2 months  ER precautions  FU in 2 months    "

## 2024-01-29 NOTE — PROGRESS NOTES
Subjective:       Patient ID: Nikolas Gutierrez is a 73 y.o. male.    Chief Complaint: No chief complaint on file.      Follow-up      72 year old male with history of basal cell and squamous cell carcinoma removed in 2013 presents for f/u of skin lesions on his scalp and bilateral arms. Ongoing for years. Last Saw dermatology in 2020 and was given Efudex cream with minimal improvement. Cryotherapy at last visit, patient tolerated well.    Review of Systems    As per hpi  Objective:          Physical Exam  HENT:      Head: Atraumatic.   Skin:     Comments: Scattered scaly lesions each about 1-2mm large on the right and central scalp, bilateral forearms, and one on the right ear    Scattered ecchymosis throughout bilateral forearms (recently hospitalized and currently on blood thinners)   Neurological:      Mental Status: He is alert.   Psychiatric:      Comments: Mood and behavior appropriate during visit.            Procedure Note:  Procedure: Cryotherapy  Performed by:   Supervised by:   Consent: signed consent obtained after discussion of risks, benefits, and alternative treatments  Description:  Liquid nitrogen used for cryotherapy. Tip held 1 cm from lesion and sprayed in freeze-thaw cycle.   The patient tolerated the procedure well with no complications.      Assessment:       Problem List Items Addressed This Visit    None        Plan:     Actinic Keratosis:  - s/p cryotherapy  - Continue using Efudex cream; prescription refilled  - postcare instructions given; return precautions  - F/U in 6 months    Anders Cee DO  \[facility]\"" Family Medicine Resident, HO-1

## 2024-03-18 DIAGNOSIS — F41.9 ANXIETY: ICD-10-CM

## 2024-03-18 RX ORDER — CLONAZEPAM 1 MG/1
1 TABLET ORAL 3 TIMES DAILY PRN
Qty: 90 TABLET | Refills: 0 | Status: SHIPPED | OUTPATIENT
Start: 2024-03-18 | End: 2024-04-24 | Stop reason: SDUPTHER

## 2024-03-18 NOTE — TELEPHONE ENCOUNTER
----- Message from Bailee Rivera sent at 3/18/2024  1:55 PM CDT -----  Regarding: Med Refill  Patient presented to clinic stating he needs a refill on his clonazepam 1 mg prescription. Was last seen in January and next appt is scheduled for 7/25. Phone number in chart has been verified. Thanks!

## 2024-03-25 ENCOUNTER — OFFICE VISIT (OUTPATIENT)
Dept: FAMILY MEDICINE | Facility: CLINIC | Age: 74
End: 2024-03-25
Payer: MEDICARE

## 2024-03-25 VITALS
OXYGEN SATURATION: 98 % | SYSTOLIC BLOOD PRESSURE: 133 MMHG | BODY MASS INDEX: 23.82 KG/M2 | DIASTOLIC BLOOD PRESSURE: 69 MMHG | RESPIRATION RATE: 20 BRPM | HEART RATE: 91 BPM | TEMPERATURE: 98 F | HEIGHT: 68 IN | WEIGHT: 157.19 LBS

## 2024-03-25 DIAGNOSIS — L57.0 ACTINIC KERATOSES: Primary | ICD-10-CM

## 2024-03-25 PROCEDURE — 17003 DESTRUCT PREMALG LES 2-14: CPT | Mod: PBBFAC

## 2024-03-25 PROCEDURE — 17000 DESTRUCT PREMALG LESION: CPT | Mod: PBBFAC

## 2024-03-25 PROCEDURE — 99215 OFFICE O/P EST HI 40 MIN: CPT | Mod: PBBFAC,25

## 2024-03-25 RX ORDER — IMIQUIMOD 12.5 MG/.25G
CREAM TOPICAL
Qty: 12 PACKET | Refills: 2 | Status: SHIPPED | OUTPATIENT
Start: 2024-03-25

## 2024-03-25 NOTE — PROGRESS NOTES
"[facility] OFFICE VISIT NOTE  Nikolas Gutierrez  [id number]  03/25/2024    Chief Complaint   Patient presents with    Actinic Keratosis    Follow-up     Actinic keratosis         HPI  Nikolas Gutierrez is a 73 y.o. male  presenting to [facility] f/u of AK lesions of the scalp. Previously tried efudex with little improvement. Cryo has not improved lesions. Prescribed imiquimod for lesions 2 months ago, not using consistently.     ROS  As per HPI    Blood pressure 133/69, pulse 91, temperature 97.5 °F (36.4 °C), temperature source Oral, resp. rate 20, height 5' 7.99" (1.727 m), weight 71.3 kg (157 lb 3.2 oz), SpO2 98 %.   Physical Exam  Vitals reviewed.   Skin:     General: Skin is warm and dry.      Findings: No erythema.      Comments: Numerous rough, scaly lesions of the scalp and face.    Neurological:      Mental Status: He is alert.         Current Medications:   Current Outpatient Medications   Medication Sig Dispense Refill    aspirin 81 MG Chew Take 1 tablet (81 mg total) by mouth once daily. (Patient not taking: Reported on 10/26/2023)  0    clonazePAM (KLONOPIN) 1 MG tablet Take 1 tablet (1 mg total) by mouth 3 (three) times daily as needed for Anxiety. 90 tablet 0    clopidogreL (PLAVIX) 75 mg tablet Take 1 tablet (75 mg total) by mouth Daily. (Patient not taking: Reported on 1/25/2024) 30 tablet 4    diclofenac (VOLTAREN) 75 MG EC tablet Take 1 tablet (75 mg total) by mouth 2 (two) times daily. (Patient not taking: Reported on 1/25/2024) 60 tablet 2    ezetimibe (ZETIA) 10 mg tablet Take 10 mg by mouth once daily.      fluorouraciL (EFUDEX) 5 % cream Apply topically 2 (two) times daily. Apply topically. 40 g 2    fluticasone propionate (FLONASE) 50 mcg/actuation nasal spray 1 spray (50 mcg total) by Each Nostril route once daily. 11.1 mL 6    furosemide (LASIX) 20 MG tablet Take 20 mg by mouth once daily at 6am.      imiquimod (ALDARA) 5 % cream Apply topically 3 (three) times a week. 12 packet 2    isosorbide " mononitrate (IMDUR) 60 MG 24 hr tablet Take 60 mg by mouth 2 (two) times daily.      lisinopriL (PRINIVIL,ZESTRIL) 5 MG tablet Take 1 tablet (5 mg total) by mouth once daily. (Patient not taking: Reported on 2024) 90 tablet 3    loratadine (CLARITIN) 10 mg tablet Take 10 mg by mouth once daily.      metoprolol tartrate (LOPRESSOR) 25 MG tablet Take 1 tablet by mouth 2 (two) times a day.      nitroGLYCERIN (NITROSTAT) 0.4 MG SL tablet SMARTSI Tablet(s) Sublingual PRN      ranolazine (RANEXA) 500 MG Tb12 Take 500 mg by mouth 2 (two) times daily.      tadalafiL (CIALIS) 5 MG tablet Take 1 tablet (5 mg total) by mouth daily as needed for Erectile Dysfunction. 30 tablet 11    tamsulosin (FLOMAX) 0.4 mg Cap Take 1 capsule (0.4 mg total) by mouth once daily. 90 capsule 3    testosterone cypionate (DEPOTESTOTERONE CYPIONATE) 200 mg/mL injection Inject 1 mL (200 mg total) into the muscle every 14 (fourteen) days. 10 mL 1    traMADoL (ULTRAM) 50 mg tablet Take 50 mg by mouth as needed.      XARELTO 2.5 mg Tab Take 1 tablet by mouth 2 (two) times daily.       Current Facility-Administered Medications   Medication Dose Route Frequency Provider Last Rate Last Admin    triamcinolone acetonide injection 40 mg  40 mg Intra-articular 1 time in Clinic/HOD Kamini Freeman NP         Facility-Administered Medications Ordered in Other Visits   Medication Dose Route Frequency Provider Last Rate Last Admin    HYDROmorphone (PF) injection 0.2 mg  0.2 mg Intravenous Q5 Min PRN Christopher Ojeda MD   0.2 mg at 22 0245    lactated ringers infusion   Intravenous Continuous Viral Gonzalez MD        LIDOcaine (PF) 10 mg/ml (1%) injection 10 mg  1 mL Intradermal Once Viral Gonzalez MD        ondansetron injection 4 mg  4 mg Intravenous Once PRN Christopher Ojeda MD         Destruction, Premalignant Lesion    Date/Time: 3/25/2024 8:30 AM    Performed by: Jesusita Mitchell DO  Authorized by: Jesusita Mitchell DO   "  Consent Done?:  Yes (Verbal) and Yes (Written)  Time out: Immediately prior to procedure a "time out" was called to verify the correct patient, procedure, equipment, support staff and site/side marked as required.      Indications:  Actinic keratosis    Location(s):  Head/Neck:  Forehead and scalp    Number of lesions:  8  Destruction method:  Cryotherapy  Bleeding:  None   Patient tolerated the procedure well with no immediate complications.   Post-operative instructions were provided for the patient.      Assessment:   1. Actinic keratoses        Plan:  - Cryotherapy performed as above  - Continue Aldara cream  - F/u in 1 month     Orders Placed This Encounter    Destruction, Premalignant Lesion    imiquimod (ALDARA) 5 % cream         Jesusita Mitchell DO  U  HO-1     "

## 2024-04-22 ENCOUNTER — OFFICE VISIT (OUTPATIENT)
Dept: FAMILY MEDICINE | Facility: CLINIC | Age: 74
End: 2024-04-22
Payer: MEDICARE

## 2024-04-22 VITALS
WEIGHT: 158.75 LBS | HEART RATE: 67 BPM | BODY MASS INDEX: 24.06 KG/M2 | OXYGEN SATURATION: 99 % | SYSTOLIC BLOOD PRESSURE: 125 MMHG | TEMPERATURE: 99 F | DIASTOLIC BLOOD PRESSURE: 65 MMHG | HEIGHT: 68 IN | RESPIRATION RATE: 19 BRPM

## 2024-04-22 DIAGNOSIS — L57.0 ACTINIC KERATOSIS: Primary | ICD-10-CM

## 2024-04-22 PROCEDURE — 17000 DESTRUCT PREMALG LESION: CPT | Mod: PBBFAC | Performed by: FAMILY MEDICINE

## 2024-04-22 PROCEDURE — 99213 OFFICE O/P EST LOW 20 MIN: CPT | Mod: PBBFAC,25

## 2024-04-22 PROCEDURE — 17003 DESTRUCT PREMALG LES 2-14: CPT | Mod: PBBFAC

## 2024-04-22 NOTE — PROGRESS NOTES
I have seen the patient, reviewed the resident's history and physical, assessment, plan, and progress note. I have personally interviewed and examined the patient at bedside and: agree with the findings.     Matti Simmons MD  [facility]

## 2024-04-22 NOTE — PROGRESS NOTES
"[facility] Minor procedures Clinic Note    CHIEF COMPLAINT:  Chief Complaint   Patient presents with    Actinic Keratosis       HISTORY OF  PRESENT ILLNESS:  Nikolas Gutierrez is a 73 y.o. male who presents to [facility] minor procedures clinic today for follow-up for actinic keratosis.  Patient was prescribed Imiquimod in January.  However, he states that it was too expensive and so he has been using only the Efudex once or twice a week.  He reports some improvement in scaly lesions.      REVIEW OF SYSTEMS:  See HPI      PHYSICAL EXAMINATION:  Temperature 98.6 °F (37 °C), resp. rate 19, height 5' 7.72" (1.72 m), weight 72 kg (158 lb 11.7 oz), SpO2 99%.    General:  VSS. No acute distress.   Skin:  2 scaly patches on scalp, 1 at the bridge of nose.       ASSESSMENT/PLAN:  1) Actinic keratosis  Cryotherapy done to described lesions above; see procedure note below.  Continue Efudex cream        Procedure Note  Procedure: Cryotherapy  Performed by: Lili Santa MD  Supervised By: Matti Simmons MD  Consent: Signed consent obtained after discussion of risks, benefits, and alternative treatments  Number of lesions: 3  Description: Liquid nitrogen used for cryotherapy. Tip held 1 cm from lesion and sprayed in freeze-thaw cycle. The patient tolerated the procedure well with no complications.          Lili Santa M.D  [facility] Family Medicine Resident, HO-I    "

## 2024-04-24 DIAGNOSIS — F41.9 ANXIETY: ICD-10-CM

## 2024-04-24 RX ORDER — CLONAZEPAM 1 MG/1
1 TABLET ORAL 3 TIMES DAILY PRN
Qty: 90 TABLET | Refills: 0 | Status: SHIPPED | OUTPATIENT
Start: 2024-04-24 | End: 2024-05-24

## 2024-04-24 NOTE — TELEPHONE ENCOUNTER
----- Message from Yadira Real sent at 4/24/2024  7:31 AM CDT -----  Regarding: FW: Med Refill    ----- Message -----  From: Jennifer Quiros MD  Sent: 4/23/2024   1:32 PM CDT  To: Yadira Real  Subject: RE: Med Refill                                   Please propose to me  ----- Message -----  From: Yadira Real  Sent: 4/23/2024  11:36 AM CDT  To: Jennifer Quiros MD  Subject: Med Refill                                          Refill Request     Provider: Dr. Quiros     Last Visit: 04/22/2024     Next Visit: 07/25/2024     Patient's Contact #: [phone]     Medication Name & Dose: clonazePAM (KLONOPIN) 1 MG tablet     Preferred Pharmacy: [facility] [address] [place] [address]         Comment:

## 2024-06-10 RX ORDER — LISINOPRIL 5 MG/1
5 TABLET ORAL DAILY
Qty: 90 TABLET | Refills: 3 | Status: CANCELLED | OUTPATIENT
Start: 2024-06-10 | End: 2025-06-10

## 2024-06-10 NOTE — TELEPHONE ENCOUNTER
----- Message from Delia Espinoza sent at 6/7/2024  3:50 PM CDT -----  Regarding: Dr. Quiros     Refill Request     Provider: Dr. Quiros         Medication Name & Dose: Lisinopril 5mg     Please Advice    Thanks  Delia

## 2024-06-12 NOTE — TELEPHONE ENCOUNTER
Placed call to [facility] pharmacy and informed patient has 1 refill remaining on Lisinopril rx.   Contacted patient ID and  verified. Patient informed of Lisinopril rx had 1 refill remaining and will be filled by [facility] pharmacy on today. Patient verbalized complete understanding.

## 2024-06-26 NOTE — PROGRESS NOTES
[facility] INTERNAL MEDICINE  OUTPATIENT OFFICE VISIT NOTE    SUBJECTIVE:      HPI: Nikolas Gutierrez is a 73 y.o. male here today for followup  This is a 72-year-old male, who is known to Dr. Gates, with a history of CAD/CABG and PCI, HTN, HLD, venous insufficiency.   Mr. Connor is doing fair today.  Recently he lost his job.  He states that he is also undergoing some stress at home because of that his been noticing some tightening spasms his low back- this has been ongoing but manageable. He is now- >1 year out from spinal surgery-  Today he is complaining of ongoing pain in his hip.  A couple of years ago we had given him a steroid injection.  He states that had helped but he would like to be referred now to orthopedic surgery.  He is also complaining of some headaches.  He is noticed some floaters in his eyes as such his vision is okay except for the floaters.  Has been seeing an optometrist in the past and would like to now be referred to an ophthalmologist.  He denies any weakness tingling numbness anywhere  Due to the pain in his hip he has been taking a lot of aspirin and has decided to stop his Xarelto.  He has also now no longer taking metoprolol, Zetia, Lasix and lisinopril.  He states he does not think he needs his medications.  He does have an appointment coming up to see his cardiologist next month.  I did urge him to notify the cardiologist about the 5 medications that he has stopped.  He has been watching his diet very closely-he is cut out all saturated fats, refined sugars from his diet and has lost some weight.  States that overall he feels much better    Current Outpatient Medications   Medication Instructions    aspirin 81 mg, Oral, Daily    atorvastatin (LIPITOR) 20 mg, Oral, Daily    clonazePAM (KLONOPIN) 1 mg, Oral, 3 times daily PRN    clopidogreL (PLAVIX) 75 mg, Oral, Daily    diclofenac (VOLTAREN) 75 mg, Oral, 2 times daily    ezetimibe (ZETIA) 10 mg, Daily    fluorouraciL (EFUDEX) 5 % cream  Topical (Top), 2 times daily, Apply topically.    fluticasone propionate (FLONASE) 50 mcg, Each Nostril, Daily    furosemide (LASIX) 20 mg, Daily    imiquimod (ALDARA) 5 % cream Topical (Top), Three times weekly    isosorbide mononitrate (IMDUR) 60 mg, Oral, 2 times daily    lisinopriL (PRINIVIL,ZESTRIL) 5 mg, Oral, Daily    loratadine (CLARITIN) 10 mg, Oral, Daily    metoprolol tartrate (LOPRESSOR) 25 MG tablet 1 tablet, 2 times daily    nitroGLYCERIN (NITROSTAT) 0.4 MG SL tablet SMARTSI Tablet(s) Sublingual PRN    ranolazine (RANEXA) 500 mg, Oral, 2 times daily    tadalafiL (CIALIS) 5 mg, Oral, Daily PRN    tamsulosin (FLOMAX) 0.4 mg, Oral, Daily    testosterone cypionate (DEPOTESTOTERONE CYPIONATE) 200 mg, Intramuscular, Every 14 days    traMADoL (ULTRAM) 50 mg, Oral, As needed (PRN)    XARELTO 2.5 mg Tab 1 tablet, 2 times daily       ROS:  CONSTITUTIONAL: Denies weight loss, fever and chills.  HEENT: Denies changes in vision and hearing.  ?RESPIRATORY: Denies SOB and cough.?  CV: Denies palpitations and CP. ?  GI: Denies abdominal pain, nausea, vomiting and diarrhea.?  : Denies dysuria and urinary frequency.?  MSK: Denies myalgia and joint pain.?  SKIN: Denies rash and pruritus.  ?NEUROLOGICAL: Denies headache and syncope.?  PSYCHIATRIC: Denies recent changes in mood. Denies anxiety and depression.     OBJECTIVE:       There were no vitals taken for this visit.     General: Well nourished w/o distress  HEENT: NC/AT; PERRLA; nasal and oral mucosa moist and clear; no sinus tenderness; no thyromegaly  Neck: Full ROM; no lymphadenopathy  Pulm: CTA bilaterally, normal work of breathing  CV: S1, S2 w/o murmurs or gallops; no edema noted  GI: Soft with normal bowel sounds in all quadrants, no masses on palpation  MSK: Full ROM of all extremities and spine w/o limitation or discomfort  Derm: No rashes, abnormal bruising, or skin lesions  Neuro: AAOx4; CN II-XII intact; motor/sensory function intact  Psych:  Cooperative; appropriate mood and affect       ASSESSMENT & PLAN:     CAD      July 2023- left heart catheterization with successful laser arthrectomy and balloon angioplasty to the distal RCA and right PDA.         Remains on meds as above- cruzito to see [facility] in a few days       Patient advised to discuss with Dr. Gates regarding the fact that he has stopped the 5 medications mentioned above in the HPI           2. Lumbar deg disc ds      Recovering well from spine surgery- fusion      Continuing with PT         4. Allergic Rhinitis      Stable on Claritin, Flonase     5. HTN      Currently not taking any antihypertensives-however fortunately his blood pressure is now 125/65      6. Hyperlipidemia      Atorvastatin,      7. Scalp lesions- ?actinic keratosis      F/u in Derm clinic- seen 2 weeks ago- underwent cryotherapy- on Efudex cream      Seen again in April 2024- cryotherapy      8. ?Sciatica       Tizanidine po     9. Generalized anxiety     On Klonopin    Labs today ordered  1. Degeneration of intervertebral disc of lumbar region    2. Generalized anxiety disorder    3. Allergic rhinitis, unspecified seasonality, unspecified trigger    4. CAD, multiple vessel  Overview:  CABG x4 6/1999  CABG x2 1/2010  CABG x2 5/2010 Redo SVG to R posterolateral, SVG to OM    [facility] 09/2016:  100% occlusion left main in midportion, calcification through left main, LAD, and circumflex.  Good flow to LAD and OM distal system from SV graft.  95% stenosis at level of mid RCA with plaque disruption.  PDA 80% stenosed.  Posterolateral branch occluded proximally, supplied entirely by vein graft.  Thus, distal RCA & PDA unprotected.  100% occlusion of proximal vein graft corresponding to anterior ring.  LIMA to LAD, SVG to OM, SVG to posterolateral branch patent.  Balloon angioplasty performed throuhgout mid and proximal RCA, at the level of the PDA and origin of posterolateral branch.  Stenting of PDA and RCA with ELYSIA.    [facility] 11/2017: EF  "50%.  L main 99% occluded.  Mid-RCA patent with 40% in-stent restenosis.  PDA shows 90% stenosis at bifurcation with in-stent restenosis.  Ostial posterolateral branch 95% stenosed.  L JOBY to LAD patent.  SVG to diagonal branch chronically occluded at ostium.  "Y" graft to LCX, upper arm to obtuse marginal & lower arm to L posterolateral branch with excellent flow.  Balloon angioplasty performed to posterolateral & PDA.  Ostial posterolateral branch stented followed by PDA with ELYSIA.    [facility] 7/2018: EF 40%, evidence of previous inferior MI.  Area of posterolateral/inferoapical akinesis, apex with mild hypokinesis.  Moderate MR.  L main 100% occluded.  Proximal PDA & proximal R posterolateral 99% occluded.  80% stenosis of distal RCA.  SVG to diagonal chronically occluded.  SVG to OM, "Y" graft patent.  LIMA to LAD patent.  Laser atherectomy to PDA performed followed by balloon angioplasty of PDA & R posterolateral branches.  ELYSIA placed in distal RCA.    [facility] 11/2018:  Abnormal trop level at time of procedure.  PLB & PDA bifurcation lesion ballooned.  Laser atherectomy performed to distal R to PDA and distal R to PLB branches.  Each branch was ballooned.  10% residual stenosis in PDA, 80% residual in PLB.    [facility] 3/2019: Secondary to NSTEMI.  % occlusion of distal PDA stent which gives rise to 100% occluded posterolateral artery.  Vein graft to distal PDA/posterolateral patent.  Balloon angioplasty performed on PDA, posterolateral artery w/ <10% residual stenosis in each.      5. Dyslipidemia    6. Hypertension, unspecified type  -     CBC Auto Differential; Future; Expected date: 06/27/2024  -     Comprehensive Metabolic Panel; Future; Expected date: 06/27/2024  -     Lipid Panel; Future; Expected date: 06/27/2024  -     Urinalysis; Future; Expected date: 06/27/2024  -     Vitamin D; Future; Expected date: 06/27/2024    7. History of squamous cell carcinoma of skin  Overview:  R lateral face s/p Mohs in [facility] [place]      8. Vitamin D deficiency, unspecified  -     Vitamin D; Future; Expected date: 06/27/2024    9. Anxiety             Follow up in about 6 months (around 12/27/2024).     Future Appointments   Date Time Provider Department Center   7/29/2024  8:00 AM PROCEDURE, [facility] FAMILY MEDICINE RESIDENTS [facility] FM YASMANI Quiros MD

## 2024-06-27 ENCOUNTER — OFFICE VISIT (OUTPATIENT)
Dept: INTERNAL MEDICINE | Facility: CLINIC | Age: 74
End: 2024-06-27
Payer: MEDICARE

## 2024-06-27 ENCOUNTER — LAB VISIT (OUTPATIENT)
Dept: LAB | Facility: HOSPITAL | Age: 74
End: 2024-06-27
Attending: INTERNAL MEDICINE
Payer: MEDICARE

## 2024-06-27 VITALS
OXYGEN SATURATION: 96 % | HEART RATE: 63 BPM | DIASTOLIC BLOOD PRESSURE: 71 MMHG | HEIGHT: 67 IN | TEMPERATURE: 98 F | SYSTOLIC BLOOD PRESSURE: 121 MMHG | BODY MASS INDEX: 23.94 KG/M2 | WEIGHT: 152.56 LBS | RESPIRATION RATE: 18 BRPM

## 2024-06-27 DIAGNOSIS — M51.36 DEGENERATION OF INTERVERTEBRAL DISC OF LUMBAR REGION: Primary | ICD-10-CM

## 2024-06-27 DIAGNOSIS — F41.1 GENERALIZED ANXIETY DISORDER: ICD-10-CM

## 2024-06-27 DIAGNOSIS — I10 HYPERTENSION, UNSPECIFIED TYPE: ICD-10-CM

## 2024-06-27 DIAGNOSIS — Z85.828 HISTORY OF SQUAMOUS CELL CARCINOMA OF SKIN: ICD-10-CM

## 2024-06-27 DIAGNOSIS — F41.9 ANXIETY: ICD-10-CM

## 2024-06-27 DIAGNOSIS — J30.9 ALLERGIC RHINITIS, UNSPECIFIED SEASONALITY, UNSPECIFIED TRIGGER: ICD-10-CM

## 2024-06-27 DIAGNOSIS — H43.399 VITREOUS FLOATERS, UNSPECIFIED LATERALITY: ICD-10-CM

## 2024-06-27 DIAGNOSIS — E55.9 VITAMIN D DEFICIENCY, UNSPECIFIED: ICD-10-CM

## 2024-06-27 DIAGNOSIS — E78.5 DYSLIPIDEMIA: ICD-10-CM

## 2024-06-27 DIAGNOSIS — M25.559 HIP PAIN, UNSPECIFIED LATERALITY: ICD-10-CM

## 2024-06-27 DIAGNOSIS — I25.10 CAD, MULTIPLE VESSEL: ICD-10-CM

## 2024-06-27 LAB
25(OH)D3+25(OH)D2 SERPL-MCNC: 41 NG/ML (ref 30–80)
ALBUMIN SERPL-MCNC: 3.8 G/DL (ref 3.4–4.8)
ALBUMIN/GLOB SERPL: 1.1 RATIO (ref 1.1–2)
ALP SERPL-CCNC: 78 UNIT/L (ref 40–150)
ALT SERPL-CCNC: 21 UNIT/L (ref 0–55)
ANION GAP SERPL CALC-SCNC: 6 MEQ/L
AST SERPL-CCNC: 25 UNIT/L (ref 5–34)
BASOPHILS # BLD AUTO: 0.02 X10(3)/MCL
BASOPHILS NFR BLD AUTO: 0.3 %
BILIRUB SERPL-MCNC: 0.7 MG/DL
BUN SERPL-MCNC: 20.5 MG/DL (ref 8.4–25.7)
CALCIUM SERPL-MCNC: 9.9 MG/DL (ref 8.8–10)
CHLORIDE SERPL-SCNC: 103 MMOL/L (ref 98–107)
CHOLEST SERPL-MCNC: 134 MG/DL
CHOLEST/HDLC SERPL: 3 {RATIO} (ref 0–5)
CO2 SERPL-SCNC: 30 MMOL/L (ref 23–31)
CREAT SERPL-MCNC: 0.77 MG/DL (ref 0.73–1.18)
CREAT/UREA NIT SERPL: 27
EOSINOPHIL # BLD AUTO: 0.03 X10(3)/MCL (ref 0–0.9)
EOSINOPHIL NFR BLD AUTO: 0.5 %
ERYTHROCYTE [DISTWIDTH] IN BLOOD BY AUTOMATED COUNT: 13.4 % (ref 11.5–17)
GFR SERPLBLD CREATININE-BSD FMLA CKD-EPI: >60 ML/MIN/1.73/M2
GLOBULIN SER-MCNC: 3.4 GM/DL (ref 2.4–3.5)
GLUCOSE SERPL-MCNC: 103 MG/DL (ref 82–115)
HCT VFR BLD AUTO: 46.5 % (ref 42–52)
HDLC SERPL-MCNC: 42 MG/DL (ref 35–60)
HGB BLD-MCNC: 15.8 G/DL (ref 14–18)
IMM GRANULOCYTES # BLD AUTO: 0.01 X10(3)/MCL (ref 0–0.04)
IMM GRANULOCYTES NFR BLD AUTO: 0.2 %
LDLC SERPL CALC-MCNC: 71 MG/DL (ref 50–140)
LYMPHOCYTES # BLD AUTO: 1.73 X10(3)/MCL (ref 0.6–4.6)
LYMPHOCYTES NFR BLD AUTO: 28.5 %
MCH RBC QN AUTO: 30.2 PG (ref 27–31)
MCHC RBC AUTO-ENTMCNC: 34 G/DL (ref 33–36)
MCV RBC AUTO: 88.7 FL (ref 80–94)
MONOCYTES # BLD AUTO: 0.37 X10(3)/MCL (ref 0.1–1.3)
MONOCYTES NFR BLD AUTO: 6.1 %
NEUTROPHILS # BLD AUTO: 3.9 X10(3)/MCL (ref 2.1–9.2)
NEUTROPHILS NFR BLD AUTO: 64.4 %
NRBC BLD AUTO-RTO: 0 %
PLATELET # BLD AUTO: 188 X10(3)/MCL (ref 130–400)
PMV BLD AUTO: 9.3 FL (ref 7.4–10.4)
POTASSIUM SERPL-SCNC: 4.4 MMOL/L (ref 3.5–5.1)
PROT SERPL-MCNC: 7.2 GM/DL (ref 5.8–7.6)
RBC # BLD AUTO: 5.24 X10(6)/MCL (ref 4.7–6.1)
SODIUM SERPL-SCNC: 139 MMOL/L (ref 136–145)
TRIGL SERPL-MCNC: 104 MG/DL (ref 34–140)
VLDLC SERPL CALC-MCNC: 21 MG/DL
WBC # BLD AUTO: 6.06 X10(3)/MCL (ref 4.5–11.5)

## 2024-06-27 PROCEDURE — 80061 LIPID PANEL: CPT

## 2024-06-27 PROCEDURE — 85025 COMPLETE CBC W/AUTO DIFF WBC: CPT

## 2024-06-27 PROCEDURE — 36415 COLL VENOUS BLD VENIPUNCTURE: CPT

## 2024-06-27 PROCEDURE — 80053 COMPREHEN METABOLIC PANEL: CPT

## 2024-06-27 PROCEDURE — 99215 OFFICE O/P EST HI 40 MIN: CPT | Mod: PBBFAC | Performed by: INTERNAL MEDICINE

## 2024-06-27 PROCEDURE — 82306 VITAMIN D 25 HYDROXY: CPT

## 2024-06-27 RX ORDER — CLONAZEPAM 1 MG/1
1 TABLET ORAL 3 TIMES DAILY PRN
Qty: 90 TABLET | Refills: 0 | Status: SHIPPED | OUTPATIENT
Start: 2024-06-27 | End: 2024-07-27

## 2024-06-27 RX ORDER — TRAMADOL HYDROCHLORIDE 50 MG/1
50 TABLET ORAL EVERY 12 HOURS PRN
Qty: 20 TABLET | Refills: 1 | Status: SHIPPED | OUTPATIENT
Start: 2024-06-27

## 2024-06-27 RX ORDER — NITROGLYCERIN 0.4 MG/1
0.4 TABLET SUBLINGUAL
Qty: 30 TABLET | Refills: 3 | Status: SHIPPED | OUTPATIENT
Start: 2024-06-27

## 2024-06-27 RX ORDER — FLUTICASONE PROPIONATE 50 MCG
1 SPRAY, SUSPENSION (ML) NASAL DAILY
Qty: 11.1 ML | Refills: 6 | Status: SHIPPED | OUTPATIENT
Start: 2024-06-27

## 2024-06-27 RX ORDER — ATORVASTATIN CALCIUM 20 MG/1
20 TABLET, FILM COATED ORAL DAILY
COMMUNITY
Start: 2024-01-30

## 2024-06-27 RX ORDER — RANOLAZINE 500 MG/1
500 TABLET, EXTENDED RELEASE ORAL 2 TIMES DAILY
Qty: 30 TABLET | Refills: 3 | Status: SHIPPED | OUTPATIENT
Start: 2024-06-27

## 2024-07-26 ENCOUNTER — OFFICE VISIT (OUTPATIENT)
Dept: UROLOGY | Facility: CLINIC | Age: 74
End: 2024-07-26
Payer: MEDICARE

## 2024-07-26 ENCOUNTER — TELEPHONE (OUTPATIENT)
Dept: UROLOGY | Facility: CLINIC | Age: 74
End: 2024-07-26

## 2024-07-26 ENCOUNTER — LAB VISIT (OUTPATIENT)
Dept: LAB | Facility: HOSPITAL | Age: 74
End: 2024-07-26
Attending: NURSE PRACTITIONER
Payer: MEDICARE

## 2024-07-26 VITALS
SYSTOLIC BLOOD PRESSURE: 161 MMHG | DIASTOLIC BLOOD PRESSURE: 75 MMHG | BODY MASS INDEX: 23.54 KG/M2 | WEIGHT: 150 LBS | HEART RATE: 70 BPM | HEIGHT: 67 IN | RESPIRATION RATE: 18 BRPM

## 2024-07-26 DIAGNOSIS — E29.1 HYPOGONADISM IN MALE: Primary | ICD-10-CM

## 2024-07-26 DIAGNOSIS — N40.0 BENIGN PROSTATIC HYPERPLASIA, UNSPECIFIED WHETHER LOWER URINARY TRACT SYMPTOMS PRESENT: ICD-10-CM

## 2024-07-26 DIAGNOSIS — M25.50 ARTHRALGIA, UNSPECIFIED JOINT: ICD-10-CM

## 2024-07-26 DIAGNOSIS — E29.1 HYPOGONADISM IN MALE: ICD-10-CM

## 2024-07-26 DIAGNOSIS — N28.1 RENAL CYST: ICD-10-CM

## 2024-07-26 LAB
PSA SERPL-MCNC: 0.6 NG/ML
TESTOST SERPL-MCNC: 133.01 NG/DL (ref 220.91–715.81)

## 2024-07-26 PROCEDURE — 99215 OFFICE O/P EST HI 40 MIN: CPT | Mod: PBBFAC | Performed by: NURSE PRACTITIONER

## 2024-07-26 PROCEDURE — 36415 COLL VENOUS BLD VENIPUNCTURE: CPT

## 2024-07-26 PROCEDURE — 84403 ASSAY OF TOTAL TESTOSTERONE: CPT

## 2024-07-26 PROCEDURE — 84153 ASSAY OF PSA TOTAL: CPT

## 2024-07-26 RX ORDER — TADALAFIL 5 MG/1
5 TABLET ORAL DAILY PRN
Qty: 30 TABLET | Refills: 11 | Status: SHIPPED | OUTPATIENT
Start: 2024-07-26 | End: 2025-07-26

## 2024-07-26 RX ORDER — DICLOFENAC SODIUM 75 MG/1
75 TABLET, DELAYED RELEASE ORAL 2 TIMES DAILY
Qty: 60 TABLET | Refills: 2 | Status: SHIPPED | OUTPATIENT
Start: 2024-07-26

## 2024-07-26 RX ORDER — CLOMIPHENE CITRATE 50 MG/1
50 TABLET ORAL DAILY
Qty: 30 TABLET | Refills: 2 | Status: SHIPPED | OUTPATIENT
Start: 2024-07-26 | End: 2024-08-25

## 2024-07-26 NOTE — PROGRESS NOTES
Chief Complaint:   Chief Complaint   Patient presents with    Follow-up       HPI:   Patient is a 71-year-old male here for follow-up for renal cyst, hypogonadism, BPH.  Patient seen in the past for renal cysts with follow-up renal ultrasounds which are currently stable per last ultrasound.  Patient treated in the past for hypogonadism with testosterone injections he has not had any follow-up or labs in the year.  Patient treated in the past with BPH with tamsulosin 0.4 mg p.o. daily and also for erectile dysfunction with Cialis 5 mg p.o. p.r.n..  Today patient presents with mild fatigue however he does have a good urine stream he is currently on the Cialis for that.    Allergies:  Review of patient's allergies indicates:   Allergen Reactions    Tomato Blisters       Medications:  Current Outpatient Medications   Medication Sig Dispense Refill    atorvastatin (LIPITOR) 20 MG tablet Take 20 mg by mouth once daily.      clonazePAM (KLONOPIN) 1 MG tablet Take 1 tablet (1 mg total) by mouth 3 (three) times daily as needed for Anxiety. 90 tablet 0    fluorouraciL (EFUDEX) 5 % cream Apply topically 2 (two) times daily. Apply topically. 40 g 2    fluticasone propionate (FLONASE) 50 mcg/actuation nasal spray 1 spray (50 mcg total) by Each Nostril route once daily. 11.1 mL 6    isosorbide mononitrate (IMDUR) 60 MG 24 hr tablet Take 60 mg by mouth 2 (two) times daily.      loratadine (CLARITIN) 10 mg tablet Take 10 mg by mouth once daily.      nitroGLYCERIN (NITROSTAT) 0.4 MG SL tablet Place 1 tablet (0.4 mg total) under the tongue as needed. 30 tablet 3    ranolazine (RANEXA) 500 MG Tb12 Take 1 tablet (500 mg total) by mouth 2 (two) times daily. 30 tablet 3    tadalafiL (CIALIS) 5 MG tablet Take 1 tablet (5 mg total) by mouth daily as needed for Erectile Dysfunction. 30 tablet 11    tamsulosin (FLOMAX) 0.4 mg Cap Take 1 capsule (0.4 mg total) by mouth once daily. 90 capsule 3    traMADoL (ULTRAM) 50 mg tablet Take 1 tablet  (50 mg total) by mouth every 12 (twelve) hours as needed for Pain. 20 tablet 1    aspirin 81 MG Chew Take 1 tablet (81 mg total) by mouth once daily. (Patient not taking: Reported on 10/26/2023)  0    clopidogreL (PLAVIX) 75 mg tablet Take 1 tablet (75 mg total) by mouth Daily. (Patient not taking: Reported on 1/25/2024) 30 tablet 4    diclofenac (VOLTAREN) 75 MG EC tablet Take 1 tablet (75 mg total) by mouth 2 (two) times daily. (Patient not taking: Reported on 1/25/2024) 60 tablet 2    ezetimibe (ZETIA) 10 mg tablet Take 10 mg by mouth once daily. (Patient not taking: Reported on 6/27/2024)      furosemide (LASIX) 20 MG tablet Take 20 mg by mouth once daily at 6am. (Patient not taking: Reported on 6/27/2024)      imiquimod (ALDARA) 5 % cream Apply topically 3 (three) times a week. (Patient not taking: Reported on 6/27/2024) 12 packet 2    lisinopriL (PRINIVIL,ZESTRIL) 5 MG tablet Take 1 tablet (5 mg total) by mouth once daily. (Patient not taking: Reported on 1/25/2024) 90 tablet 3    metoprolol tartrate (LOPRESSOR) 25 MG tablet Take 1 tablet by mouth 2 (two) times a day. (Patient not taking: Reported on 6/27/2024)      testosterone cypionate (DEPOTESTOTERONE CYPIONATE) 200 mg/mL injection Inject 1 mL (200 mg total) into the muscle every 14 (fourteen) days. 10 mL 1    XARELTO 2.5 mg Tab Take 1 tablet by mouth 2 (two) times daily. (Patient not taking: Reported on 6/27/2024)       Current Facility-Administered Medications   Medication Dose Route Frequency Provider Last Rate Last Admin    triamcinolone acetonide injection 40 mg  40 mg Intra-articular 1 time in Clinic/HOD Kamini Freeman NP         Facility-Administered Medications Ordered in Other Visits   Medication Dose Route Frequency Provider Last Rate Last Admin    HYDROmorphone (PF) injection 0.2 mg  0.2 mg Intravenous Q5 Min PRN Christopher Ojeda MD   0.2 mg at 11/19/22 0245    lactated ringers infusion   Intravenous Continuous Carlos, Ilyas, MD         LIDOcaine (PF) 10 mg/ml (1%) injection 10 mg  1 mL Intradermal Once Viral Gonzalez MD        ondansetron injection 4 mg  4 mg Intravenous Once PRN Christopher Ojeda MD           Review of Systems:  General: No fever, chills, fatigability, or weight loss.  Skin: No rashes, itching, or changes in color or texture of skin.  Chest: Denies BARBOZA, cyanosis, wheezing, cough, and sputum production.  Abdomen: Appetite fine. No weight loss. Denies diarrhea, abdominal pain, hematemesis, or blood in stool.  Musculoskeletal: No joint stiffness or swelling. Denies back pain.  :  Denies any urinary frequency, urinary urgency, urinary hesitancy, urinary incontinence, urinary retention, gross hematuria, nocturia..  All other review of systems negative.    PMH:  Past Medical History:   Diagnosis Date    Actinic keratoses     Acute non-ST elevation myocardial infarction (NSTEMI) 03/2019    Basal cell carcinoma (BCC) of skin of lower extremity including hip 08/14/2015    R mid-distal shin, failed therapy w/ Aldera cream, s/p electrodessication & curettage 12/2015    CAD, multiple vessel     Calculus of kidney 05/23/2022    s/p lithotripsy years ago    Empyema of left pleural space     History of excision of pilonidal cyst     Other seasonal allergic rhinitis     Severe acute respiratory syndrome coronavirus 2 (SARS-CoV-2) vaccination not indicated 05/23/2022    Problem added via discern rule [email or web address]    Simple hepatic cyst     R dome    Skin cancer        PSH:  Past Surgical History:   Procedure Laterality Date    angiogram with 12 stents      ANTERIOR LUMBAR INTERBODY FUSION (ALIF) USING COMPUTER-ASSISTED NAVIGATION N/A 11/18/2022    Procedure: FUSION, SPINE, LUMBAR, ALIF, USING COMPUTER-ASSISTED NAVIGATION;  Surgeon: Viral Gonzalez MD;  [place] [facility] [place];  Service: Neurosurgery;  Laterality: N/A;  ANTERIOR LUMBAR INTERBODY FUSION L5/S1  // RIGHT L5/S1 FORAMINOTOMIES // DRILL // SCOPE // SUPINE // SKYTRON //  LUKE TO OPEN //  DIRECT SPINE // NDM    ATHERECTOMY  06/30/2023    Procedure: Atherectomy;  Surgeon: Del Gates MD;  Location: [facility] CATH LAB;  Service: Cardiology;;    BACK SURGERY  11/18/2022    cabgx2      cabgx4      LEFT HEART CATHETERIZATION N/A 06/30/2023    Procedure: Left heart cath;  Surgeon: Del Gates MD;  Location: [facility] CATH LAB;  Service: Cardiology;  Laterality: N/A;    LITHOTRIPSY      PERCUTANEOUS TRANSLUMINAL BALLOON ANGIOPLASTY OF CORONARY ARTERY  06/30/2023    Procedure: Angioplasty-coronary;  Surgeon: Del Gates MD;  Location: [facility] CATH LAB;  Service: Cardiology;;    pilonidial cyst excision      TOTAL HIP ARTHROPLASTY Right 05/13/2014    Secondary to avascular necrosis of femoral head       FamHx:  No family history on file.    SocHx:  Social History     Socioeconomic History    Marital status:    Tobacco Use    Smoking status: Former     Types: Cigarettes    Smokeless tobacco: Never   Substance and Sexual Activity    Alcohol use: Not Currently    Drug use: Never    Sexual activity: Yes     Partners: Female     Social Determinants of Health     Transportation Needs: No Transportation Needs (3/25/2024)    PRAPARE - Transportation     Lack of Transportation (Medical): No     Lack of Transportation (Non-Medical): No       Physical Exam:  Vitals:    07/26/24 0821   BP: (!) 161/75   Pulse: 70   Resp: 18     General: A&Ox3, no apparent distress, no deformities  Neck: No masses, normal thyroid  Lungs: CTA flip, no use of accessory muscles  Heart: RRR, no arrhythmias  Abdomen: Soft, NT, ND, no masses, no hernias, no hepatosplenomegaly  Lymphatic: Neck and groin nodes negative  Skin: The skin is warm and dry. No jaundice.  Ext: No c/c/e.    GUMale: Test desc flip, no abnormalities of epididymus. Penis circumcised, with normal penile and scrotal skin. Meatus normal. Normal rectal tone, no hemorrhoids. Prostate: 2 finger breadth wide, flat, smooth, soft, nontender, no nodules or masses appreciated.  SV not palpable. Perineum and anus normal.    Labs:    Latest Reference Range & Units 06/15/17 08:30 08/12/19 13:45 04/28/20 09:45 07/15/22 10:28 04/28/23 08:30   Prostate Specific Antigen <=4.00 ng/mL 1.8 2.3 2.3 1.71 1.39       Impression:  Hypogonadism  BPH  Renal cyst    Plan:  Instructed patient to continue Cialis 5 mg p.o. daily for BPH.  Instructed patient testosterone level, PSA, renal ultrasound will notify patient results when completed.  Will notify patient of possible treatment for testosterone with Clomid if indicated.  Instructed patient on timed voiding every 2-3 hrs, double voiding, avoidance of bladder irritants such as alcohol, citrus foods, chocolate, caffeinated drinks, energy drinks, spicy foods, sugar, caffeine products, sodas. Instructed patient to avoid drinking fluids 1-2 hours prior to bedtime & void immediately before bedtime.   RTC 3 months to re-evaluate for hypogonadism with testosterone level.  Instructed patient if develops any abnormal urologic symptoms notify clinic to be re-evaluate treated or during after hours go to emergency room versus urgent here.                             F

## 2024-07-26 NOTE — PROGRESS NOTES
Patient seen by Daisha BULLARD. Pt will RTC  3 months. Pt eduction given both written and verbal.     Anesthesia Type: 1% lidocaine with epinephrine

## 2024-07-26 NOTE — TELEPHONE ENCOUNTER
Spoke to patient via telephone regarding testosterone level 133.  Instructed patient will start Clomid 50 mg p.o. daily.  Instructed patient to keep follow up appointment in 3 months with testosterone repeat testing.

## 2024-07-26 NOTE — TELEPHONE ENCOUNTER
Patient notified of testosterone 133.01 and PSAs 0.60.  Instructed patient to start Clomid 50 mg p.o. daily and keep appointment in 3 months for re-evaluation.

## 2024-07-29 ENCOUNTER — OFFICE VISIT (OUTPATIENT)
Dept: FAMILY MEDICINE | Facility: CLINIC | Age: 74
End: 2024-07-29
Payer: MEDICARE

## 2024-07-29 VITALS
SYSTOLIC BLOOD PRESSURE: 108 MMHG | OXYGEN SATURATION: 99 % | WEIGHT: 151 LBS | BODY MASS INDEX: 23.7 KG/M2 | RESPIRATION RATE: 18 BRPM | DIASTOLIC BLOOD PRESSURE: 70 MMHG | HEIGHT: 67 IN | TEMPERATURE: 99 F | HEART RATE: 70 BPM

## 2024-07-29 DIAGNOSIS — L57.0 ACTINIC KERATOSES: Primary | ICD-10-CM

## 2024-07-29 PROCEDURE — 99215 OFFICE O/P EST HI 40 MIN: CPT | Mod: PBBFAC

## 2024-07-29 PROCEDURE — 17000 DESTRUCT PREMALG LESION: CPT | Mod: PBBFAC

## 2024-07-29 PROCEDURE — 17003 DESTRUCT PREMALG LES 2-14: CPT | Mod: PBBFAC

## 2024-07-29 NOTE — PROGRESS NOTES
"[facility] Minor Surgery Note    ID:  Nikolas Gutierrez   MRN:  [id number]     7/29/2024    Chief Complaint:      History of Present Illness:  Nikolas Gutierrez is a 73 y.o. male who presents to [facility] clinic for 3 mo f/u for cryotherapy of 3 AK lesion, 2 scalp lesions and 1 lesion on bridge of nose, on 04/22/2024. Patient continues to use Efudex cream on scalp 2-3 times per week. He reports some improvement with scaly lesions on scalp. Does not use sunscreen at this time.      Review of patient's allergies indicates:   Allergen Reactions    Tomato Blisters         Review of Systems:  As per HPI      Physical Exam:  /70   Pulse 70   Temp 98.6 °F (37 °C)   Resp 18   Ht 5' 6.93" (1.7 m)   Wt 68.5 kg (151 lb)   SpO2 99%   BMI 23.70 kg/m²      General:  VSS. No acute distress.   Skin:  9 scaly patches on scalp, bilateral ears clear and previous nose bridge lesion well healed and clear                        Procedure Note:  Procedure: Cryotherapy  Performed by: Dimitris Culver MD  Supervised by: Matti Simmons MD  Consent: signed consent obtained after discussion of risks, benefits, and alternative treatments  Number of lesions: 9  Description:  Liquid nitrogen used for cryotherapy. Tip held 1 cm from lesion and sprayed in freeze-thaw cycle.   The patient tolerated the procedure well with no complications.          Assessment/Plan:    Actinic Keratosis   - see procedure note above   - Post care instructions and return precautions discussed.      Follow up in Follow up in about 3 months (around 10/29/2024) for f/u scalk AKs.    Dimitris Culver MD  [facility]      "

## 2024-08-08 DIAGNOSIS — F41.9 ANXIETY: ICD-10-CM

## 2024-08-08 RX ORDER — CLONAZEPAM 1 MG/1
1 TABLET ORAL 3 TIMES DAILY PRN
Qty: 90 TABLET | Refills: 0 | Status: SHIPPED | OUTPATIENT
Start: 2024-08-08 | End: 2024-09-07

## 2024-08-20 DIAGNOSIS — M25.50 ARTHRALGIA, UNSPECIFIED JOINT: ICD-10-CM

## 2024-08-20 DIAGNOSIS — N40.0 BENIGN PROSTATIC HYPERPLASIA, UNSPECIFIED WHETHER LOWER URINARY TRACT SYMPTOMS PRESENT: ICD-10-CM

## 2024-08-20 RX ORDER — CLOMIPHENE CITRATE 50 MG/1
50 TABLET ORAL DAILY
Qty: 30 TABLET | Refills: 2 | Status: SHIPPED | OUTPATIENT
Start: 2024-08-20 | End: 2024-08-21 | Stop reason: SDUPTHER

## 2024-08-20 RX ORDER — TADALAFIL 5 MG/1
5 TABLET ORAL DAILY PRN
Qty: 30 TABLET | Refills: 11 | Status: SHIPPED | OUTPATIENT
Start: 2024-08-20 | End: 2025-08-20

## 2024-08-20 RX ORDER — DICLOFENAC SODIUM 75 MG/1
75 TABLET, DELAYED RELEASE ORAL 2 TIMES DAILY
Qty: 60 TABLET | Refills: 2 | Status: SHIPPED | OUTPATIENT
Start: 2024-08-20 | End: 2024-08-21 | Stop reason: SDUPTHER

## 2024-08-21 DIAGNOSIS — M25.50 ARTHRALGIA, UNSPECIFIED JOINT: ICD-10-CM

## 2024-08-22 RX ORDER — DICLOFENAC SODIUM 75 MG/1
75 TABLET, DELAYED RELEASE ORAL 2 TIMES DAILY
Qty: 60 TABLET | Refills: 2 | Status: SHIPPED | OUTPATIENT
Start: 2024-08-22

## 2024-08-22 RX ORDER — CLOMIPHENE CITRATE 50 MG/1
50 TABLET ORAL DAILY
Qty: 30 TABLET | Refills: 2 | Status: SHIPPED | OUTPATIENT
Start: 2024-08-22 | End: 2024-11-20

## 2024-08-23 ENCOUNTER — HOSPITAL ENCOUNTER (OUTPATIENT)
Dept: RADIOLOGY | Facility: HOSPITAL | Age: 74
Discharge: HOME | End: 2024-08-23
Attending: NURSE PRACTITIONER
Payer: MEDICARE

## 2024-08-23 DIAGNOSIS — N28.1 RENAL CYST: ICD-10-CM

## 2024-08-23 PROCEDURE — 76775 US EXAM ABDO BACK WALL LIM: CPT | Mod: TC

## 2024-09-03 ENCOUNTER — OFFICE VISIT (OUTPATIENT)
Dept: URGENT CARE | Facility: CLINIC | Age: 74
End: 2024-09-03
Payer: MEDICARE

## 2024-09-03 ENCOUNTER — HOSPITAL ENCOUNTER (EMERGENCY)
Facility: HOSPITAL | Age: 74
Discharge: HOME | End: 2024-09-03
Attending: STUDENT IN AN ORGANIZED HEALTH CARE EDUCATION/TRAINING PROGRAM
Payer: MEDICARE

## 2024-09-03 VITALS
WEIGHT: 153 LBS | OXYGEN SATURATION: 98 % | DIASTOLIC BLOOD PRESSURE: 51 MMHG | HEART RATE: 56 BPM | TEMPERATURE: 98 F | BODY MASS INDEX: 23.19 KG/M2 | SYSTOLIC BLOOD PRESSURE: 92 MMHG | RESPIRATION RATE: 14 BRPM | HEIGHT: 68 IN

## 2024-09-03 VITALS
TEMPERATURE: 98 F | WEIGHT: 153 LBS | DIASTOLIC BLOOD PRESSURE: 59 MMHG | OXYGEN SATURATION: 97 % | HEIGHT: 68 IN | SYSTOLIC BLOOD PRESSURE: 97 MMHG | BODY MASS INDEX: 23.19 KG/M2 | HEART RATE: 71 BPM | RESPIRATION RATE: 18 BRPM

## 2024-09-03 DIAGNOSIS — S09.90XA INJURY OF HEAD, INITIAL ENCOUNTER: Primary | ICD-10-CM

## 2024-09-03 DIAGNOSIS — S00.01XA ABRASION OF SCALP, INITIAL ENCOUNTER: ICD-10-CM

## 2024-09-03 PROCEDURE — 99215 OFFICE O/P EST HI 40 MIN: CPT | Mod: PBBFAC,25 | Performed by: FAMILY MEDICINE

## 2024-09-03 PROCEDURE — 25000003 PHARM REV CODE 250: Performed by: STUDENT IN AN ORGANIZED HEALTH CARE EDUCATION/TRAINING PROGRAM

## 2024-09-03 PROCEDURE — 99284 EMERGENCY DEPT VISIT MOD MDM: CPT | Mod: 25,27

## 2024-09-03 RX ORDER — BACITRACIN ZINC 500 [USP'U]/G
1 OINTMENT TOPICAL
Status: COMPLETED | OUTPATIENT
Start: 2024-09-03 | End: 2024-09-03

## 2024-09-03 RX ORDER — ROSUVASTATIN CALCIUM 5 MG/1
5 TABLET, COATED ORAL
COMMUNITY
Start: 2024-08-12

## 2024-09-03 RX ADMIN — BACITRACIN ZINC 1 EACH: 500 OINTMENT TOPICAL at 05:09

## 2024-09-03 NOTE — PROGRESS NOTES
"Subjective:       Patient ID: Nikolas Gutierrez is a 73 y.o. male.    Vitals:  height is 5' 8" (1.727 m) and weight is 69.4 kg (153 lb). His oral temperature is 97.7 °F (36.5 °C). His blood pressure is 97/59 (abnormal) and his pulse is 71. His respiration is 18 and oxygen saturation is 97%.     Chief Complaint: Head Injury (Hit head on brick wall. )    73-year-old male well known to me fell and hit his head earlier today.  No LOC, no headache.  He is on Xarelto.    Head Injury   Pertinent negatives include no blurred vision, disorientation, headaches, memory loss, tinnitus, vomiting or weakness.         HENT:  Negative for tinnitus.    Eyes:  Negative for blurred vision.   Gastrointestinal:  Negative for vomiting.   Neurological:  Negative for headaches and disorientation.   Psychiatric/Behavioral:  Negative for disorientation.        Objective:   Physical Exam   Constitutional: He is oriented to person, place, and time.  Non-toxic appearance. He does not appear ill. No distress.   HENT:   Head: Normocephalic. Head is with abrasion (no appreciable bony step-off). Head is without raccoon's eyes and without Jose's sign.       Ears:   Right Ear: No hemotympanum.   Left Ear: No hemotympanum.   Nose: Nose normal. No rhinorrhea, sinus tenderness or nasal deformity.   Eyes: Conjunctivae and EOM are normal. Pupils are equal, round, and reactive to light. Extraocular movement intact   Neck: Neck supple.   Cardiovascular: Normal rate and normal heart sounds.   Pulmonary/Chest: Effort normal and breath sounds normal. No respiratory distress. He has no wheezes. He has no rhonchi. He has no rales. He exhibits no tenderness.   Abdominal: Normal appearance. He exhibits no distension. Soft. There is no abdominal tenderness. There is no rebound, no guarding, no left CVA tenderness and no right CVA tenderness.   Musculoskeletal: Normal range of motion.         General: Normal range of motion.      Cervical back: Normal. He " exhibits no tenderness, no bony tenderness and no deformity.      Thoracic back: Normal.      Lumbar back: Normal.   Lymphadenopathy:     He has no cervical adenopathy.   Neurological: no focal deficit. He is alert and oriented to person, place, and time. He has normal motor skills, normal sensation and intact cranial nerves (2-12). No cranial nerve deficit. Coordination normal. Gait normal. GCS eye subscore is 4. GCS verbal subscore is 5. GCS motor subscore is 6.   Skin: Skin is warm, dry, intact and not diaphoretic. Capillary refill takes less than 2 seconds.         Comments: Abrasions right forearm   Psychiatric: His behavior is normal. Mood normal.   Nursing note and vitals reviewed.        Assessment:     1. Injury of head, initial encounter          Plan:   Mr. Gutierrez is well known to me.  He is here with his wife who describes his fall.  Hit his head on a brick wall after falling from a standing position.  No LOC and no significant findings on exam, but he is on Xarelto and Plavix.  Will transport to the ER for further eval.    Injury of head, initial encounter        Please note: This chart was completed via voice to text dictation. It may contain typographical/word recognition errors. If there are any questions, please contact the provider for final clarification.

## 2024-09-03 NOTE — ED PROVIDER NOTES
Encounter Date: 9/3/2024       History     Chief Complaint   Patient presents with    Fall     Pt reports slipping, falling, and hitting head on a brick wall at about 3:00 pm today.  Pt reports being on Xarelto.     Patient presents to the emergency department after head injury.  He states he tripped and fell and struck his head off the brick wall of his home.  No loss of consciousness.  He is currently on Xarelto.  He has some mild headache no nausea or vomiting.  No focal weakness in his arms or legs.    The history is provided by the patient.     Review of patient's allergies indicates:   Allergen Reactions    Tomato Blisters     Past Medical History:   Diagnosis Date    Actinic keratoses     Acute non-ST elevation myocardial infarction (NSTEMI) 03/2019    Basal cell carcinoma (BCC) of skin of lower extremity including hip 08/14/2015    R mid-distal shin, failed therapy w/ Aldera cream, s/p electrodessication & curettage 12/2015    CAD, multiple vessel     Calculus of kidney 05/23/2022    s/p lithotripsy years ago    Empyema of left pleural space     History of excision of pilonidal cyst     Other seasonal allergic rhinitis     Severe acute respiratory syndrome coronavirus 2 (SARS-CoV-2) vaccination not indicated 05/23/2022    Problem added via discern rule sz_covid_pos_neg    Simple hepatic cyst     R dome    Skin cancer      Past Surgical History:   Procedure Laterality Date    angiogram with 12 stents      ANTERIOR LUMBAR INTERBODY FUSION (ALIF) USING COMPUTER-ASSISTED NAVIGATION N/A 11/18/2022    Procedure: FUSION, SPINE, LUMBAR, ALIF, USING COMPUTER-ASSISTED NAVIGATION;  Surgeon: Viral Gonzalez MD;  Location: [facility];  Service: Neurosurgery;  Laterality: N/A;  ANTERIOR LUMBAR INTERBODY FUSION L5/S1  // RIGHT L5/S1 FORAMINOTOMIES // DRILL // SCOPE // SUPINE // SKYTRON //  LUKE TO OPEN // DIRECT SPINE // NDM    ATHERECTOMY  06/30/2023    Procedure: Atherectomy;  Surgeon: Del Gates MD;  Location: [facility]  CATH LAB;  Service: Cardiology;;    BACK SURGERY  11/18/2022    cabgx2      cabgx4      LEFT HEART CATHETERIZATION N/A 06/30/2023    Procedure: Left heart cath;  Surgeon: Del Gates MD;  Location: [facility] CATH LAB;  Service: Cardiology;  Laterality: N/A;    LITHOTRIPSY      PERCUTANEOUS TRANSLUMINAL BALLOON ANGIOPLASTY OF CORONARY ARTERY  06/30/2023    Procedure: Angioplasty-coronary;  Surgeon: Del Gates MD;  Location: [facility] CATH LAB;  Service: Cardiology;;    pilonidial cyst excision      TOTAL HIP ARTHROPLASTY Right 05/13/2014    Secondary to avascular necrosis of femoral head     No family history on file.  Social History     Tobacco Use    Smoking status: Former     Types: Cigarettes    Smokeless tobacco: Never   Substance Use Topics    Alcohol use: Not Currently    Drug use: Never     Review of Systems   Constitutional:  Negative for chills and fever.   HENT:  Negative for congestion and sore throat.    Respiratory:  Negative for cough and shortness of breath.    Cardiovascular:  Negative for chest pain and palpitations.   Gastrointestinal:  Negative for abdominal pain and nausea.   Genitourinary:  Negative for dysuria and hematuria.   Musculoskeletal:  Negative for arthralgias and myalgias.   Neurological:  Negative for dizziness and weakness.       Physical Exam     Initial Vitals [09/03/24 1736]   BP Pulse Resp Temp SpO2   (!) 92/51 (!) 56 14 97.7 °F (36.5 °C) 98 %      MAP       --         Physical Exam    Nursing note and vitals reviewed.  Constitutional: He appears well-developed and well-nourished.   HENT:   Head: Normocephalic.   Skin tear to parietal scalp   Eyes: Conjunctivae are normal. Pupils are equal, round, and reactive to light.   Neck: Neck supple.   Normal range of motion.  Cardiovascular:  Normal rate and regular rhythm.           Pulmonary/Chest: Breath sounds normal. No respiratory distress.   Abdominal: Abdomen is soft. There is no abdominal tenderness.   Musculoskeletal:          General: No edema. Normal range of motion.      Cervical back: Normal range of motion and neck supple.     Neurological: He is alert and oriented to person, place, and time.   Skin: Skin is warm and dry.         ED Course   Procedures  Labs Reviewed - No data to display       Imaging Results              CT Cervical Spine Without Contrast (Final result)  Result time 09/03/24 18:34:47      Final result by Emery Rodriguez MD (09/03/24 18:34:47)                   Narrative:    EXAMINATION  CT CERVICAL SPINE WITHOUT CONTRAST    CLINICAL HISTORY  Neck trauma (Age >= 65y);    TECHNIQUE  Non-contrast helical-acquisition CT images of the cervical spine were obtained and multiplanar reformats accomplished by a CT technologist at a separate workstation, pushed to PACS for physician review.    COMPARISON  None available at the time of initial interpretation.    FINDINGS  Images were reviewed in bone, soft tissue, and lung windows.    Exam quality: adequate for evaluation    Vertebral segments: No displaced fracture visualized. No acute compression deformity or focal vertebral body abnormality. The C1 lateral masses are symmetrically aligned on C2.  No evidence of traumatic subluxation. Bilateral uncovertebral and facet degenerative changes are appreciated throughout the cervical column.    Disc spaces: Multilevel intervertebral narrowing is present. No acute process identified.    Curvature: No grossly abnormal curvature appreciated.    Paravertebral tissue/musculature: No prevertebral soft tissue thickening, expansile fluid, or focal contour irregularity. The paraspinal musculature and overlying subcutaneous tissues demonstrate no acute or focal lesion.    Other findings: The visualized thyroid tissue is unremarkable. Scattered vascular calcifications are present. The included upper lung zones and mediastinal vasculature are without focal abnormality. No acute or suspicious focal abnormality of the included brain and skull  base.    IMPRESSION  1. Degenerative changes, without convincing acute abnormality.  2. Chronic/incidental details discussed above.  ==========    Please note ligament, spinal cord, and/or vascular abnormalities cannot be excluded on the basis of this examination.  Additional imaging recommended if there is elevated clinical concern for high-grade soft tissue injury.    RADIATION DOSE  Automated tube current modulation, weight-based exposure dosing, and/or iterative reconstruction technique utilized to reach lowest reasonably achievable exposure rate.    DLP: 1247 mGy*cm      Electronically signed by: Emery Rodriguez  Date:    09/03/2024  Time:    18:34                                     CT Head Without Contrast (Final result)  Result time 09/03/24 18:30:30      Final result by Emery Rodriguez MD (09/03/24 18:30:30)                   Narrative:    EXAMINATION  CT HEAD WITHOUT CONTRAST    CLINICAL HISTORY  Head trauma, moderate-severe;    TECHNIQUE  Axial non-contrast CT images of the head were acquired and multiplanar reconstructions accomplished by a CT technologist at a separate workstation, pushed to PACS for physician review.    COMPARISON  None available at the time of the initial interpretation.    FINDINGS  Images were reviewed in subdural, brain, soft tissue, and bone windows.    Exam quality: Motion/streak artifact limits assessment of the posterior fossa.    Hemorrhage: No evidence of acute hyperattenuating blood products.    Parenchyma: There is diffuse bilateral supratentorial white matter hypoattenuation, typical of chronic microvascular changes. No discrete mass, mass effect, or CT evidence of acute large vascular territory insult. Gray-white differentiation is preserved.    Midline shift: None.    CSF spaces: Proportional appearance of ventricular and sulcal enlargement. No hydrocephalus. No masses or fluid collections.    Vasculature: No focally hyperdense artery. Scattered carotid siphon  calcifications are present. No abnormal densities within the dural sinuses.    Other findings: No abnormalities of the scalp or subjacent osseous structures. Mastoids are well aerated. No focal abnormality of the sella. The included facial structures are unremarkable.    IMPRESSION  1. No acute intracranial abnormality.  2. Age-related atrophy and chronic sequela of white matter microvascular disease.    RADIATION DOSE  Automated tube current modulation, weight-based exposure dosing, and/or iterative reconstruction technique utilized to reach lowest reasonably achievable exposure rate.    DLP: 1247 mGy*cm      Electronically signed by: Emery Rodriguez  Date:    09/03/2024  Time:    18:30                                     Medications   bacitracin zinc ointment 1 each (1 each Topical (Top) Given 9/3/24 1743)     Medical Decision Making  CT imaging negative for severe injuries.  Discharged.    Amount and/or Complexity of Data Reviewed  Radiology: ordered. Decision-making details documented in ED Course.    Risk  OTC drugs.                                      Clinical Impression:  Final diagnoses:  [S09.90XA] Injury of head, initial encounter (Primary)  [S00.01XA] Abrasion of scalp, initial encounter          ED Disposition Condition    Discharge Stable          ED Prescriptions    None       Follow-up Information       Follow up With Specialties Details Why Contact Info    [facility] Emergency Medicine Go to  If symptoms worsen [address] [facility] [phone]    Jennifer Quiros MD Internal Medicine Call  As needed [address] [facility] [address]  [phone]               Albert Villatoro MD  10/07/24 6759

## 2024-09-05 DIAGNOSIS — F41.9 ANXIETY: ICD-10-CM

## 2024-09-05 NOTE — TELEPHONE ENCOUNTER
----- Message from Katia Blood sent at 9/5/2024  3:21 PM CDT -----  Regarding: Dr Quiros  Caller is:  (Nikolas) Patient       Provider:Dr Quiros    Last Visit:6/27/24    Next Visit:12/19/24    Reason for Call:Pt ask that  give him a call-need refill            Preferred Phone Number: [phone]

## 2024-09-09 RX ORDER — CLONAZEPAM 1 MG/1
1 TABLET ORAL 3 TIMES DAILY PRN
Qty: 90 TABLET | Refills: 0 | Status: SHIPPED | OUTPATIENT
Start: 2024-09-09 | End: 2024-10-09

## 2024-09-18 DIAGNOSIS — N52.9 ERECTILE DYSFUNCTION, UNSPECIFIED ERECTILE DYSFUNCTION TYPE: Primary | ICD-10-CM

## 2024-09-18 RX ORDER — SILDENAFIL 50 MG/1
50 TABLET, FILM COATED ORAL DAILY PRN
Qty: 10 TABLET | Refills: 11 | Status: SHIPPED | OUTPATIENT
Start: 2024-09-18 | End: 2025-09-18

## 2024-10-21 DIAGNOSIS — F41.9 ANXIETY: ICD-10-CM

## 2024-10-21 RX ORDER — CLONAZEPAM 1 MG/1
1 TABLET ORAL 3 TIMES DAILY PRN
Qty: 90 TABLET | Refills: 0 | Status: SHIPPED | OUTPATIENT
Start: 2024-10-21 | End: 2024-11-20

## 2024-10-21 NOTE — TELEPHONE ENCOUNTER
----- Message from Katia sent at 10/21/2024  1:48 PM CDT -----  Regarding: Dr Quiros  Caller is:  (Nikolas) Patient       Provider:Jonny    Last Visit:7/25/24    Next Visit:12/19/24    Reason for Call: Need refills Clonazepam 1mg            Preferred Phone Number: [phone]

## 2024-10-23 ENCOUNTER — LAB VISIT (OUTPATIENT)
Dept: LAB | Facility: HOSPITAL | Age: 74
End: 2024-10-23
Attending: NURSE PRACTITIONER
Payer: MEDICARE

## 2024-10-23 DIAGNOSIS — E29.1 HYPOGONADISM IN MALE: ICD-10-CM

## 2024-10-23 LAB — TESTOST SERPL-MCNC: 50.59 NG/DL (ref 220.91–715.81)

## 2024-10-23 PROCEDURE — 36415 COLL VENOUS BLD VENIPUNCTURE: CPT

## 2024-10-23 PROCEDURE — 84403 ASSAY OF TOTAL TESTOSTERONE: CPT

## 2024-10-25 ENCOUNTER — OFFICE VISIT (OUTPATIENT)
Dept: UROLOGY | Facility: CLINIC | Age: 74
End: 2024-10-25
Payer: MEDICARE

## 2024-10-25 VITALS
SYSTOLIC BLOOD PRESSURE: 125 MMHG | BODY MASS INDEX: 23.25 KG/M2 | DIASTOLIC BLOOD PRESSURE: 66 MMHG | HEIGHT: 68 IN | RESPIRATION RATE: 20 BRPM | TEMPERATURE: 98 F | HEART RATE: 71 BPM | WEIGHT: 153.38 LBS | OXYGEN SATURATION: 98 %

## 2024-10-25 DIAGNOSIS — E29.1 HYPOGONADISM IN MALE: Primary | ICD-10-CM

## 2024-10-25 PROCEDURE — 99215 OFFICE O/P EST HI 40 MIN: CPT | Mod: PBBFAC | Performed by: NURSE PRACTITIONER

## 2024-10-25 RX ORDER — TESTOSTERONE CYPIONATE 200 MG/ML
200 INJECTION, SOLUTION INTRAMUSCULAR
Qty: 10 ML | Refills: 1 | Status: SHIPPED | OUTPATIENT
Start: 2024-10-25 | End: 2025-04-25

## 2024-10-25 RX ORDER — TESTOSTERONE CYPIONATE 200 MG/ML
200 INJECTION, SOLUTION INTRAMUSCULAR
Status: COMPLETED | OUTPATIENT
Start: 2024-10-25 | End: 2024-10-25

## 2024-10-25 RX ADMIN — TESTOSTERONE CYPIONATE 200 MG: 200 INJECTION, SOLUTION INTRAMUSCULAR at 09:10

## 2024-10-25 NOTE — PROGRESS NOTES
Placed in room. Seen by Codey Ashton NP. Spoke with patient. Instructed on how to give an IM injection. Given testosterone cypionate 200/mg/ml in LGM. RTC in 3 months with labs. Pt. understood instructions.

## 2024-10-25 NOTE — PROGRESS NOTES
Chief Complaint:   Chief Complaint   Patient presents with    3months f/u  hypogonadism       HPI:   Patient is a 71-year-old male here for follow-up for renal cyst, hypogonadism, BPH.  Patient seen in the past for renal cysts with follow-up renal ultrasounds which are currently stable per last ultrasound.  Patient treated with Clomid 50 mg p.o. daily.  Patient's current testosterone level 50.59  Patient treated in the past with BPH with tamsulosin 0.4 mg p.o. daily and also for erectile dysfunction with Cialis 5 mg p.o. p.r.n..      Allergies:  Review of patient's allergies indicates:   Allergen Reactions    Tomato Blisters       Medications:  Current Outpatient Medications   Medication Sig Dispense Refill    atorvastatin (LIPITOR) 20 MG tablet Take 20 mg by mouth once daily.      clomiPHENE (CLOMID) 50 mg tablet Take 1 tablet (50 mg total) by mouth once daily. 30 tablet 2    clonazePAM (KLONOPIN) 1 MG tablet Take 1 tablet (1 mg total) by mouth 3 (three) times daily as needed for Anxiety. 90 tablet 0    diclofenac (VOLTAREN) 75 MG EC tablet Take 1 tablet (75 mg total) by mouth 2 (two) times daily. 60 tablet 2    fluorouraciL (EFUDEX) 5 % cream Apply topically 2 (two) times daily. Apply topically. 40 g 2    fluticasone propionate (FLONASE) 50 mcg/actuation nasal spray 1 spray (50 mcg total) by Each Nostril route once daily. 11.1 mL 6    loratadine (CLARITIN) 10 mg tablet Take 10 mg by mouth once daily.      nitroGLYCERIN (NITROSTAT) 0.4 MG SL tablet Place 1 tablet (0.4 mg total) under the tongue as needed. 30 tablet 3    ranolazine (RANEXA) 500 MG Tb12 Take 1 tablet (500 mg total) by mouth 2 (two) times daily. 30 tablet 3    rosuvastatin (CRESTOR) 5 MG tablet Take 5 mg by mouth.      sildenafiL (VIAGRA) 50 MG tablet Take 1 tablet (50 mg total) by mouth daily as needed for Erectile Dysfunction. 10 tablet 11    tadalafiL (CIALIS) 5 MG tablet Take 1 tablet (5 mg total) by mouth daily as needed for Erectile Dysfunction.  30 tablet 11    traMADoL (ULTRAM) 50 mg tablet Take 1 tablet (50 mg total) by mouth every 12 (twelve) hours as needed for Pain. 20 tablet 1    aspirin 81 MG Chew Take 1 tablet (81 mg total) by mouth once daily. (Patient not taking: Reported on 10/26/2023)  0    clopidogreL (PLAVIX) 75 mg tablet Take 1 tablet (75 mg total) by mouth Daily. (Patient not taking: Reported on 10/25/2024) 30 tablet 4    ezetimibe (ZETIA) 10 mg tablet Take 10 mg by mouth once daily. (Patient not taking: Reported on 6/27/2024)      furosemide (LASIX) 20 MG tablet Take 20 mg by mouth once daily at 6am. (Patient not taking: Reported on 6/27/2024)      imiquimod (ALDARA) 5 % cream Apply topically 3 (three) times a week. (Patient not taking: Reported on 6/27/2024) 12 packet 2    isosorbide mononitrate (IMDUR) 60 MG 24 hr tablet Take 60 mg by mouth 2 (two) times daily.      lisinopriL (PRINIVIL,ZESTRIL) 5 MG tablet Take 1 tablet (5 mg total) by mouth once daily. (Patient not taking: Reported on 1/25/2024) 90 tablet 3    metoprolol tartrate (LOPRESSOR) 25 MG tablet Take 1 tablet by mouth 2 (two) times a day. (Patient not taking: Reported on 6/27/2024)      XARELTO 2.5 mg Tab Take 1 tablet by mouth 2 (two) times daily. (Patient not taking: Reported on 10/25/2024)       Current Facility-Administered Medications   Medication Dose Route Frequency Provider Last Rate Last Admin    triamcinolone acetonide injection 40 mg  40 mg Intra-articular 1 time in Clinic/HOD Kamini Freeman NP         Facility-Administered Medications Ordered in Other Visits   Medication Dose Route Frequency Provider Last Rate Last Admin    HYDROmorphone (PF) injection 0.2 mg  0.2 mg Intravenous Q5 Min PRN Christopher Ojeda MD   0.2 mg at 11/19/22 0245    lactated ringers infusion   Intravenous Continuous Viral Gonzalez MD        LIDOcaine (PF) 10 mg/ml (1%) injection 10 mg  1 mL Intradermal Once Viral Gonzalez MD        ondansetron injection 4 mg  4 mg Intravenous Once PRN  Christopher Ojeda MD           Review of Systems:  General: No fever, chills, fatigability, or weight loss.  Skin: No rashes, itching, or changes in color or texture of skin.  Chest: Denies BARBOZA, cyanosis, wheezing, cough, and sputum production.  Abdomen: Appetite fine. No weight loss. Denies diarrhea, abdominal pain, hematemesis, or blood in stool.  Musculoskeletal: No joint stiffness or swelling. Denies back pain.  : As above.  All other review of systems negative.    PMH:  Past Medical History:   Diagnosis Date    Actinic keratoses     Acute non-ST elevation myocardial infarction (NSTEMI) 03/2019    Basal cell carcinoma (BCC) of skin of lower extremity including hip 08/14/2015    R mid-distal shin, failed therapy w/ Aldera cream, s/p electrodessication & curettage 12/2015    CAD, multiple vessel     Calculus of kidney 05/23/2022    s/p lithotripsy years ago    Empyema of left pleural space     History of excision of pilonidal cyst     Other seasonal allergic rhinitis     Severe acute respiratory syndrome coronavirus 2 (SARS-CoV-2) vaccination not indicated 05/23/2022    Problem added via discern rule sz_covid_pos_neg    Simple hepatic cyst     R dome    Skin cancer        PSH:  Past Surgical History:   Procedure Laterality Date    angiogram with 12 stents      ANTERIOR LUMBAR INTERBODY FUSION (ALIF) USING COMPUTER-ASSISTED NAVIGATION N/A 11/18/2022    Procedure: FUSION, SPINE, LUMBAR, ALIF, USING COMPUTER-ASSISTED NAVIGATION;  Surgeon: Viral Gonzalez MD;  Location: [facility] OR;  Service: Neurosurgery;  Laterality: N/A;  ANTERIOR LUMBAR INTERBODY FUSION L5/S1  // RIGHT L5/S1 FORAMINOTOMIES // DRILL // SCOPE // SUPINE // SKYTRON //  LUKE TO OPEN // DIRECT SPINE // NDM    ATHERECTOMY  06/30/2023    Procedure: Atherectomy;  Surgeon: Del Gates MD;  Location: [facility] CATH LAB;  Service: Cardiology;;    BACK SURGERY  11/18/2022    cabgx2      cabgx4      LEFT HEART CATHETERIZATION N/A 06/30/2023    Procedure: Left  heart cath;  Surgeon: Del Gates MD;  Location: [facility] CATH LAB;  Service: Cardiology;  Laterality: N/A;    LITHOTRIPSY      PERCUTANEOUS TRANSLUMINAL BALLOON ANGIOPLASTY OF CORONARY ARTERY  06/30/2023    Procedure: Angioplasty-coronary;  Surgeon: Del Gates MD;  Location: [facility] CATH LAB;  Service: Cardiology;;    pilonidial cyst excision      TOTAL HIP ARTHROPLASTY Right 05/13/2014    Secondary to avascular necrosis of femoral head       FamHx:  No family history on file.    SocHx:  Social History     Socioeconomic History    Marital status:    Tobacco Use    Smoking status: Former     Types: Cigarettes     Passive exposure: Current    Smokeless tobacco: Never   Substance and Sexual Activity    Alcohol use: Not Currently    Drug use: Never    Sexual activity: Yes     Partners: Female     Social Drivers of Health     Transportation Needs: No Transportation Needs (3/25/2024)    PRAPARE - Transportation     Lack of Transportation (Medical): No     Lack of Transportation (Non-Medical): No       Physical Exam:  Vitals:    10/25/24 0823   BP: 125/66   Pulse: 71   Resp: 20   Temp: 97.9 °F (36.6 °C)     General: A&Ox3, no apparent distress, no deformities  Neck: No masses, normal thyroid  Lungs: CTA flip, no use of accessory muscles  Heart: RRR, no arrhythmias  Abdomen: Soft, NT, ND, no masses, no hernias, no hepatosplenomegaly  Lymphatic: Neck and groin nodes negative  Skin: The skin is warm and dry. No jaundice.  Ext: No c/c/e.          Impression:  Hypogonadism    Plan:  Instructed patient to testosterone 200 mg IM every 2 weeks.   RTC 3 months with repeat testosterone level.  Instructed patient if develops any abnormal urologic symptoms notify clinic to be re-evaluate treated or during after hours go to emergency room versus urgent here.                           F

## 2024-11-06 DIAGNOSIS — E29.1 HYPOGONADISM IN MALE: ICD-10-CM

## 2024-11-06 RX ORDER — TESTOSTERONE CYPIONATE 200 MG/ML
200 INJECTION, SOLUTION INTRAMUSCULAR
Qty: 10 ML | Refills: 1 | Status: SHIPPED | OUTPATIENT
Start: 2024-11-06 | End: 2025-05-07

## 2024-11-07 DIAGNOSIS — N52.9 ERECTILE DYSFUNCTION, UNSPECIFIED ERECTILE DYSFUNCTION TYPE: ICD-10-CM

## 2024-11-07 RX ORDER — SILDENAFIL 50 MG/1
50 TABLET, FILM COATED ORAL DAILY PRN
Qty: 10 TABLET | Refills: 11 | Status: SHIPPED | OUTPATIENT
Start: 2024-11-07 | End: 2025-11-07

## 2024-11-22 DIAGNOSIS — M54.9 BACK PAIN, UNSPECIFIED BACK LOCATION, UNSPECIFIED BACK PAIN LATERALITY, UNSPECIFIED CHRONICITY: Primary | ICD-10-CM

## 2024-11-22 DIAGNOSIS — N52.9 ERECTILE DYSFUNCTION, UNSPECIFIED ERECTILE DYSFUNCTION TYPE: ICD-10-CM

## 2024-11-22 DIAGNOSIS — N40.0 BENIGN PROSTATIC HYPERPLASIA, UNSPECIFIED WHETHER LOWER URINARY TRACT SYMPTOMS PRESENT: ICD-10-CM

## 2024-11-22 RX ORDER — LISINOPRIL 5 MG/1
5 TABLET ORAL DAILY
Qty: 90 TABLET | Refills: 3 | Status: SHIPPED | OUTPATIENT
Start: 2024-11-22 | End: 2025-11-22

## 2024-11-22 RX ORDER — TADALAFIL 5 MG/1
5 TABLET ORAL DAILY PRN
Qty: 30 TABLET | Refills: 11 | Status: SHIPPED | OUTPATIENT
Start: 2024-11-22 | End: 2025-11-22

## 2024-11-22 RX ORDER — SILDENAFIL 50 MG/1
50 TABLET, FILM COATED ORAL DAILY PRN
Qty: 10 TABLET | Refills: 11 | Status: SHIPPED | OUTPATIENT
Start: 2024-11-22 | End: 2025-11-22

## 2024-11-22 RX ORDER — TRAMADOL HYDROCHLORIDE 50 MG/1
50 TABLET ORAL EVERY 12 HOURS PRN
Qty: 20 TABLET | Refills: 1 | Status: SHIPPED | OUTPATIENT
Start: 2024-11-22

## 2024-11-22 RX ORDER — FLUTICASONE PROPIONATE 50 MCG
1 SPRAY, SUSPENSION (ML) NASAL DAILY
Qty: 11.1 ML | Refills: 6 | Status: SHIPPED | OUTPATIENT
Start: 2024-11-22

## 2024-12-03 DIAGNOSIS — I25.10 CORONARY ARTERY DISEASE, UNSPECIFIED VESSEL OR LESION TYPE, UNSPECIFIED WHETHER ANGINA PRESENT, UNSPECIFIED WHETHER NATIVE OR TRANSPLANTED HEART: ICD-10-CM

## 2024-12-03 DIAGNOSIS — F41.9 ANXIETY: ICD-10-CM

## 2024-12-04 RX ORDER — CLOMIPHENE CITRATE 50 MG/1
50 TABLET ORAL DAILY
Qty: 30 TABLET | Refills: 2 | Status: SHIPPED | OUTPATIENT
Start: 2024-12-04 | End: 2025-03-04

## 2024-12-09 DIAGNOSIS — M25.50 ARTHRALGIA, UNSPECIFIED JOINT: ICD-10-CM

## 2024-12-09 DIAGNOSIS — N40.0 BENIGN PROSTATIC HYPERPLASIA, UNSPECIFIED WHETHER LOWER URINARY TRACT SYMPTOMS PRESENT: ICD-10-CM

## 2024-12-09 DIAGNOSIS — N52.9 ERECTILE DYSFUNCTION, UNSPECIFIED ERECTILE DYSFUNCTION TYPE: ICD-10-CM

## 2024-12-09 RX ORDER — DICLOFENAC SODIUM 75 MG/1
75 TABLET, DELAYED RELEASE ORAL 2 TIMES DAILY
Qty: 60 TABLET | Refills: 2 | Status: SHIPPED | OUTPATIENT
Start: 2024-12-09

## 2024-12-09 RX ORDER — SILDENAFIL 50 MG/1
50 TABLET, FILM COATED ORAL DAILY PRN
Qty: 10 TABLET | Refills: 11 | Status: SHIPPED | OUTPATIENT
Start: 2024-12-09 | End: 2025-12-09

## 2024-12-09 RX ORDER — TADALAFIL 5 MG/1
5 TABLET ORAL DAILY PRN
Qty: 30 TABLET | Refills: 11 | Status: SHIPPED | OUTPATIENT
Start: 2024-12-09 | End: 2025-12-09

## 2024-12-11 NOTE — NURSING
Nurses Note -- 4 Eyes      11/18/2022  20:00      Skin assessed during: Admit      [x] No Pressure Injuries Present    []Prevention Measures Documented      [] Yes- Altered Skin Integrity Present or Discovered   [] LDA Added if Not in Epic (Describe Wound)   [] New Altered Skin Integrity was Present on Admit and Documented in LDA   [] Wound Image Taken    Wound Care Consulted? No    Attending Nurse:  Tricia العلي RN     Second RN/Staff Member:  Kirk Ojeda       Statement Selected

## 2024-12-17 RX ORDER — CLONAZEPAM 1 MG/1
1 TABLET ORAL 3 TIMES DAILY PRN
Qty: 90 TABLET | Refills: 0 | Status: SHIPPED | OUTPATIENT
Start: 2024-12-17 | End: 2025-01-16

## 2024-12-17 RX ORDER — CLOPIDOGREL BISULFATE 75 MG/1
75 TABLET ORAL DAILY
Qty: 30 TABLET | Refills: 4 | Status: SHIPPED | OUTPATIENT
Start: 2024-12-17

## 2024-12-17 RX ORDER — RANOLAZINE 500 MG/1
500 TABLET, EXTENDED RELEASE ORAL 2 TIMES DAILY
Qty: 30 TABLET | Refills: 3 | Status: SHIPPED | OUTPATIENT
Start: 2024-12-17

## 2024-12-19 ENCOUNTER — OFFICE VISIT (OUTPATIENT)
Dept: INTERNAL MEDICINE | Facility: CLINIC | Age: 74
End: 2024-12-19
Payer: MEDICARE

## 2024-12-19 VITALS
OXYGEN SATURATION: 97 % | TEMPERATURE: 98 F | HEIGHT: 67 IN | WEIGHT: 155.81 LBS | BODY MASS INDEX: 24.45 KG/M2 | RESPIRATION RATE: 20 BRPM | SYSTOLIC BLOOD PRESSURE: 91 MMHG | HEART RATE: 64 BPM | DIASTOLIC BLOOD PRESSURE: 42 MMHG

## 2024-12-19 DIAGNOSIS — F41.1 GENERALIZED ANXIETY DISORDER: ICD-10-CM

## 2024-12-19 DIAGNOSIS — I25.10 CAD, MULTIPLE VESSEL: ICD-10-CM

## 2024-12-19 DIAGNOSIS — E29.1 HYPOGONADISM IN MALE: ICD-10-CM

## 2024-12-19 DIAGNOSIS — I10 HYPERTENSION, UNSPECIFIED TYPE: Primary | ICD-10-CM

## 2024-12-19 DIAGNOSIS — J30.9 ALLERGIC RHINITIS, UNSPECIFIED SEASONALITY, UNSPECIFIED TRIGGER: ICD-10-CM

## 2024-12-19 PROCEDURE — 99215 OFFICE O/P EST HI 40 MIN: CPT | Mod: PBBFAC | Performed by: INTERNAL MEDICINE

## 2024-12-19 RX ORDER — TAMSULOSIN HYDROCHLORIDE 0.4 MG/1
1 CAPSULE ORAL
COMMUNITY
Start: 2024-09-13

## 2024-12-19 NOTE — PROGRESS NOTES
[facility] INTERNAL MEDICINE  OUTPATIENT OFFICE VISIT NOTE    SUBJECTIVE:      HPI: Nikolas Gutierrez is a 74 y.o. male here today for f/u  This is a 72-year-old male, who is known to Dr. Gates, with a history of CAD/CABG and PCI, HTN, HLD, venous insufficiency.   Mr. Connor is doing fair today.  Recently he lost his job.  He states that he is also undergoing some stress at home because of that his been noticing some tightening spasms his low back- this has been ongoing but manageable. He is now- >1 year out from spinal surgery-  Today he is complaining of ongoing pain in his hip.  A couple of years ago we had given him a steroid injection.  He states that had helped but he would like to be referred now to orthopedic surgery.  He is also complaining of some headaches.  He is noticed some floaters in his eyes as such his vision is okay except for the floaters.  Has been seeing an optometrist in the past and would like to now be referred to an ophthalmologist.  He denies any weakness tingling numbness anywhere  Due to the pain in his hip he has been taking a lot of aspirin and has decided to stop his Xarelto.  He has also now no longer taking metoprolol, Zetia, Lasix and lisinopril.  He states he does not think he needs his medications.  He does have an appointment coming up to see his cardiologist next month.  I did urge him to notify the cardiologist about the 5 medications that he has stopped.  He has been watching his diet very closely-he is cut out all saturated fats, refined sugars from his diet and has lost some weight.  States that overall he feels much better    Current Outpatient Medications   Medication Instructions    aspirin 81 mg, Oral, Daily    atorvastatin (LIPITOR) 20 mg, Daily    clomiPHENE (CLOMID) 50 mg, Oral, Daily    clonazePAM (KLONOPIN) 1 mg, Oral, 3 times daily PRN    clopidogreL (PLAVIX) 75 mg, Oral, Daily    diclofenac (VOLTAREN) 75 mg, Oral, 2 times daily    ezetimibe (ZETIA) 10 mg, Daily     "fluorouraciL (EFUDEX) 5 % cream Topical (Top), 2 times daily, Apply topically.    fluticasone propionate (FLONASE) 50 mcg, Each Nostril, Daily    furosemide (LASIX) 20 mg, Daily    imiquimod (ALDARA) 5 % cream Topical (Top), Three times weekly    isosorbide mononitrate (IMDUR) 60 mg, 2 times daily    lisinopriL (PRINIVIL,ZESTRIL) 5 mg, Oral, Daily    loratadine (CLARITIN) 10 mg, Daily    metoprolol tartrate (LOPRESSOR) 25 MG tablet 1 tablet, 2 times daily    nitroGLYCERIN (NITROSTAT) 0.4 mg, Sublingual, As needed (PRN)    ranolazine (RANEXA) 500 mg, Oral, 2 times daily    rosuvastatin (CRESTOR) 5 mg    sildenafiL (VIAGRA) 50 mg, Oral, Daily PRN    tadalafiL (CIALIS) 5 mg, Oral, Daily PRN    tamsulosin (FLOMAX) 0.4 mg Cap 1 capsule    testosterone cypionate (DEPOTESTOTERONE CYPIONATE) 200 mg, Intramuscular, Every 14 days    traMADoL (ULTRAM) 50 mg, Oral, Every 12 hours PRN    XARELTO 2.5 mg Tab 1 tablet, 2 times daily       ROS:  CONSTITUTIONAL: Denies weight loss, fever and chills.  HEENT: Denies changes in vision and hearing.  ?RESPIRATORY: Denies SOB and cough.?  CV: Denies palpitations and CP. ?  GI: Denies abdominal pain, nausea, vomiting and diarrhea.?  : Denies dysuria and urinary frequency.?  MSK: Denies myalgia and joint pain.?  SKIN: Denies rash and pruritus.  ?NEUROLOGICAL: Denies headache and syncope.?  PSYCHIATRIC: Denies recent changes in mood. Denies anxiety and depression.     OBJECTIVE:       Visit Vitals  BP (!) 91/42 (BP Location: Left arm, Patient Position: Sitting)   Pulse 64   Temp 98 °F (36.7 °C) (Oral)   Resp 20   Ht 5' 7" (1.702 m)   Wt 70.7 kg (155 lb 12.8 oz)   SpO2 97%   BMI 24.40 kg/m²        General: Well nourished w/o distress  HEENT: NC/AT; PERRLA; nasal and oral mucosa moist and clear; no sinus tenderness; no thyromegaly  Neck: Full ROM; no lymphadenopathy  Pulm: CTA bilaterally, normal work of breathing  CV: S1, S2 w/o murmurs or gallops; no edema noted  GI: Soft with normal bowel " sounds in all quadrants, no masses on palpation  MSK: Full ROM of all extremities and spine w/o limitation or discomfort  Derm: No rashes, abnormal bruising, or skin lesions  Neuro: AAOx4; CN II-XII intact; motor/sensory function intact  Psych: Cooperative; appropriate mood and affect       ASSESSMENT & PLAN:     CAD      July 2023- left heart catheterization with successful laser arthrectomy and balloon angioplasty to the distal RCA and right PDA.         Remains on meds as above- cruzito to see [facility] in a few days       Patient advised to discuss with Dr. Gates regarding the fact that he has stopped the 5 medications mentioned above in the HPI           2. Lumbar deg disc ds      Recovering well from spine surgery- fusion      Continuing with PT         4. Allergic Rhinitis      Stable on Claritin, Flonase     5. HTN      Currently not taking any antihypertensives-however fortunately his blood pressure is now 125/65      6. Hyperlipidemia      Atorvastatin,      7. Scalp lesions- ?actinic keratosis      F/u in Derm clinic- seen 2 weeks ago- underwent cryotherapy- on Efudex cream      Seen again in July 2024- cryotherapy      8. ?Sciatica       Tizanidine po     9. Generalized anxiety     On Klonopin    10 Hypogonadism  - Saw Urology   - testosterone 200 mg IM every 2 weeks.           Follow up in about 6 months (around 6/19/2025).     Future Appointments   Date Time Provider Department Center   12/23/2024  7:30 AM [facility] [place]    1/28/2025  7:30 AM Larry Ashton NP [facility] GENEVIEVE Quiros MD

## 2024-12-23 ENCOUNTER — ANESTHESIA EVENT (OUTPATIENT)
Dept: SURGERY | Facility: HOSPITAL | Age: 74
End: 2024-12-23
Payer: MEDICARE

## 2024-12-23 ENCOUNTER — OFFICE VISIT (OUTPATIENT)
Dept: OPHTHALMOLOGY | Facility: CLINIC | Age: 74
End: 2024-12-23
Payer: MEDICARE

## 2024-12-23 VITALS — HEIGHT: 67 IN | WEIGHT: 145 LBS | BODY MASS INDEX: 22.76 KG/M2

## 2024-12-23 DIAGNOSIS — H25.813 COMBINED FORMS OF AGE-RELATED CATARACT OF BOTH EYES: Primary | ICD-10-CM

## 2024-12-23 DIAGNOSIS — H43.399 VITREOUS FLOATERS, UNSPECIFIED LATERALITY: ICD-10-CM

## 2024-12-23 DIAGNOSIS — H53.15 VISUAL DISTORTIONS OF SHAPE AND SIZE: ICD-10-CM

## 2024-12-23 PROCEDURE — 92136 OPHTHALMIC BIOMETRY: CPT | Mod: PBBFAC,PN | Performed by: STUDENT IN AN ORGANIZED HEALTH CARE EDUCATION/TRAINING PROGRAM

## 2024-12-23 PROCEDURE — 92136 OPHTHALMIC BIOMETRY: CPT | Mod: PBBFAC,PN | Performed by: OPHTHALMOLOGY

## 2024-12-23 PROCEDURE — 92134 CPTRZ OPH DX IMG PST SGM RTA: CPT | Mod: PBBFAC,PN | Performed by: STUDENT IN AN ORGANIZED HEALTH CARE EDUCATION/TRAINING PROGRAM

## 2024-12-23 PROCEDURE — 92134 CPTRZ OPH DX IMG PST SGM RTA: CPT | Mod: PBBFAC,PN | Performed by: OPHTHALMOLOGY

## 2024-12-23 PROCEDURE — 99214 OFFICE O/P EST MOD 30 MIN: CPT | Mod: PBBFAC,PN | Performed by: STUDENT IN AN ORGANIZED HEALTH CARE EDUCATION/TRAINING PROGRAM

## 2024-12-23 RX ORDER — SODIUM CHLORIDE 0.9 % (FLUSH) 0.9 %
10 SYRINGE (ML) INJECTION
OUTPATIENT
Start: 2024-12-23

## 2024-12-23 RX ORDER — PHENYLEPH/TROPICAMIDE IN WATER 2.5 %-1 %
1 DROPS OPHTHALMIC (EYE) ONCE
Status: COMPLETED | OUTPATIENT
Start: 2024-12-23 | End: 2024-12-23

## 2024-12-23 RX ORDER — CYCLOPENTOLAT/TROPIC/PHENYLEPH 1%-1%-2.5%
1 DROPS (EA) OPHTHALMIC (EYE)
OUTPATIENT
Start: 2024-12-23

## 2024-12-23 RX ORDER — TETRACAINE HYDROCHLORIDE 5 MG/ML
1 SOLUTION OPHTHALMIC
OUTPATIENT
Start: 2024-12-23

## 2024-12-23 RX ADMIN — Medication 1 DROP: at 08:12

## 2024-12-23 NOTE — ANESTHESIA PREPROCEDURE EVALUATION
Nikolas Gutierrez is a 74 y.o. male PRESENTING FOR EXTRACTION, CATARACT, WITH IOL INSERTION (Left) with a history of   -Combined forms of age-related cataract of both eyes     Vitals:    01/07/25 0817 01/07/25 0841 01/07/25 0846   BP: 125/74 125/74 122/78   Pulse: (!) 57  (!) 58   Resp: 16  20   Temp: 36.3 °C (97.3 °F)  36.3 °C (97.3 °F)   TempSrc: Oral  Temporal   SpO2: 97%  100%       -HTN  -HLD  -CAD S/P NSTEMI, S/P MX STENTS  -S/P CABG  -VENOUS INSUFF  -H/O Laryngospasm post-extubation in OR 11/18/22  -LIVER CYST  -H/O KIDNEY STONE  -H/O SKIN CA  -CHRONIC LBP  -ANXIETY  -HYPOGONADISM/ED  -SLEEP APNEA  -FORMER SMOKER    BETA-BLOCKER: METOPROLOL   Last dose: 0530  ASA LD (CONT):   PLAVIX LD (CONT):  1.7.25  XARELTO LD (CONT):  1.6.25    New Orders for Anesthesia: NONE      Patient Active Problem List   Diagnosis    Erectile dysfunction    History of squamous cell carcinoma of skin    History of smoking    Testicular hypofunction    Actinic keratosis    Allergic rhinitis    CAD, multiple vessel    Chronic back pain    Basal cell carcinoma (BCC) of lower extremity    Kidney stone    Cataract of both eyes    Degeneration of intervertebral disc of lumbar region    Dyslipidemia    Generalized anxiety disorder    Herniated nucleus pulposus, cervical    Hypertension    Venous insufficiency of lower extremity    Osteoarthritis    Sleep apnea    Neuroforaminal stenosis of lumbosacral spine    Wears eyeglasses    Stable angina pectoris    Right flank pain    Spondylolisthesis at L5-S1 level    BMI 30.0-30.9,adult    Trigger finger of left thumb    Spondylolisthesis of lumbar region    Unstable angina       Past Surgical History:   Procedure Laterality Date    angiogram with 12 stents      ANTERIOR LUMBAR INTERBODY FUSION (ALIF) USING COMPUTER-ASSISTED NAVIGATION N/A 11/18/2022    Procedure: FUSION, SPINE, LUMBAR, ALIF, USING COMPUTER-ASSISTED NAVIGATION;  Surgeon: Viral Gonzalez MD;  Location: [facility];  Service:  Neurosurgery;  Laterality: N/A;  ANTERIOR LUMBAR INTERBODY FUSION L5/S1  // RIGHT L5/S1 FORAMINOTOMIES // DRILL // SCOPE // SUPINE // SKYTRON //  LUKE TO OPEN // DIRECT SPINE // NDM    ATHERECTOMY  06/30/2023    Procedure: Atherectomy;  Surgeon: Del Gates MD;  Location: [facility] CATH LAB;  Service: Cardiology;;    BACK SURGERY  11/18/2022    cabgx2      cabgx4      LEFT HEART CATHETERIZATION N/A 06/30/2023    Procedure: Left heart cath;  Surgeon: Del Gates MD;  Location: [facility] CATH LAB;  Service: Cardiology;  Laterality: N/A;    LITHOTRIPSY      PERCUTANEOUS TRANSLUMINAL BALLOON ANGIOPLASTY OF CORONARY ARTERY  06/30/2023    Procedure: Angioplasty-coronary;  Surgeon: Del Gates MD;  Location: [facility] CATH LAB;  Service: Cardiology;;    pilonidial cyst excision      TOTAL HIP ARTHROPLASTY Right 05/13/2014    Secondary to avascular necrosis of femoral head       Lab Results   Component Value Date    WBC 6.06 06/27/2024    HGB 15.8 06/27/2024    HCT 46.5 06/27/2024     06/27/2024       CMP  Sodium   Date Value Ref Range Status   06/27/2024 139 136 - 145 mmol/L Final     Potassium   Date Value Ref Range Status   06/27/2024 4.4 3.5 - 5.1 mmol/L Final     Chloride   Date Value Ref Range Status   06/27/2024 103 98 - 107 mmol/L Final     CO2   Date Value Ref Range Status   06/27/2024 30 23 - 31 mmol/L Final     Blood Urea Nitrogen   Date Value Ref Range Status   06/27/2024 20.5 8.4 - 25.7 mg/dL Final     Creatinine   Date Value Ref Range Status   06/27/2024 0.77 0.73 - 1.18 mg/dL Final     Calcium   Date Value Ref Range Status   06/27/2024 9.9 8.8 - 10.0 mg/dL Final     Albumin   Date Value Ref Range Status   06/27/2024 3.8 3.4 - 4.8 g/dL Final     Bilirubin Total   Date Value Ref Range Status   06/27/2024 0.7 <=1.5 mg/dL Final     ALP   Date Value Ref Range Status   06/27/2024 78 40 - 150 unit/L Final     AST   Date Value Ref Range Status   06/27/2024 25 5 - 34 unit/L Final     ALT   Date Value Ref Range  Status   06/27/2024 21 0 - 55 unit/L Final     eGFR   Date Value Ref Range Status   06/27/2024 >60 mL/min/1.73/m2 Final       Lab Results   Component Value Date    INR 1.06 06/29/2023       Lab Results   Component Value Date    APTT 29.6 07/01/2023         CARDIAC CATH 6/30/23      CARDS OV 8/12/24          Current Outpatient Medications   Medication Instructions    aspirin 81 mg, Oral, Daily    atorvastatin (LIPITOR) 20 mg, Daily    clomiPHENE (CLOMID) 50 mg, Oral, Daily    clonazePAM (KLONOPIN) 1 mg, Oral, 3 times daily PRN    clopidogreL (PLAVIX) 75 mg, Oral, Daily    diclofenac (VOLTAREN) 75 mg, Oral, 2 times daily    ezetimibe (ZETIA) 10 mg, Daily    fluorouraciL (EFUDEX) 5 % cream Topical (Top), 2 times daily, Apply topically.    fluticasone propionate (FLONASE) 50 mcg, Each Nostril, Daily    furosemide (LASIX) 20 mg, Daily    imiquimod (ALDARA) 5 % cream Topical (Top), Three times weekly    isosorbide mononitrate (IMDUR) 60 mg, 2 times daily    lisinopriL (PRINIVIL,ZESTRIL) 5 mg, Oral, Daily    loratadine (CLARITIN) 10 mg, Daily    metoprolol tartrate (LOPRESSOR) 25 MG tablet 1 tablet, 2 times daily    nitroGLYCERIN (NITROSTAT) 0.4 mg, Sublingual, As needed (PRN)    ranolazine (RANEXA) 500 mg, Oral, 2 times daily    rosuvastatin (CRESTOR) 5 mg    sildenafiL (VIAGRA) 50 mg, Oral, Daily PRN    tadalafiL (CIALIS) 5 mg, Oral, Daily PRN    tamsulosin (FLOMAX) 0.4 mg Cap 1 capsule    testosterone cypionate (DEPOTESTOTERONE CYPIONATE) 200 mg, Intramuscular, Every 14 days    traMADoL (ULTRAM) 50 mg, Oral, Every 12 hours PRN    XARELTO 2.5 mg Tab 1 tablet, 2 times daily       Past anesthesia records:               Pre-op Assessment    I have reviewed the Patient Summary Reports.     I have reviewed the Nursing Notes. I have reviewed the NPO Status.   I have reviewed the Medications.     Review of Systems  Anesthesia Hx:  No problems with previous Anesthesia   History of prior surgery of interest to airway management  or planning:          Denies Family Hx of Anesthesia complications.    Denies Personal Hx of Anesthesia complications.                    Hematology/Oncology:  Hematology Normal   Oncology Normal                                   EENT/Dental:  EENT/Dental Normal           Cardiovascular:  Cardiovascular Normal                                              Pulmonary:  Pulmonary Normal                       Renal/:  Renal/ Normal                 Hepatic/GI:  Hepatic/GI Normal                    Musculoskeletal:  Musculoskeletal Normal                Neurological:  Neurology Normal                                      Endocrine:  Endocrine Normal            Dermatological:  Skin Normal    Psych:  Psychiatric Normal                    Physical Exam  General: Well nourished, Cooperative, Alert and Oriented    Airway:  Mallampati: I / I  Mouth Opening: Normal  TM Distance: Normal  Tongue: Normal  Neck ROM: Normal ROM    Dental:  Intact        Anesthesia Plan  Type of Anesthesia, risks & benefits discussed:    Anesthesia Type: Gen Natural Airway  Intra-op Monitoring Plan: Standard ASA Monitors  Post Op Pain Control Plan: IV/PO Opioids PRN  (medical reason for not using multimodal pain management)  Induction:  IV  Informed Consent: Informed consent signed with the Patient and all parties understand the risks and agree with anesthesia plan.  All questions answered. Patient consented to blood products? No  ASA Score: 4  Day of Surgery Review of History & Physical: H&P Update referred to the surgeon/provider.    Ready For Surgery From Anesthesia Perspective.     .

## 2024-12-23 NOTE — PROGRESS NOTES
HPI    New eval, floaters, vail left lateral , ocular migrains  Last edited by Tracee Heller RN on 12/23/2024  8:07 AM.            Assessment /Plan     OCT Mac 12/23/24: PFC, no fluid OU    For exam results, see Encounter Report.    Combined forms of age-related cataract of both eyes    Vitreous floaters, unspecified laterality  -     Ambulatory referral/consult to Ophthalmology  -     tropicamide /PHENYLephrine opthalmic solution 1 drop      Combined form of age-related cataract, both eyes (OU)  - Visually significant OS > OD- proceed with LEFT eye first   - Cataract surgery recommended and expected to improve vision. Vision is not correctable by glasses or other non-surgical measures. R/B/A were discussed with the patient. These included, but are not limited to: bleeding, infection, loss of vision, loss of the eye, need for more surgery, glaucoma, retinal detachment, need for glasses post-operatively, corneal edema. The patient voiced understanding and wishes to proceed.   - Lens options were discussed with the patient including monofocal lenses and toric lenses. The risks and benefits of each were discussed, including halos, glare, and possible need for glasses. No guarantees about vision after surgery were given. The patient voiced understanding and wishes to proceed with a refractive target set at distance. The patient understands they will need correction for near post-operatively.  - MRX done: BCVA: 20/25 // 20/50  - IOP normal   - Trauma: Denies   - Guttae: no  - Phaco/iridodonesis: None  - Trypan blue: no  - Flomax use: yes  - Dilation: 8 mm  - Anticoagulant/antiplatelet use: Plavix, ASA- ok to continue   - Cooperative with exam:  Pt able to lie flat for 30 minutes, will plan to do under local  - Comorbidities: CAD, sleep apnea, HTN, dyslipidemia   - Semaglutide? No   - Medical clearance: needed by cardiology (sees Dr. Gates at [facility])  - Proceed with CEIOL left eye (OS). Lens to be used: +19 D [place]on to aim  for final targeted refraction of -0.27 diopters post-operatively  - Date of surgery: 1/07/25 with Dr. Mejia/Ernesto  - RTC: POD1, POW1, POM1    2. PVD, both eyes   - Retina flat, monitor     RTC: POD1, POW1, POM1 CEIOL OS 1/07/25

## 2024-12-24 DIAGNOSIS — N40.0 BENIGN PROSTATIC HYPERPLASIA, UNSPECIFIED WHETHER LOWER URINARY TRACT SYMPTOMS PRESENT: ICD-10-CM

## 2024-12-24 PROBLEM — H43.399 VITREOUS FLOATERS: Status: ACTIVE | Noted: 2024-12-24

## 2024-12-24 RX ORDER — TADALAFIL 5 MG/1
5 TABLET ORAL DAILY PRN
Qty: 30 TABLET | Refills: 11 | Status: SHIPPED | OUTPATIENT
Start: 2024-12-24 | End: 2024-12-25 | Stop reason: SDUPTHER

## 2024-12-25 DIAGNOSIS — N40.0 BENIGN PROSTATIC HYPERPLASIA, UNSPECIFIED WHETHER LOWER URINARY TRACT SYMPTOMS PRESENT: ICD-10-CM

## 2024-12-25 DIAGNOSIS — M54.9 BACK PAIN, UNSPECIFIED BACK LOCATION, UNSPECIFIED BACK PAIN LATERALITY, UNSPECIFIED CHRONICITY: ICD-10-CM

## 2024-12-25 RX ORDER — LISINOPRIL 5 MG/1
5 TABLET ORAL DAILY
Qty: 90 TABLET | Refills: 3 | Status: CANCELLED | OUTPATIENT
Start: 2024-12-25 | End: 2025-12-25

## 2024-12-26 RX ORDER — TADALAFIL 5 MG/1
5 TABLET ORAL DAILY PRN
Qty: 30 TABLET | Refills: 11 | Status: SHIPPED | OUTPATIENT
Start: 2024-12-26 | End: 2025-12-26

## 2024-12-27 DIAGNOSIS — N40.0 BENIGN PROSTATIC HYPERPLASIA WITHOUT LOWER URINARY TRACT SYMPTOMS: ICD-10-CM

## 2024-12-27 RX ORDER — TAMSULOSIN HYDROCHLORIDE 0.4 MG/1
1 CAPSULE ORAL
Qty: 90 CAPSULE | Refills: 3 | Status: SHIPPED | OUTPATIENT
Start: 2024-12-27

## 2025-01-01 RX ORDER — HYDROMORPHONE HYDROCHLORIDE 1 MG/ML
0.5 INJECTION, SOLUTION INTRAMUSCULAR; INTRAVENOUS; SUBCUTANEOUS EVERY 5 MIN PRN
OUTPATIENT
Start: 2025-01-01

## 2025-01-01 RX ORDER — MEPERIDINE HYDROCHLORIDE 25 MG/ML
12.5 INJECTION INTRAMUSCULAR; INTRAVENOUS; SUBCUTANEOUS ONCE
OUTPATIENT
Start: 2025-01-01 | End: 2025-01-01

## 2025-01-01 RX ORDER — IPRATROPIUM BROMIDE AND ALBUTEROL SULFATE 2.5; .5 MG/3ML; MG/3ML
3 SOLUTION RESPIRATORY (INHALATION) ONCE AS NEEDED
OUTPATIENT
Start: 2025-01-01 | End: 2036-05-30

## 2025-01-01 RX ORDER — ONDANSETRON HYDROCHLORIDE 2 MG/ML
4 INJECTION, SOLUTION INTRAVENOUS ONCE
OUTPATIENT
Start: 2025-01-01 | End: 2025-01-01

## 2025-01-01 RX ORDER — GLUCAGON 1 MG
1 KIT INJECTION
OUTPATIENT
Start: 2025-01-01

## 2025-01-01 RX ORDER — HYDROMORPHONE HYDROCHLORIDE 1 MG/ML
0.2 INJECTION, SOLUTION INTRAMUSCULAR; INTRAVENOUS; SUBCUTANEOUS EVERY 5 MIN PRN
OUTPATIENT
Start: 2025-01-01

## 2025-01-01 RX ORDER — DIPHENHYDRAMINE HYDROCHLORIDE 50 MG/ML
25 INJECTION INTRAMUSCULAR; INTRAVENOUS ONCE AS NEEDED
OUTPATIENT
Start: 2025-01-01 | End: 2036-05-30

## 2025-01-01 RX ORDER — PROCHLORPERAZINE EDISYLATE 5 MG/ML
5 INJECTION INTRAMUSCULAR; INTRAVENOUS ONCE AS NEEDED
OUTPATIENT
Start: 2025-01-01 | End: 2036-05-30

## 2025-01-02 RX ORDER — TRAMADOL HYDROCHLORIDE 50 MG/1
50 TABLET ORAL EVERY 12 HOURS PRN
Qty: 20 TABLET | Refills: 1 | Status: SHIPPED | OUTPATIENT
Start: 2025-01-02

## 2025-01-06 ENCOUNTER — PATIENT MESSAGE (OUTPATIENT)
Dept: SURGERY | Facility: HOSPITAL | Age: 75
End: 2025-01-06
Payer: MEDICARE

## 2025-01-06 ENCOUNTER — TELEPHONE (OUTPATIENT)
Dept: OPHTHALMOLOGY | Facility: CLINIC | Age: 75
End: 2025-01-06
Payer: MEDICARE

## 2025-01-06 NOTE — TELEPHONE ENCOUNTER
01/06/25  Called patient in regards to surgery tomorrow, stated that surgery will call between 3-5:00 with arrival time and further instructions, made sure patient had a ride to and from surgery, patient voiced no further questions and that surgery had called with arrival time already. Patient stated our process has been very thorough and he had no further questions.  JOSE CARLOS Anderson, COA.

## 2025-01-07 ENCOUNTER — ANESTHESIA (OUTPATIENT)
Dept: SURGERY | Facility: HOSPITAL | Age: 75
End: 2025-01-07
Payer: MEDICARE

## 2025-01-07 ENCOUNTER — HOSPITAL ENCOUNTER (OUTPATIENT)
Facility: HOSPITAL | Age: 75
Discharge: HOME | End: 2025-01-07
Attending: OPHTHALMOLOGY | Admitting: OPHTHALMOLOGY
Payer: MEDICARE

## 2025-01-07 VITALS
OXYGEN SATURATION: 97 % | DIASTOLIC BLOOD PRESSURE: 70 MMHG | SYSTOLIC BLOOD PRESSURE: 120 MMHG | RESPIRATION RATE: 20 BRPM | TEMPERATURE: 98 F | HEART RATE: 60 BPM

## 2025-01-07 DIAGNOSIS — H25.813 COMBINED FORMS OF AGE-RELATED CATARACT OF BOTH EYES: ICD-10-CM

## 2025-01-07 PROCEDURE — 63600175 PHARM REV CODE 636 W HCPCS: Performed by: NURSE ANESTHETIST, CERTIFIED REGISTERED

## 2025-01-07 PROCEDURE — 63600175 PHARM REV CODE 636 W HCPCS: Performed by: OPHTHALMOLOGY

## 2025-01-07 PROCEDURE — 36000706: Performed by: OPHTHALMOLOGY

## 2025-01-07 PROCEDURE — 25000003 PHARM REV CODE 250: Performed by: SPECIALIST

## 2025-01-07 PROCEDURE — 36000707: Performed by: OPHTHALMOLOGY

## 2025-01-07 PROCEDURE — V2632 POST CHMBR INTRAOCULAR LENS: HCPCS | Performed by: OPHTHALMOLOGY

## 2025-01-07 PROCEDURE — D9220A PRA ANESTHESIA: Mod: ,,, | Performed by: NURSE ANESTHETIST, CERTIFIED REGISTERED

## 2025-01-07 PROCEDURE — 25000003 PHARM REV CODE 250: Performed by: STUDENT IN AN ORGANIZED HEALTH CARE EDUCATION/TRAINING PROGRAM

## 2025-01-07 PROCEDURE — 27201423 OPTIME MED/SURG SUP & DEVICES STERILE SUPPLY: Performed by: OPHTHALMOLOGY

## 2025-01-07 PROCEDURE — 25000003 PHARM REV CODE 250

## 2025-01-07 PROCEDURE — 37000008 HC ANESTHESIA 1ST 15 MINUTES: Performed by: OPHTHALMOLOGY

## 2025-01-07 PROCEDURE — 71000016 HC POSTOP RECOV ADDL HR: Performed by: OPHTHALMOLOGY

## 2025-01-07 PROCEDURE — 25000003 PHARM REV CODE 250: Performed by: OPHTHALMOLOGY

## 2025-01-07 PROCEDURE — 25000003 PHARM REV CODE 250: Performed by: NURSE ANESTHETIST, CERTIFIED REGISTERED

## 2025-01-07 PROCEDURE — 37000009 HC ANESTHESIA EA ADD 15 MINS: Performed by: OPHTHALMOLOGY

## 2025-01-07 PROCEDURE — 71000015 HC POSTOP RECOV 1ST HR: Performed by: OPHTHALMOLOGY

## 2025-01-07 DEVICE — IMPLANTABLE DEVICE: Type: IMPLANTABLE DEVICE | Site: EYE | Status: FUNCTIONAL

## 2025-01-07 RX ORDER — TETRACAINE HYDROCHLORIDE 5 MG/ML
1 SOLUTION OPHTHALMIC
Status: DISCONTINUED | OUTPATIENT
Start: 2025-01-07 | End: 2025-01-07 | Stop reason: HOSPADM

## 2025-01-07 RX ORDER — MIDAZOLAM HYDROCHLORIDE 1 MG/ML
INJECTION INTRAMUSCULAR; INTRAVENOUS
Status: DISCONTINUED | OUTPATIENT
Start: 2025-01-07 | End: 2025-01-07

## 2025-01-07 RX ORDER — CYCLOPENTOLAT/TROPIC/PHENYLEPH 1%-1%-2.5%
1 DROPS (EA) OPHTHALMIC (EYE)
Status: DISCONTINUED | OUTPATIENT
Start: 2025-01-07 | End: 2025-01-07 | Stop reason: HOSPADM

## 2025-01-07 RX ORDER — EPINEPHRINE 1 MG/ML
INJECTION INTRAMUSCULAR; INTRAVENOUS; SUBCUTANEOUS
Status: DISCONTINUED | OUTPATIENT
Start: 2025-01-07 | End: 2025-01-07 | Stop reason: HOSPADM

## 2025-01-07 RX ORDER — LIDOCAINE HYDROCHLORIDE 10 MG/ML
1 INJECTION, SOLUTION EPIDURAL; INFILTRATION; INTRACAUDAL; PERINEURAL ONCE
Status: DISCONTINUED | OUTPATIENT
Start: 2025-01-07 | End: 2025-01-07 | Stop reason: HOSPADM

## 2025-01-07 RX ORDER — LIDOCAINE HYDROCHLORIDE 10 MG/ML
INJECTION, SOLUTION EPIDURAL; INFILTRATION; INTRACAUDAL; PERINEURAL
Status: DISCONTINUED | OUTPATIENT
Start: 2025-01-07 | End: 2025-01-07 | Stop reason: HOSPADM

## 2025-01-07 RX ORDER — SODIUM CHLORIDE 9 MG/ML
INJECTION, SOLUTION INTRAVENOUS CONTINUOUS
Status: DISCONTINUED | OUTPATIENT
Start: 2025-01-07 | End: 2025-01-07 | Stop reason: HOSPADM

## 2025-01-07 RX ORDER — SODIUM CHLORIDE 0.9 % (FLUSH) 0.9 %
10 SYRINGE (ML) INJECTION
Status: DISCONTINUED | OUTPATIENT
Start: 2025-01-07 | End: 2025-01-07 | Stop reason: HOSPADM

## 2025-01-07 RX ORDER — MOXIFLOXACIN 5 MG/ML
SOLUTION/ DROPS OPHTHALMIC
Status: DISCONTINUED | OUTPATIENT
Start: 2025-01-07 | End: 2025-01-07 | Stop reason: HOSPADM

## 2025-01-07 RX ORDER — DIAZEPAM 5 MG/1
10 TABLET ORAL ONCE
Status: DISCONTINUED | OUTPATIENT
Start: 2025-01-07 | End: 2025-01-07 | Stop reason: HOSPADM

## 2025-01-07 RX ORDER — PREDNISOLONE ACETATE 10 MG/ML
SUSPENSION/ DROPS OPHTHALMIC
Status: DISCONTINUED | OUTPATIENT
Start: 2025-01-07 | End: 2025-01-07 | Stop reason: HOSPADM

## 2025-01-07 RX ORDER — OXYCODONE AND ACETAMINOPHEN 5; 325 MG/1; MG/1
2 TABLET ORAL ONCE
Status: COMPLETED | OUTPATIENT
Start: 2025-01-07 | End: 2025-01-07

## 2025-01-07 RX ORDER — FLURBIPROFEN SODIUM 0.3 MG/ML
SOLUTION/ DROPS OPHTHALMIC
Status: DISCONTINUED | OUTPATIENT
Start: 2025-01-07 | End: 2025-01-07 | Stop reason: HOSPADM

## 2025-01-07 RX ADMIN — Medication 1 DROP: at 08:01

## 2025-01-07 RX ADMIN — MIDAZOLAM HYDROCHLORIDE 0.25 MG: 1 INJECTION, SOLUTION INTRAMUSCULAR; INTRAVENOUS at 09:01

## 2025-01-07 RX ADMIN — MIDAZOLAM HYDROCHLORIDE 0.25 MG: 1 INJECTION, SOLUTION INTRAMUSCULAR; INTRAVENOUS at 08:01

## 2025-01-07 RX ADMIN — SODIUM CHLORIDE: 9 INJECTION, SOLUTION INTRAVENOUS at 08:01

## 2025-01-07 RX ADMIN — TETRACAINE HYDROCHLORIDE 1 DROP: 5 SOLUTION OPHTHALMIC at 08:01

## 2025-01-07 RX ADMIN — OXYCODONE HYDROCHLORIDE AND ACETAMINOPHEN 1 TABLET: 5; 325 TABLET ORAL at 10:01

## 2025-01-07 NOTE — DISCHARGE SUMMARY
Discharge Summary  Ophthalmology Service    Admit Date: 1/7/2025     Discharge Date: 1/7/2025     Attending Physician: Prabhu Mejia MD     Discharge Physician: Sonia Orozco MD    Discharged Condition: Good    Reason for Admission: Combined forms of age-related cataract of both eyes [H25.813]     Treatments/Procedures: Procedure(s) (LRB):  EXTRACTION, CATARACT, WITH IOL INSERTION (Left) (see dictated report for details).    Hospital Course: Stable    Consults: None    Significant Diagnostic Studies: None    Disposition: Home    Patient Instructions:   - Resume same diet as prior to surgery  - Limit activity, no straining, stooping, or lifting things for the next 7 days  - Call the eye clinic for severe uncontrolled pain, redness or other concerns  - Use any shield or eye drops as directed by your surgeon  - Return to clinic for your postoperative visit 1/8/2025    Patient Instructions:   Current Discharge Medication List        CONTINUE these medications which have NOT CHANGED    Details   atorvastatin (LIPITOR) 20 MG tablet Take 20 mg by mouth once daily.      clomiPHENE (CLOMID) 50 mg tablet Take 1 tablet (50 mg total) by mouth once daily.  Qty: 30 tablet, Refills: 2    Comments: Taken for Hypogonadism      clonazePAM (KLONOPIN) 1 MG tablet Take 1 tablet (1 mg total) by mouth 3 (three) times daily as needed for Anxiety.  Qty: 90 tablet, Refills: 0    Comments: >7 days medically necessary.  Associated Diagnoses: Anxiety      isosorbide mononitrate (IMDUR) 60 MG 24 hr tablet Take 60 mg by mouth 2 (two) times daily.      lisinopriL (PRINIVIL,ZESTRIL) 5 MG tablet Take 1 tablet (5 mg total) by mouth once daily.  Qty: 90 tablet, Refills: 3    Comments: .      loratadine (CLARITIN) 10 mg tablet Take 10 mg by mouth once daily.      metoprolol tartrate (LOPRESSOR) 25 MG tablet Take 1 tablet by mouth 2 (two) times a day.      ranolazine (RANEXA) 500 MG Tb12 Take 1 tablet (500 mg total) by mouth 2 (two) times  daily.  Qty: 30 tablet, Refills: 3      sildenafiL (VIAGRA) 50 MG tablet Take 1 tablet (50 mg total) by mouth daily as needed for Erectile Dysfunction.  Qty: 10 tablet, Refills: 11    Comments: Taken for Erectile Dysfunction  Associated Diagnoses: Erectile dysfunction, unspecified erectile dysfunction type      tadalafiL (CIALIS) 5 MG tablet Take 1 tablet (5 mg total) by mouth daily as needed for Erectile Dysfunction.  Qty: 30 tablet, Refills: 11    Associated Diagnoses: Benign prostatic hyperplasia, unspecified whether lower urinary tract symptoms present      tamsulosin (FLOMAX) 0.4 mg Cap TAKE 1 CAPSULE BY MOUTH DAILY  Qty: 90 capsule, Refills: 3    Associated Diagnoses: Benign prostatic hyperplasia without lower urinary tract symptoms      testosterone cypionate (DEPOTESTOTERONE CYPIONATE) 200 mg/mL injection Inject 1 mL (200 mg total) into the muscle every 14 (fourteen) days.  Qty: 10 mL, Refills: 1    Associated Diagnoses: Hypogonadism in male      traMADoL (ULTRAM) 50 mg tablet Take 1 tablet (50 mg total) by mouth every 12 (twelve) hours as needed for Pain.  Qty: 20 tablet, Refills: 1    Associated Diagnoses: Back pain, unspecified back location, unspecified back pain laterality, unspecified chronicity      aspirin 81 MG Chew Take 1 tablet (81 mg total) by mouth once daily.  Refills: 0      clopidogreL (PLAVIX) 75 mg tablet Take 1 tablet (75 mg total) by mouth Daily.  Qty: 30 tablet, Refills: 4    Associated Diagnoses: Coronary artery disease, unspecified vessel or lesion type, unspecified whether angina present, unspecified whether native or transplanted heart      diclofenac (VOLTAREN) 75 MG EC tablet Take 1 tablet (75 mg total) by mouth 2 (two) times daily.  Qty: 60 tablet, Refills: 2    Associated Diagnoses: Arthralgia, unspecified joint      ezetimibe (ZETIA) 10 mg tablet Take 10 mg by mouth once daily.      fluorouraciL (EFUDEX) 5 % cream Apply topically 2 (two) times daily. Apply topically.  Qty: 40 g,  Refills: 2    Associated Diagnoses: Actinic keratosis      fluticasone propionate (FLONASE) 50 mcg/actuation nasal spray 1 spray (50 mcg total) by Each Nostril route once daily.  Qty: 11.1 mL, Refills: 6      furosemide (LASIX) 20 MG tablet Take 20 mg by mouth once daily at 6am.      imiquimod (ALDARA) 5 % cream Apply topically 3 (three) times a week.  Qty: 12 packet, Refills: 2    Associated Diagnoses: Actinic keratoses      nitroGLYCERIN (NITROSTAT) 0.4 MG SL tablet Place 1 tablet (0.4 mg total) under the tongue as needed.  Qty: 30 tablet, Refills: 3      rosuvastatin (CRESTOR) 5 MG tablet Take 5 mg by mouth.      XARELTO 2.5 mg Tab Take 1 tablet by mouth 2 (two) times daily.             No discharge procedures on file.

## 2025-01-07 NOTE — ANESTHESIA POSTPROCEDURE EVALUATION
Anesthesia Post Evaluation    Patient: Nikolas Gutierrez    Procedure(s) Performed: Procedure(s) (LRB):  EXTRACTION, CATARACT, WITH IOL INSERTION (Left)    Final Anesthesia Type: MAC      Patient location during evaluation: med/surg floor  Patient participation: Yes- Able to Participate  Level of consciousness: awake and alert and oriented  Post-procedure vital signs: reviewed and stable  Pain management: adequate  Airway patency: patent    PONV status at discharge: No PONV  Anesthetic complications: no      Cardiovascular status: blood pressure returned to baseline and stable  Respiratory status: unassisted, spontaneous ventilation and room air  Hydration status: euvolemic  Follow-up not needed.  Comments: Patient to bed per self              Vitals Value Taken Time   /78 01/07/25 0846   Temp 36.3 °C (97.3 °F) 01/07/25 0846   Pulse 58 01/07/25 0846   Resp 20 01/07/25 0846   SpO2 100 % 01/07/25 0846         No case tracking events are documented in the log.      Pain/Carlos Score: No data recorded

## 2025-01-07 NOTE — H&P
Pre-Operative History & Physical  Ophthalmology      SUBJECTIVE:     History of Present Illness:  Patient is a 74 y.o. male presents with cataract    MEDICATIONS:   Current Facility-Administered Medications on File Prior to Encounter   Medication Dose Route Frequency Provider Last Rate Last Admin    HYDROmorphone (PF) injection 0.2 mg  0.2 mg Intravenous Q5 Min PRN Christopher Ojeda MD   0.2 mg at 11/19/22 0245    lactated ringers infusion   Intravenous Continuous Viral Gonzalez MD        LIDOcaine (PF) 10 mg/ml (1%) injection 10 mg  1 mL Intradermal Once iVral Gonzalez MD        ondansetron injection 4 mg  4 mg Intravenous Once PRN Christopher Ojeda MD         Current Outpatient Medications on File Prior to Encounter   Medication Sig Dispense Refill    atorvastatin (LIPITOR) 20 MG tablet Take 20 mg by mouth once daily.      clomiPHENE (CLOMID) 50 mg tablet Take 1 tablet (50 mg total) by mouth once daily. 30 tablet 2    clonazePAM (KLONOPIN) 1 MG tablet Take 1 tablet (1 mg total) by mouth 3 (three) times daily as needed for Anxiety. 90 tablet 0    isosorbide mononitrate (IMDUR) 60 MG 24 hr tablet Take 60 mg by mouth 2 (two) times daily.      lisinopriL (PRINIVIL,ZESTRIL) 5 MG tablet Take 1 tablet (5 mg total) by mouth once daily. 90 tablet 3    loratadine (CLARITIN) 10 mg tablet Take 10 mg by mouth once daily.      metoprolol tartrate (LOPRESSOR) 25 MG tablet Take 1 tablet by mouth 2 (two) times a day.      ranolazine (RANEXA) 500 MG Tb12 Take 1 tablet (500 mg total) by mouth 2 (two) times daily. 30 tablet 3    sildenafiL (VIAGRA) 50 MG tablet Take 1 tablet (50 mg total) by mouth daily as needed for Erectile Dysfunction. 10 tablet 11    testosterone cypionate (DEPOTESTOTERONE CYPIONATE) 200 mg/mL injection Inject 1 mL (200 mg total) into the muscle every 14 (fourteen) days. 10 mL 1    aspirin 81 MG Chew Take 1 tablet (81 mg total) by mouth once daily. (Patient not taking: Reported on 10/26/2023)  0     clopidogreL (PLAVIX) 75 mg tablet Take 1 tablet (75 mg total) by mouth Daily. (Patient not taking: Reported on 12/23/2024) 30 tablet 4    diclofenac (VOLTAREN) 75 MG EC tablet Take 1 tablet (75 mg total) by mouth 2 (two) times daily. 60 tablet 2    ezetimibe (ZETIA) 10 mg tablet Take 10 mg by mouth once daily. (Patient not taking: Reported on 6/27/2024)      fluorouraciL (EFUDEX) 5 % cream Apply topically 2 (two) times daily. Apply topically. 40 g 2    fluticasone propionate (FLONASE) 50 mcg/actuation nasal spray 1 spray (50 mcg total) by Each Nostril route once daily. 11.1 mL 6    furosemide (LASIX) 20 MG tablet Take 20 mg by mouth once daily at 6am. (Patient not taking: Reported on 6/27/2024)      imiquimod (ALDARA) 5 % cream Apply topically 3 (three) times a week. (Patient not taking: Reported on 6/27/2024) 12 packet 2    nitroGLYCERIN (NITROSTAT) 0.4 MG SL tablet Place 1 tablet (0.4 mg total) under the tongue as needed. 30 tablet 3    rosuvastatin (CRESTOR) 5 MG tablet Take 5 mg by mouth. (Patient not taking: Reported on 12/23/2024)      XARELTO 2.5 mg Tab Take 1 tablet by mouth 2 (two) times daily. (Patient not taking: Reported on 12/23/2024)         ALLERGIES:   Review of patient's allergies indicates:   Allergen Reactions    Tomato Blisters       PAST MEDICAL HISTORY:   Past Medical History:   Diagnosis Date    Actinic keratoses     Acute non-ST elevation myocardial infarction (NSTEMI) 03/2019    Basal cell carcinoma (BCC) of skin of lower extremity including hip 08/14/2015    R mid-distal shin, failed therapy w/ Aldera cream, s/p electrodessication & curettage 12/2015    CAD, multiple vessel     Calculus of kidney 05/23/2022    s/p lithotripsy years ago    Empyema of left pleural space     History of excision of pilonidal cyst     Other seasonal allergic rhinitis     Severe acute respiratory syndrome coronavirus 2 (SARS-CoV-2) vaccination not indicated 05/23/2022    Problem added via discern rule  [email or web address]    Simple hepatic cyst     R dome    Skin cancer      PAST SURGICAL HISTORY:   Past Surgical History:   Procedure Laterality Date    angiogram with 12 stents      ANTERIOR LUMBAR INTERBODY FUSION (ALIF) USING COMPUTER-ASSISTED NAVIGATION N/A 11/18/2022    Procedure: FUSION, SPINE, LUMBAR, ALIF, USING COMPUTER-ASSISTED NAVIGATION;  Surgeon: Viral Gonzalez MD;  Location: [facility] OR;  Service: Neurosurgery;  Laterality: N/A;  ANTERIOR LUMBAR INTERBODY FUSION L5/S1  // RIGHT L5/S1 FORAMINOTOMIES // DRILL // SCOPE // SUPINE // SKYTRON //  LUKE TO OPEN // DIRECT SPINE // NDM    ATHERECTOMY  06/30/2023    Procedure: Atherectomy;  Surgeon: Del Gates MD;  Location: [facility] CATH LAB;  Service: Cardiology;;    BACK SURGERY  11/18/2022    cabgx2      cabgx4      LEFT HEART CATHETERIZATION N/A 06/30/2023    Procedure: Left heart cath;  Surgeon: Del Gates MD;  Location: [facility] CATH LAB;  Service: Cardiology;  Laterality: N/A;    LITHOTRIPSY      PERCUTANEOUS TRANSLUMINAL BALLOON ANGIOPLASTY OF CORONARY ARTERY  06/30/2023    Procedure: Angioplasty-coronary;  Surgeon: Del Gates MD;  Location: [facility] CATH LAB;  Service: Cardiology;;    pilonidial cyst excision      TOTAL HIP ARTHROPLASTY Right 05/13/2014    Secondary to avascular necrosis of femoral head     PAST FAMILY HISTORY: No family history on file.  SOCIAL HISTORY:   Social History     Tobacco Use    Smoking status: Former     Types: Cigarettes     Passive exposure: Current    Smokeless tobacco: Never   Substance Use Topics    Alcohol use: Not Currently    Drug use: Never        MENTAL STATUS: Alert    REVIEW OF SYSTEMS: denies heart or lung problems     OBJECTIVE:     Vital Signs (Most Recent)       Physical Exam:  General: NAD  HEENT: Cataract  Lungs: CTAb  Heart: RRR  Abdomen: Soft, NTND    ASSESSMENT/PLAN:     Patient is a 74 y.o. male with visually significant cataract     - Plan for cataract extraction and intraocular lens implantation in the  left eye   - Risks/benefits/alternatives of the procedure discussed with the patient   - Informed consent obtained prior to surgery and the patient voiced good understanding.    Sonia Orozco MD  [employer] Ophthalmology PGY-4

## 2025-01-07 NOTE — TRANSFER OF CARE
Anesthesia Transfer of Care Note    Patient: Nikolas Gutierrez    Procedure(s) Performed: Procedure(s) (LRB):  EXTRACTION, CATARACT, WITH IOL INSERTION (Left)    Patient location: OPS    Anesthesia Type: general    Transport from OR: Transported from OR on room air with adequate spontaneous ventilation    Post pain: adequate analgesia    Post assessment: no apparent anesthetic complications    Post vital signs: stable    Level of consciousness: awake    Nausea/Vomiting: no nausea/vomiting    Complications: none    Transfer of care protocol was followedComments: Report to Chencho WOODARD      Last vitals: 119/66 p 65 r 16 sat 98 t 36

## 2025-01-07 NOTE — OP NOTE
Operative Note  Ophthalmology Service    Date of Procedure:  1/7/2025    Surgeon:  Sonia Orozco MD    Staff Physician: Prabhu Mejia MD    Pre-Operative Diagnosis: Cataract OS    Post-Operative Diagnosis: Same as pre-operative diagnosis    Treatments/Procedures Performed:   1. Cataract extraction with phacoemulsification and posterior chamber intraocular lens placement OS    Intraoperative Findings: Combined cataract     Anesthesia: Monitored anesthesia care    Complications: None    Estimated Blood Loss: None    Implant:    Implant Name Type Inv. Item Serial No.  Lot No. LRB No. Used Action   enVista Hydrophobic Acrylic IOL +19.00   [id number] [facility] [id number] Left 1 Implanted        Indication for Procedure:   The patient had a history of painless progressive vision loss interfering with activities of daily living.  Risks, benefits, alternatives, and complications were discussed thoroughly with the patient and the patient expressed understanding and a desire to proceed with the procedure. Informed consent was obtained, signed, and witnessed, and placed in the chart prior.     Procedure in detail:  The patient was brought to the operating room and placed in supine position.  A time out was performed including patient's name, date of birth, anticipated procedure, surgical site location, and allergies.  After adequate anesthesia was achieved, the patient was prepped and draped in sterile fashion using topical Betadine.     A sideport blade was used to make a paracentesis wound. Tryptan blue followed by preservative-free lidocaine was instilled into the anterior chamber. Visocat was used to fill the anterior chamber. A 2.4 mm keratome blade was then used to make a clear corneal triplanar wound. Next a Malyugin ring was inserted. A cystotome was used to make a tear in the anterior capsular flap, which was continued around with Utrata forceps to complete a continuous curvilinear  capsulorhexis. BSS was then used for hydrodissection and hydrodelineation. The lens was noted to spin freely in the bag. The lens was then removed in a divide-and-conquer manner with the phacoemulsification handpiece. Irrigation and aspiration handpiece was then used to remove the remaining cortical material. Provisc was then used to fill the capsular bag and the lens as mentioned above was placed in the bag. The Malyugin ring was removed. The I&A was used to remove the remaining Provisc, and the wounds were hydrated with BSS. The wounds were noted to be watertight. The eye was noted to have a good physiological pressure. The lid speculum was removed under direct visualization. Topical Vigamox, Pred-Forte, and Acular eye drops were placed in the eye. The eye was then covered with a shield and patch. The patient tolerated the procedure well without complications.  The patient was brought to the recovery room in stable condition.    Sonia Orozco MD  [facility]hthalmology PGY-4

## 2025-01-07 NOTE — DISCHARGE INSTRUCTIONS
· Keep follow up appointment tomorrow at the [facility].     +++Start drops today-put 3 more sets of drops today, waiting 5 minutes between drops (SHAKE PINK TOP) EX: 12pm, 4pm, 8pm    +++Bring drops with you to clinic     ` Resume home medications unless otherwise instructed by your doctor.    · No bending, lifting, stooping or straining until cleared by MD.    · Keep patch on until follow up appointment and while asleep at home to protect your eye.    · May take Tylenol or Ibuprofen for pain or discomfort if no allergies or contraindications.      ` No driving or consuming alcohol for 24 hours after anesthesia.    · Notify MD if you experience:    · Pain that is unrelieved by over the counter medicines    · if you feel increased pressure in your eye or sharp pain in the eye    · you have excessive, colored, or thick drainage coming from eye    · you see curtain-like darkening in the eye, flashes of light, or other sudden vision changes    · if you have any nausea or vomiting    · fever above 100.4F    · The clinics number is [phone]. If it is after business hours or emergency call [phone].  Thanks for choosing [facility]! Have a smooth recovery!

## 2025-01-08 ENCOUNTER — OFFICE VISIT (OUTPATIENT)
Dept: OPHTHALMOLOGY | Facility: CLINIC | Age: 75
End: 2025-01-08
Payer: MEDICARE

## 2025-01-08 DIAGNOSIS — Z98.890 POSTOPERATIVE EYE STATE: Primary | ICD-10-CM

## 2025-01-08 PROCEDURE — 99213 OFFICE O/P EST LOW 20 MIN: CPT | Mod: PBBFAC,PN | Performed by: STUDENT IN AN ORGANIZED HEALTH CARE EDUCATION/TRAINING PROGRAM

## 2025-01-08 RX ORDER — FLUORESCEIN SODIUM AND BENOXINATE HYDROCHLORIDE 2.6; 4.4 MG/ML; MG/ML
1 SOLUTION/ DROPS OPHTHALMIC
Status: DISCONTINUED | OUTPATIENT
Start: 2025-01-08 | End: 2025-01-08

## 2025-01-08 RX ORDER — FLUORESCEIN SODIUM AND BENOXINATE HYDROCHLORIDE 2.6; 4.4 MG/ML; MG/ML
1 SOLUTION/ DROPS OPHTHALMIC
Status: COMPLETED | OUTPATIENT
Start: 2025-01-08 | End: 2025-01-08

## 2025-01-08 RX ORDER — NEOMYCIN SULFATE, POLYMYXIN B SULFATE, AND DEXAMETHASONE 3.5; 10000; 1 MG/G; [USP'U]/G; MG/G
OINTMENT OPHTHALMIC NIGHTLY
Qty: 3.5 G | Refills: 1 | Status: SHIPPED | OUTPATIENT
Start: 2025-01-08

## 2025-01-08 RX ORDER — PROPARACAINE HYDROCHLORIDE 5 MG/ML
1 SOLUTION/ DROPS OPHTHALMIC
Status: COMPLETED | OUTPATIENT
Start: 2025-01-08 | End: 2025-01-08

## 2025-01-08 RX ADMIN — FLUORESCEIN SODIUM AND BENOXINATE HYDROCHLORIDE 1 DROP: 2.6; 4.4 SOLUTION/ DROPS OPHTHALMIC at 08:01

## 2025-01-08 RX ADMIN — PROPARACAINE HYDROCHLORIDE 1 DROP: 5 SOLUTION/ DROPS OPHTHALMIC at 08:01

## 2025-01-08 NOTE — PROGRESS NOTES
Assessment /Plan     OCT Mac 12/23/24: PFC, no fluid OU    For exam results, see Encounter Report.    Postoperative eye state    Other orders  -     Discontinue: fluorescein-benoxinate 0.3-0.4% ophthalmic solution 1 drop  -     fluorescein-benoxinate 0.3-0.4% ophthalmic solution 1 drop  -     proparacaine 0.5 % ophthalmic solution 1 drop  -     fluorescein ophthalmic strip 1 each  -     neomycin-polymyxin-dexamethasone (MAXITROL) 3.5 mg/g-10,000 unit/g-0.1 % Oint; Place into the left eye every evening.  Dispense: 3.5 g; Refill: 1        Assessment/Plan:      s/p phaco/IOL OS, POD #1  - Shield removed in office, patient doing well  - IOP wnl, wound Saima neg, IOL in place  - Start:   - Vigamox QID   - Pred Forte QID   - Acular QID   - Maxitrol QHS  - Wear eye shield when sleeping/QHS for the next week  - Wear protective glasses during the day at all times  - No bending, lifting, stooping, straining or eye rubbing  - Endophthalmitis and RD precautions reviewed    RTC 1 week for POW1 CEIOL OS and possible pre-op CEIOL OD    Combined form of age-related cataract, right  - Cataract surgery recommended and expected to improve vision. Vision is not correctable by glasses or other non-surgical measures. R/B/A were discussed with the patient. These included, but are not limited to: bleeding, infection, loss of vision, loss of the eye, need for more surgery, glaucoma, retinal detachment, need for glasses post-operatively, corneal edema. The patient voiced understanding and wishes to proceed.   - Lens options were discussed with the patient including monofocal lenses and toric lenses. The risks and benefits of each were discussed, including halos, glare, and possible need for glasses. No guarantees about vision after surgery were given. The patient voiced understanding and wishes to proceed with a refractive target set at distance. The patient understands they will need correction for near post-operatively.  - MRX  done: BCVA: 20/25 // 20/50  - IOP normal   - Trauma: Denies   - Guttae: no  - Phaco/iridodonesis: None  - Trypan blue: no  - Flomax use: yes  - Dilation: 8 mm  - Anticoagulant/antiplatelet use: Plavix, ASA- ok to continue   - Cooperative with exam:  Pt able to lie flat for 30 minutes, will plan to do under local  - Comorbidities: CAD, sleep apnea, HTN, dyslipidemia   - Semaglutide? No   - Medical clearance: needed by cardiology (sees Dr. Gates at [facility])  - Proceed with CEIOL left eye (OS). Lens to be used: +19 D [id number] to aim for final targeted refraction of -0.27 diopters post-operatively  - Date of surgery: 1/07/25 with Dr. Mejia/Ernesto  - RTC: POD1, POW1, POM1    2. PVD, both eyes   - Retina flat, monitor     RTC: POD1, POW1, POM1 CEIOL OS 1/07/25

## 2025-01-09 RX ORDER — FENTANYL CITRATE 50 UG/ML
INJECTION, SOLUTION INTRAMUSCULAR; INTRAVENOUS
Status: DISCONTINUED | OUTPATIENT
Start: 2025-01-07 | End: 2025-01-09

## 2025-01-14 DIAGNOSIS — N40.0 BENIGN PROSTATIC HYPERPLASIA WITHOUT LOWER URINARY TRACT SYMPTOMS: Primary | ICD-10-CM

## 2025-01-15 ENCOUNTER — OFFICE VISIT (OUTPATIENT)
Dept: OPHTHALMOLOGY | Facility: CLINIC | Age: 75
End: 2025-01-15
Payer: MEDICARE

## 2025-01-15 VITALS — BODY MASS INDEX: 22.77 KG/M2 | HEIGHT: 67 IN | WEIGHT: 145.06 LBS

## 2025-01-15 DIAGNOSIS — Z98.890 POSTOPERATIVE EYE STATE: Primary | ICD-10-CM

## 2025-01-15 PROCEDURE — 99213 OFFICE O/P EST LOW 20 MIN: CPT | Mod: PBBFAC,PN | Performed by: STUDENT IN AN ORGANIZED HEALTH CARE EDUCATION/TRAINING PROGRAM

## 2025-01-15 NOTE — PROGRESS NOTES
HPI    RTC 1 WK POW1 CEIOL OS/Possible Pre-Op CEIOL OD  Ready to schedule OD  Last edited by Monica Anderson on 1/15/2025 12:18 PM.            Assessment /Plan     OCT Mac 12/23/24: PFC, no fluid OU    For exam results, see Encounter Report.    There are no diagnoses linked to this encounter.      Assessment/Plan:     Assessment/Plan:     1. s/p phaco/IOL OS, POW#1   - Doing well, wound Saima negative, IOP controlled, pt happy with vision  - Stop the following:   - Vigamox   - Eye shield   - Activity restrictions  - Taper Pred Forte weekly as follows: TID > BID > daily > stop  - Continue Acular (ketorolac) QID until empty  - Endophthalmitis and RD precautions reviewed    RTC 3 wks for POM1 MRx/DFE      Combined form of age-related cataract, right  - Not yet VS     2. PVD, both eyes   - Retina flat, monitor     RTC POM1 CEIOL OS

## 2025-01-31 DIAGNOSIS — F41.9 ANXIETY: ICD-10-CM

## 2025-01-31 NOTE — TELEPHONE ENCOUNTER
----- Message from Katia sent at 1/31/2025 12:45 PM CST -----  Regarding: Dr Quiros     Refill Request     Provider: Dr Quiros     Last Visit: 12/19/24     Next Visit: 3/20/25     Patient's Contact #: [phone]     Medication Name & Dose: Clonazepam 1 mg    Preferred Pharmacy: [facility]     Comment:

## 2025-02-03 RX ORDER — CLONAZEPAM 1 MG/1
1 TABLET ORAL 3 TIMES DAILY PRN
Qty: 90 TABLET | Refills: 0 | Status: SHIPPED | OUTPATIENT
Start: 2025-02-03 | End: 2025-03-05

## 2025-02-07 ENCOUNTER — LAB VISIT (OUTPATIENT)
Dept: LAB | Facility: HOSPITAL | Age: 75
End: 2025-02-07
Attending: NURSE PRACTITIONER
Payer: MEDICARE

## 2025-02-07 DIAGNOSIS — E29.1 HYPOGONADISM IN MALE: ICD-10-CM

## 2025-02-07 DIAGNOSIS — I10 HYPERTENSION, UNSPECIFIED TYPE: ICD-10-CM

## 2025-02-07 DIAGNOSIS — N40.0 BENIGN PROSTATIC HYPERPLASIA WITHOUT LOWER URINARY TRACT SYMPTOMS: ICD-10-CM

## 2025-02-07 LAB
ALBUMIN SERPL-MCNC: 3.7 G/DL (ref 3.4–4.8)
ALBUMIN/GLOB SERPL: 1.1 RATIO (ref 1.1–2)
ALP SERPL-CCNC: 64 UNIT/L (ref 40–150)
ALT SERPL-CCNC: 10 UNIT/L (ref 0–55)
ANION GAP SERPL CALC-SCNC: 4 MEQ/L
AST SERPL-CCNC: 17 UNIT/L (ref 5–34)
BASOPHILS # BLD AUTO: 0.03 X10(3)/MCL
BASOPHILS NFR BLD AUTO: 0.4 %
BILIRUB SERPL-MCNC: 0.4 MG/DL
BUN SERPL-MCNC: 31.9 MG/DL (ref 8.4–25.7)
CALCIUM SERPL-MCNC: 9 MG/DL (ref 8.8–10)
CHLORIDE SERPL-SCNC: 108 MMOL/L (ref 98–107)
CHOLEST SERPL-MCNC: 146 MG/DL
CHOLEST/HDLC SERPL: 5 {RATIO} (ref 0–5)
CO2 SERPL-SCNC: 29 MMOL/L (ref 23–31)
CREAT SERPL-MCNC: 1.34 MG/DL (ref 0.72–1.25)
CREAT/UREA NIT SERPL: 24
EOSINOPHIL # BLD AUTO: 0.04 X10(3)/MCL (ref 0–0.9)
EOSINOPHIL NFR BLD AUTO: 0.6 %
ERYTHROCYTE [DISTWIDTH] IN BLOOD BY AUTOMATED COUNT: 12.8 % (ref 11.5–17)
GFR SERPLBLD CREATININE-BSD FMLA CKD-EPI: 55 ML/MIN/1.73/M2
GLOBULIN SER-MCNC: 3.3 GM/DL (ref 2.4–3.5)
GLUCOSE SERPL-MCNC: 107 MG/DL (ref 82–115)
HCT VFR BLD AUTO: 50.3 % (ref 42–52)
HDLC SERPL-MCNC: 32 MG/DL (ref 35–60)
HGB BLD-MCNC: 16.3 G/DL (ref 14–18)
IMM GRANULOCYTES # BLD AUTO: 0.01 X10(3)/MCL (ref 0–0.04)
IMM GRANULOCYTES NFR BLD AUTO: 0.1 %
LDLC SERPL CALC-MCNC: 102 MG/DL (ref 50–140)
LYMPHOCYTES # BLD AUTO: 2.15 X10(3)/MCL (ref 0.6–4.6)
LYMPHOCYTES NFR BLD AUTO: 32.1 %
MCH RBC QN AUTO: 29.7 PG (ref 27–31)
MCHC RBC AUTO-ENTMCNC: 32.4 G/DL (ref 33–36)
MCV RBC AUTO: 91.8 FL (ref 80–94)
MONOCYTES # BLD AUTO: 0.35 X10(3)/MCL (ref 0.1–1.3)
MONOCYTES NFR BLD AUTO: 5.2 %
NEUTROPHILS # BLD AUTO: 4.12 X10(3)/MCL (ref 2.1–9.2)
NEUTROPHILS NFR BLD AUTO: 61.6 %
NRBC BLD AUTO-RTO: 0 %
PLATELET # BLD AUTO: 164 X10(3)/MCL (ref 130–400)
PMV BLD AUTO: 9.3 FL (ref 7.4–10.4)
POTASSIUM SERPL-SCNC: 5.5 MMOL/L (ref 3.5–5.1)
PROT SERPL-MCNC: 7 GM/DL (ref 5.8–7.6)
PSA SERPL-MCNC: 0.69 NG/ML
RBC # BLD AUTO: 5.48 X10(6)/MCL (ref 4.7–6.1)
SODIUM SERPL-SCNC: 141 MMOL/L (ref 136–145)
TESTOST SERPL-MCNC: 232.5 NG/DL (ref 220.91–715.81)
TRIGL SERPL-MCNC: 62 MG/DL (ref 34–140)
TSH SERPL-ACNC: 2.08 UIU/ML (ref 0.35–4.94)
VLDLC SERPL CALC-MCNC: 12 MG/DL
WBC # BLD AUTO: 6.7 X10(3)/MCL (ref 4.5–11.5)

## 2025-02-07 PROCEDURE — 84153 ASSAY OF PSA TOTAL: CPT

## 2025-02-07 PROCEDURE — 84403 ASSAY OF TOTAL TESTOSTERONE: CPT

## 2025-02-07 PROCEDURE — 80061 LIPID PANEL: CPT

## 2025-02-07 PROCEDURE — 80053 COMPREHEN METABOLIC PANEL: CPT

## 2025-02-07 PROCEDURE — 85025 COMPLETE CBC W/AUTO DIFF WBC: CPT

## 2025-02-07 PROCEDURE — 36415 COLL VENOUS BLD VENIPUNCTURE: CPT

## 2025-02-07 PROCEDURE — 84443 ASSAY THYROID STIM HORMONE: CPT

## 2025-02-11 ENCOUNTER — OFFICE VISIT (OUTPATIENT)
Dept: UROLOGY | Facility: CLINIC | Age: 75
End: 2025-02-11
Payer: MEDICARE

## 2025-02-11 VITALS
WEIGHT: 155.38 LBS | HEIGHT: 67 IN | RESPIRATION RATE: 20 BRPM | SYSTOLIC BLOOD PRESSURE: 102 MMHG | TEMPERATURE: 98 F | HEART RATE: 52 BPM | OXYGEN SATURATION: 96 % | BODY MASS INDEX: 24.39 KG/M2 | DIASTOLIC BLOOD PRESSURE: 63 MMHG

## 2025-02-11 DIAGNOSIS — E29.1 HYPOGONADISM IN MALE: Primary | ICD-10-CM

## 2025-02-11 PROCEDURE — 99215 OFFICE O/P EST HI 40 MIN: CPT | Mod: PBBFAC | Performed by: NURSE PRACTITIONER

## 2025-02-11 PROCEDURE — 99213 OFFICE O/P EST LOW 20 MIN: CPT | Mod: S$PBB,,, | Performed by: NURSE PRACTITIONER

## 2025-02-11 RX ORDER — CLOMIPHENE CITRATE 50 MG/1
50 TABLET ORAL 2 TIMES DAILY
Qty: 180 TABLET | Refills: 1 | Status: SHIPPED | OUTPATIENT
Start: 2025-02-11 | End: 2025-03-13

## 2025-02-11 RX ORDER — TADALAFIL 20 MG/1
20 TABLET ORAL
Qty: 30 TABLET | Refills: 3 | Status: SHIPPED | OUTPATIENT
Start: 2025-02-11 | End: 2026-02-11

## 2025-02-11 RX ORDER — TADALAFIL 5 MG/1
5 TABLET ORAL DAILY
Qty: 90 TABLET | Refills: 3 | Status: SHIPPED | OUTPATIENT
Start: 2025-02-11 | End: 2026-02-11

## 2025-02-11 RX ORDER — TADALAFIL 20 MG/1
20 TABLET ORAL DAILY
Qty: 90 TABLET | Refills: 3 | Status: SHIPPED | OUTPATIENT
Start: 2025-02-11 | End: 2025-02-11

## 2025-02-11 NOTE — PROGRESS NOTES
HPI    Here for 1 Month S/P Phaco with IOL OS.  - Feels as if Vision OD is two-toned.  - OS is doing well, no complaints.  - Finished drops as directed.   Last edited by Tracy Aguilera LPN on 2/12/2025 12:51 PM.            Assessment /Plan     OCT Mac 12/23/24: PFC, no fluid OU    For exam results, see Encounter Report.    Postoperative eye state    Age-related nuclear cataract of right eye          Assessment/Plan:     1. s/p phaco/IOL OS, POM#1 (1/7/25)  - Doing well, wound Saima negative, IOP controlled, pt happy with vision  - Stop the following:   - Vigamox   - Eye shield   - Activity restrictions  - Taper Pred Forte weekly as follows: TID > BID > daily > stop  - Continue Acular (ketorolac) QID until empty  - Endophthalmitis and RD precautions reviewed  - 2/12/25: AC deep and quiet, no inflammation, patient off drops, doing well. Mrx provided    2. Combined form of age-related cataract, right  - Not yet VS, VA 20/25 today  - patient bothered by yellowish hue in right eye  - Will bring patient back in 6 months for reassessment    3. PVD, both eyes   - Retina flat, monitor     RTC 6 months (possible cat eval OD), MRx

## 2025-02-11 NOTE — PROGRESS NOTES
Pt seen by ALEAH Ashton; Pt instructed to return to clinic in 3 months w/labs; Discharge paperwork given w/pt verbalizing understanding

## 2025-02-11 NOTE — PROGRESS NOTES
Chief Complaint: No chief complaint on file.      HPI:   Patient is a 74-year-old male here for follow-up for renal cyst, hypogonadism, BPH.  Patient seen in the past for renal cysts with follow-up renal ultrasounds which are currently stable per last ultrasound.  Patient treated with Clomid 50 mg p.o. daily.  Patient's current testosterone level 232, PSA 0.69.  Patient treated in the past with BPH and erectile dysfunction with Cialis 5 mg p.o. daily, Cialis 20 mg p.o. p.r.n..  Today patient denies any abnormal urologic dysuria, urinary frequency, urgency, hesitancy urinary incontinence, nocturia, gross hematuria.      Allergies:  Review of patient's allergies indicates:   Allergen Reactions    Tomato Blisters       Medications:  Current Outpatient Medications   Medication Sig Dispense Refill    aspirin 81 MG Chew Take 1 tablet (81 mg total) by mouth once daily. (Patient not taking: Reported on 10/26/2023)  0    atorvastatin (LIPITOR) 20 MG tablet Take 20 mg by mouth once daily.      clomiPHENE (CLOMID) 50 mg tablet Take 1 tablet (50 mg total) by mouth once daily. 30 tablet 2    clonazePAM (KLONOPIN) 1 MG tablet Take 1 tablet (1 mg total) by mouth 3 (three) times daily as needed for Anxiety. 90 tablet 0    clopidogreL (PLAVIX) 75 mg tablet Take 1 tablet (75 mg total) by mouth Daily. (Patient not taking: Reported on 1/15/2025) 30 tablet 4    diclofenac (VOLTAREN) 75 MG EC tablet Take 1 tablet (75 mg total) by mouth 2 (two) times daily. 60 tablet 2    ezetimibe (ZETIA) 10 mg tablet Take 10 mg by mouth once daily. (Patient not taking: Reported on 1/15/2025)      fluorouraciL (EFUDEX) 5 % cream Apply topically 2 (two) times daily. Apply topically. 40 g 2    fluticasone propionate (FLONASE) 50 mcg/actuation nasal spray 1 spray (50 mcg total) by Each Nostril route once daily. 11.1 mL 6    furosemide (LASIX) 20 MG tablet Take 20 mg by mouth once daily at 6am. (Patient not taking: Reported on 1/15/2025)      imiquimod  (ALDARA) 5 % cream Apply topically 3 (three) times a week. (Patient not taking: Reported on 6/27/2024) 12 packet 2    isosorbide mononitrate (IMDUR) 60 MG 24 hr tablet Take 60 mg by mouth 2 (two) times daily.      lisinopriL (PRINIVIL,ZESTRIL) 5 MG tablet Take 1 tablet (5 mg total) by mouth once daily. 90 tablet 3    loratadine (CLARITIN) 10 mg tablet Take 10 mg by mouth once daily.      metoprolol tartrate (LOPRESSOR) 25 MG tablet Take 1 tablet by mouth 2 (two) times a day.      neomycin-polymyxin-dexamethasone (MAXITROL) 3.5 mg/g-10,000 unit/g-0.1 % Oint Place into the left eye every evening. 3.5 g 1    nitroGLYCERIN (NITROSTAT) 0.4 MG SL tablet Place 1 tablet (0.4 mg total) under the tongue as needed. 30 tablet 3    ranolazine (RANEXA) 500 MG Tb12 Take 1 tablet (500 mg total) by mouth 2 (two) times daily. 30 tablet 3    rosuvastatin (CRESTOR) 5 MG tablet Take 5 mg by mouth. (Patient not taking: Reported on 12/19/2024)      sildenafiL (VIAGRA) 50 MG tablet Take 1 tablet (50 mg total) by mouth daily as needed for Erectile Dysfunction. 10 tablet 11    tadalafiL (CIALIS) 5 MG tablet Take 1 tablet (5 mg total) by mouth daily as needed for Erectile Dysfunction. 30 tablet 11    tamsulosin (FLOMAX) 0.4 mg Cap TAKE 1 CAPSULE BY MOUTH DAILY 90 capsule 3    testosterone cypionate (DEPOTESTOTERONE CYPIONATE) 200 mg/mL injection Inject 1 mL (200 mg total) into the muscle every 14 (fourteen) days. 10 mL 1    traMADoL (ULTRAM) 50 mg tablet Take 1 tablet (50 mg total) by mouth every 12 (twelve) hours as needed for Pain. 20 tablet 1    XARELTO 2.5 mg Tab Take 1 tablet by mouth 2 (two) times daily. (Patient not taking: Reported on 6/27/2024)       Current Facility-Administered Medications   Medication Dose Route Frequency Provider Last Rate Last Admin    triamcinolone acetonide injection 40 mg  40 mg Intra-articular 1 time in Clinic/HOD Kamini Freeman NP         Facility-Administered Medications Ordered in Other Visits    Medication Dose Route Frequency Provider Last Rate Last Admin    HYDROmorphone (PF) injection 0.2 mg  0.2 mg Intravenous Q5 Min PRN Christopher Ojeda MD   0.2 mg at 11/19/22 0245    lactated ringers infusion   Intravenous Continuous Viral Gonzalez MD        LIDOcaine (PF) 10 mg/ml (1%) injection 10 mg  1 mL Intradermal Once Viral Gonzalez MD        ondansetron injection 4 mg  4 mg Intravenous Once PRN Christopher Ojeda MD           Review of Systems:  General: No fever, chills, fatigability, or weight loss.  Skin: No rashes, itching, or changes in color or texture of skin.  Chest: Denies BARBOZA, cyanosis, wheezing, cough, and sputum production.  Abdomen: Appetite fine. No weight loss. Denies diarrhea, abdominal pain, hematemesis, or blood in stool.  Musculoskeletal: No joint stiffness or swelling. Denies back pain.  : As above.  All other review of systems negative.    PMH:  Past Medical History:   Diagnosis Date    Actinic keratoses     Acute non-ST elevation myocardial infarction (NSTEMI) 03/2019    Basal cell carcinoma (BCC) of skin of lower extremity including hip 08/14/2015    R mid-distal shin, failed therapy w/ Aldera cream, s/p electrodessication & curettage 12/2015    CAD, multiple vessel     Calculus of kidney 05/23/2022    s/p lithotripsy years ago    Empyema of left pleural space     History of excision of pilonidal cyst     Other seasonal allergic rhinitis     Severe acute respiratory syndrome coronavirus 2 (SARS-CoV-2) vaccination not indicated 05/23/2022    Problem added via discern rule [email or web address]_pos_[email or web address]    Simple hepatic cyst     R dome    Skin cancer        PSH:  Past Surgical History:   Procedure Laterality Date    angiogram with 12 stents      ANTERIOR LUMBAR INTERBODY FUSION (ALIF) USING COMPUTER-ASSISTED NAVIGATION N/A 11/18/2022    Procedure: FUSION, SPINE, LUMBAR, ALIF, USING COMPUTER-ASSISTED NAVIGATION;  Surgeon: Viral Gonzalez MD;  [place] [facility] [place];  Service: Neurosurgery;  Laterality:  N/A;  ANTERIOR LUMBAR INTERBODY FUSION L5/S1  // RIGHT L5/S1 FORAMINOTOMIES // DRILL // SCOPE // SUPINE // SKYTRON //  LUKE TO OPEN // DIRECT SPINE // NDM    ATHERECTOMY  06/30/2023    Procedure: Atherectomy;  Surgeon: Del Gates MD;  Location: [facility] CATH LAB;  Service: Cardiology;;    BACK SURGERY  11/18/2022    cabgx2      cabgx4      CATARACT EXTRACTION W/  INTRAOCULAR LENS IMPLANT Left 1/7/2025    Procedure: EXTRACTION, CATARACT, WITH IOL INSERTION;  Surgeon: Prabhu Mejia MD;  Location: [facility] OR;  Service: Ophthalmology;  Laterality: Left;    LEFT HEART CATHETERIZATION N/A 06/30/2023    Procedure: Left heart cath;  Surgeon: Del Gates MD;  Location: [facility] CATH LAB;  Service: Cardiology;  Laterality: N/A;    LITHOTRIPSY      PERCUTANEOUS TRANSLUMINAL BALLOON ANGIOPLASTY OF CORONARY ARTERY  06/30/2023    Procedure: Angioplasty-coronary;  Surgeon: Del Gates MD;  Location: [facility] CATH LAB;  Service: Cardiology;;    pilonidial cyst excision      TOTAL HIP ARTHROPLASTY Right 05/13/2014    Secondary to avascular necrosis of femoral head       FamHx:  No family history on file.    SocHx:  Social History     Socioeconomic History    Marital status:    Tobacco Use    Smoking status: Former     Types: Cigarettes     Passive exposure: Current    Smokeless tobacco: Never   Substance and Sexual Activity    Alcohol use: Not Currently    Drug use: Never    Sexual activity: Yes     Partners: Female     Social Drivers of Health     Transportation Needs: No Transportation Needs (3/25/2024)    PRAPARE - Transportation     Lack of Transportation (Medical): No     Lack of Transportation (Non-Medical): No       Physical Exam:  There were no vitals filed for this visit.  General: A&Ox3, no apparent distress, no deformities  Neck: No masses, normal thyroid  Lungs: CTA flip, no use of accessory muscles  Heart: RRR, no arrhythmias  Abdomen: Soft, NT, ND, no masses, no hernias, no hepatosplenomegaly  Lymphatic:  Neck and groin nodes negative  Skin: The skin is warm and dry. No jaundice.  Ext: No c/c/e.    GUMale: Test desc flip, no abnormalities of epididymus. Penis circumcised, with normal penile and scrotal skin. Meatus normal. Normal rectal tone, no hemorrhoids. Prostate:1-1/2 finger breadth wide, flat, smooth, soft, nontender, no nodules or masses appreciated. SV not palpable. Perineum and anus normal.    Impression:  Hypogonadism  Erectile dysfunction    Plan:  Instructed patient to increase Clomid 100 mg p.o. daily, and continue Cialis 5 mg p.o. daily and Cialis 20 mg p.o. p.r.n..  Instructed patient on timed voiding every 2-3 hrs, double voiding, avoidance of bladder irritants such as alcohol, citrus foods, chocolate, caffeinated drinks, energy drinks, spicy foods, sugar, caffeine products, sodas. Instructed patient to avoid drinking fluids 1-2 hours prior to bedtime & void immediately before bedtime.   RTC 3 months with repeat testosterone level.  Instructed patient if develops any abnormal urologic symptoms notify clinic to be re-evaluate treated or during after hours go to emergency room versus urgent here.                           [facility]

## 2025-02-12 ENCOUNTER — OFFICE VISIT (OUTPATIENT)
Dept: OPHTHALMOLOGY | Facility: CLINIC | Age: 75
End: 2025-02-12
Payer: MEDICARE

## 2025-02-12 VITALS — BODY MASS INDEX: 23.56 KG/M2 | HEIGHT: 68 IN | WEIGHT: 155.44 LBS

## 2025-02-12 DIAGNOSIS — Z98.890 POSTOPERATIVE EYE STATE: Primary | ICD-10-CM

## 2025-02-12 DIAGNOSIS — H25.11 AGE-RELATED NUCLEAR CATARACT OF RIGHT EYE: ICD-10-CM

## 2025-02-12 PROCEDURE — 99213 OFFICE O/P EST LOW 20 MIN: CPT | Mod: PBBFAC,PN

## 2025-02-14 ENCOUNTER — LAB VISIT (OUTPATIENT)
Dept: LAB | Facility: HOSPITAL | Age: 75
End: 2025-02-14
Attending: NURSE PRACTITIONER
Payer: MEDICARE

## 2025-02-14 DIAGNOSIS — E29.1 HYPOGONADISM IN MALE: ICD-10-CM

## 2025-02-14 LAB — TESTOST SERPL-MCNC: 961.16 NG/DL (ref 220.91–715.81)

## 2025-02-14 PROCEDURE — 36415 COLL VENOUS BLD VENIPUNCTURE: CPT

## 2025-02-14 PROCEDURE — 84403 ASSAY OF TOTAL TESTOSTERONE: CPT

## 2025-03-20 DIAGNOSIS — F41.9 ANXIETY: ICD-10-CM

## 2025-03-20 NOTE — TELEPHONE ENCOUNTER
----- Message from Katia sent at 3/20/2025 12:09 PM CDT -----  Regarding: Dr Quiros  Caller is:  (Nikolas) Patient   Provider:Dr Rubalcava Visit: 3/20/25Next Visit:4/3/25Reason for Call: need refill on his pin  Preferred Phone Number: [phone]

## 2025-03-24 RX ORDER — CLONAZEPAM 1 MG/1
1 TABLET ORAL 3 TIMES DAILY PRN
Qty: 90 TABLET | Refills: 0 | Status: SHIPPED | OUTPATIENT
Start: 2025-03-24 | End: 2025-04-23

## 2025-04-10 ENCOUNTER — OFFICE VISIT (OUTPATIENT)
Dept: INTERNAL MEDICINE | Facility: CLINIC | Age: 75
End: 2025-04-10
Payer: MEDICARE

## 2025-04-10 VITALS
BODY MASS INDEX: 23.49 KG/M2 | HEIGHT: 68 IN | WEIGHT: 155 LBS | SYSTOLIC BLOOD PRESSURE: 102 MMHG | HEART RATE: 74 BPM | RESPIRATION RATE: 20 BRPM | OXYGEN SATURATION: 96 % | TEMPERATURE: 98 F | DIASTOLIC BLOOD PRESSURE: 67 MMHG

## 2025-04-10 DIAGNOSIS — E78.5 DYSLIPIDEMIA: ICD-10-CM

## 2025-04-10 DIAGNOSIS — I10 HYPERTENSION, UNSPECIFIED TYPE: ICD-10-CM

## 2025-04-10 DIAGNOSIS — F41.1 GENERALIZED ANXIETY DISORDER: ICD-10-CM

## 2025-04-10 DIAGNOSIS — M51.369 DEGENERATION OF INTERVERTEBRAL DISC OF LUMBAR REGION, UNSPECIFIED WHETHER PAIN PRESENT: Primary | ICD-10-CM

## 2025-04-10 PROCEDURE — 99214 OFFICE O/P EST MOD 30 MIN: CPT | Mod: PBBFAC | Performed by: INTERNAL MEDICINE

## 2025-04-10 RX ORDER — METOPROLOL TARTRATE 25 MG/1
25 TABLET, FILM COATED ORAL 2 TIMES DAILY
Qty: 180 TABLET | Refills: 1 | Status: SHIPPED | OUTPATIENT
Start: 2025-04-10

## 2025-04-10 RX ORDER — ATORVASTATIN CALCIUM 20 MG/1
20 TABLET, FILM COATED ORAL DAILY
Qty: 90 TABLET | Refills: 1 | Status: SHIPPED | OUTPATIENT
Start: 2025-04-10 | End: 2025-04-10 | Stop reason: SDUPTHER

## 2025-04-10 RX ORDER — ROSUVASTATIN CALCIUM 5 MG/1
5 TABLET, COATED ORAL DAILY
Qty: 90 TABLET | Refills: 1 | Status: SHIPPED | OUTPATIENT
Start: 2025-04-10

## 2025-04-10 NOTE — PROGRESS NOTES
[facility] INTERNAL MEDICINE  OUTPATIENT OFFICE VISIT NOTE    SUBJECTIVE:      HPI: Nikolas Gutierrez is a 74 y.o. male here today for f/u  This is a 72-year-old male, who is known to Dr. Gates, with a history of CAD/CABG and PCI, HTN, HLD, venous insufficiency.     Mr. Connor is doing fair today.  He states that he is also undergoing some stress at home because of that his been noticing some tightening spasms his low back- this has been ongoing but manageable.     He does have an appointment coming up to see his cardiologist next month.    He has been watching his diet very closely-he is cut out all saturated fats, refined sugars from his diet and has lost some weight.  States that overall he feels much better    Current Outpatient Medications   Medication Instructions    clonazePAM (KLONOPIN) 1 mg, Oral, 3 times daily PRN    ezetimibe (ZETIA) 10 mg, Daily    fluorouraciL (EFUDEX) 5 % cream Topical (Top), 2 times daily, Apply topically.    fluticasone propionate (FLONASE) 50 mcg, Each Nostril, Daily    imiquimod (ALDARA) 5 % cream Topical (Top), Three times weekly    lisinopriL (PRINIVIL,ZESTRIL) 5 mg, Oral, Daily    loratadine (CLARITIN) 10 mg, Daily    metoprolol tartrate (LOPRESSOR) 25 mg, Oral, 2 times daily    nitroGLYCERIN (NITROSTAT) 0.4 mg, Sublingual, As needed (PRN)    NON FORMULARY MEDICATION 1,000 mg, 3 times daily PRN    ranolazine (RANEXA) 500 mg, Oral, 2 times daily    rosuvastatin (CRESTOR) 5 mg, Oral, Daily    tadalafiL (CIALIS) 20 mg, Oral, As needed (PRN)    traMADoL (ULTRAM) 50 mg, Oral, Every 12 hours PRN       ROS:  CONSTITUTIONAL: Denies weight loss, fever and chills.  HEENT: Denies changes in vision and hearing.  ?RESPIRATORY: Denies SOB and cough.?  CV: Denies palpitations and CP. ?  GI: Denies abdominal pain, nausea, vomiting and diarrhea.?  : Denies dysuria and urinary frequency.?  MSK: Denies myalgia and joint pain.?  SKIN: Denies rash and pruritus.  ?NEUROLOGICAL: Denies headache and  "syncope.?  PSYCHIATRIC: Denies recent changes in mood. Denies anxiety and depression.     OBJECTIVE:       Visit Vitals  /67 (BP Location: Left arm, Patient Position: Sitting)   Pulse 74   Temp 97.7 °F (36.5 °C) (Oral)   Resp 20   Ht 5' 8" (1.727 m)   Wt 70.3 kg (155 lb)   SpO2 96%   BMI 23.57 kg/m²        General: Well nourished w/o distress  HEENT: NC/AT; PERRLA; nasal and oral mucosa moist and clear; no sinus tenderness; no thyromegaly  Neck: Full ROM; no lymphadenopathy  Pulm: CTA bilaterally, normal work of breathing  CV: S1, S2 w/o murmurs or gallops; no edema noted  MSK: Full ROM of all extremities   Neuro: AAOx4; CN II-XII intact; motor/sensory function intact  Psych: Cooperative; appropriate mood and affect       ASSESSMENT & PLAN:     CAD      July 2023- left heart catheterization with successful laser arthrectomy and balloon angioplasty to the distal RCA and right PDA.         Remains on meds as above- cruzito to see [facility] in a few days                2. Lumbar deg disc ds      Recovering well from spine surgery- fusion      Continuing with PT         4. Allergic Rhinitis      Stable on Claritin, Flonase     5. HTN      BP= 105/67     6. Hyperlipidemia      Crestor    7. Generalized anxiety     On Klonopin     10 Hypogonadism  - Saw Urology   - On Clomid and Cialis     1. Degeneration of intervertebral disc of lumbar region, unspecified whether pain present    2. Generalized anxiety disorder    3. Dyslipidemia  -     Discontinue: atorvastatin (LIPITOR) 20 MG tablet; Take 1 tablet (20 mg total) by mouth once daily.  Dispense: 90 tablet; Refill: 1  -     rosuvastatin (CRESTOR) 5 MG tablet; Take 1 tablet (5 mg total) by mouth once daily.  Dispense: 90 tablet; Refill: 1    4. Hypertension, unspecified type  -     metoprolol tartrate (LOPRESSOR) 25 MG tablet; Take 1 tablet (25 mg total) by mouth 2 (two) times a day.  Dispense: 180 tablet; Refill: 1           Follow up in about 6 months (around 10/10/2025). "     Future Appointments   Date Time Provider Department Center   5/14/2025 10:00 AM Larry Ashton NP [facility] [place]    8/14/2025 10:20 AM [facility], OSWALDO Quiros MD

## 2025-04-21 DIAGNOSIS — E29.1 HYPOGONADISM IN MALE: Primary | ICD-10-CM

## 2025-04-25 DIAGNOSIS — F41.9 ANXIETY: ICD-10-CM

## 2025-05-07 RX ORDER — CLONAZEPAM 1 MG/1
1 TABLET ORAL 3 TIMES DAILY PRN
Qty: 90 TABLET | Refills: 0 | Status: SHIPPED | OUTPATIENT
Start: 2025-05-07 | End: 2025-06-06

## 2025-05-12 DIAGNOSIS — F41.9 ANXIETY: ICD-10-CM

## 2025-05-12 DIAGNOSIS — E78.5 DYSLIPIDEMIA: ICD-10-CM

## 2025-05-12 RX ORDER — RANOLAZINE 500 MG/1
500 TABLET, EXTENDED RELEASE ORAL 2 TIMES DAILY
Qty: 30 TABLET | Refills: 3 | Status: SHIPPED | OUTPATIENT
Start: 2025-05-12

## 2025-05-12 RX ORDER — FLUTICASONE PROPIONATE 50 MCG
1 SPRAY, SUSPENSION (ML) NASAL DAILY
Qty: 11.1 ML | Refills: 6 | Status: SHIPPED | OUTPATIENT
Start: 2025-05-12

## 2025-05-12 RX ORDER — LISINOPRIL 5 MG/1
5 TABLET ORAL DAILY
Qty: 90 TABLET | Refills: 3 | Status: CANCELLED | OUTPATIENT
Start: 2025-05-12 | End: 2026-05-12

## 2025-05-12 RX ORDER — CLONAZEPAM 1 MG/1
1 TABLET ORAL 3 TIMES DAILY PRN
Qty: 90 TABLET | Refills: 0 | OUTPATIENT
Start: 2025-05-12 | End: 2025-06-11

## 2025-05-12 RX ORDER — ROSUVASTATIN CALCIUM 5 MG/1
5 TABLET, COATED ORAL DAILY
Qty: 90 TABLET | Refills: 1 | OUTPATIENT
Start: 2025-05-12

## 2025-05-16 DIAGNOSIS — N52.9 ERECTILE DYSFUNCTION, UNSPECIFIED ERECTILE DYSFUNCTION TYPE: ICD-10-CM

## 2025-05-16 RX ORDER — SILDENAFIL 50 MG/1
50 TABLET, FILM COATED ORAL DAILY PRN
Qty: 10 TABLET | Refills: 11 | Status: SHIPPED | OUTPATIENT
Start: 2025-05-16

## 2025-05-19 ENCOUNTER — LAB VISIT (OUTPATIENT)
Dept: LAB | Facility: HOSPITAL | Age: 75
End: 2025-05-19
Attending: NURSE PRACTITIONER
Payer: MEDICARE

## 2025-05-19 DIAGNOSIS — E29.1 HYPOGONADISM IN MALE: ICD-10-CM

## 2025-05-19 LAB — TESTOST SERPL-MCNC: 103.61 NG/DL (ref 220.91–715.81)

## 2025-05-19 PROCEDURE — 84403 ASSAY OF TOTAL TESTOSTERONE: CPT

## 2025-05-19 PROCEDURE — 36415 COLL VENOUS BLD VENIPUNCTURE: CPT

## 2025-05-30 DIAGNOSIS — M54.9 BACK PAIN, UNSPECIFIED BACK LOCATION, UNSPECIFIED BACK PAIN LATERALITY, UNSPECIFIED CHRONICITY: ICD-10-CM

## 2025-06-02 RX ORDER — TRAMADOL HYDROCHLORIDE 50 MG/1
50 TABLET, FILM COATED ORAL EVERY 12 HOURS PRN
Qty: 14 TABLET | Refills: 1 | Status: SHIPPED | OUTPATIENT
Start: 2025-06-02

## 2025-06-09 ENCOUNTER — OFFICE VISIT (OUTPATIENT)
Dept: UROLOGY | Facility: CLINIC | Age: 75
End: 2025-06-09
Payer: MEDICARE

## 2025-06-09 DIAGNOSIS — N52.9 ERECTILE DYSFUNCTION, UNSPECIFIED ERECTILE DYSFUNCTION TYPE: Primary | ICD-10-CM

## 2025-06-09 RX ORDER — TESTOSTERONE CYPIONATE 200 MG/ML
200 INJECTION, SOLUTION INTRAMUSCULAR
Qty: 10 ML | Refills: 1 | Status: SHIPPED | OUTPATIENT
Start: 2025-06-09 | End: 2025-06-09

## 2025-06-09 RX ORDER — TESTOSTERONE CYPIONATE 200 MG/ML
200 INJECTION, SOLUTION INTRAMUSCULAR
Qty: 10 ML | Refills: 1 | Status: SHIPPED | OUTPATIENT
Start: 2025-06-09 | End: 2025-12-08

## 2025-06-09 NOTE — PROGRESS NOTES
Chief Complaint: No chief complaint on file.      HPI:  Patient is a 74-year-old male here for audio virtual follow-up for renal cyst, hypogonadism, BPH.  Patient seen in the past for renal cysts with follow-up renal ultrasounds which are currently stable per last ultrasound.  Patient treated with Clomid 50 mg p.o.BID.  Patient's current testosterone level 103.61, PSA 0.69.  Patient treated in the past with BPH and erectile dysfunction with Cialis 5 mg p.o. daily, Cialis 20 mg p.o. p.r.n..  Today patient denies any abnormal urologic dysuria, urinary frequency, urgency, hesitancy urinary incontinence, nocturia, gross hematuria.  However patient does complain of fatigue lack of sexual interest and persistent erectile dysfunction.  Instructed patient to discontinue Clomid and start testosterone 200 mg per mL IM subcu every 21 days, repeat testosterone lab in 3 months.       Allergies:  Review of patient's allergies indicates:   Allergen Reactions    Tomato Blisters       Medications:  Current Medications[1]    Review of Systems:  General: No fever, chills, fatigability, or weight loss.  Skin: No rashes, itching, or changes in color or texture of skin.  Chest: Denies BARBOZA, cyanosis, wheezing, cough, and sputum production.  Abdomen: Appetite fine. No weight loss. Denies diarrhea, abdominal pain, hematemesis, or blood in stool.  Musculoskeletal: No joint stiffness or swelling. Denies back pain.  : As above.  All other review of systems negative.    PMH:  Past Medical History:   Diagnosis Date    Actinic keratoses     Acute non-ST elevation myocardial infarction (NSTEMI) 03/2019    Basal cell carcinoma (BCC) of skin of lower extremity including hip 08/14/2015    R mid-distal shin, failed therapy w/ Aldera cream, s/p electrodessication & curettage 12/2015    CAD, multiple vessel     Calculus of kidney 05/23/2022    s/p lithotripsy years ago    Empyema of left pleural space     History of excision of pilonidal cyst      Other seasonal allergic rhinitis     Severe acute respiratory syndrome coronavirus 2 (SARS-CoV-2) vaccination not indicated 05/23/2022    Problem added via discern rule sz_covid_pos_neg    Simple hepatic cyst     R dome    Skin cancer        PSH:  Past Surgical History:   Procedure Laterality Date    angiogram with 12 stents      ANTERIOR LUMBAR INTERBODY FUSION (ALIF) USING COMPUTER-ASSISTED NAVIGATION N/A 11/18/2022    Procedure: FUSION, SPINE, LUMBAR, ALIF, USING COMPUTER-ASSISTED NAVIGATION;  Surgeon: Viral Gonzalez MD;  Location: [facility] OR;  Service: Neurosurgery;  Laterality: N/A;  ANTERIOR LUMBAR INTERBODY FUSION L5/S1  // RIGHT L5/S1 FORAMINOTOMIES // DRILL // SCOPE // SUPINE // SKYTRON //  LUKE TO OPEN // DIRECT SPINE // NDM    ATHERECTOMY  06/30/2023    Procedure: Atherectomy;  Surgeon: Del Gates MD;  Location: [facility] CATH LAB;  Service: Cardiology;;    BACK SURGERY  11/18/2022    cabgx2      cabgx4      CATARACT EXTRACTION W/  INTRAOCULAR LENS IMPLANT Left 1/7/2025    Procedure: EXTRACTION, CATARACT, WITH IOL INSERTION;  Surgeon: Prabhu Mejia MD;  Location: [facility] OR;  Service: Ophthalmology;  Laterality: Left;    LEFT HEART CATHETERIZATION N/A 06/30/2023    Procedure: Left heart cath;  Surgeon: Del Gates MD;  Location: [facility] CATH LAB;  Service: Cardiology;  Laterality: N/A;    LITHOTRIPSY      PERCUTANEOUS TRANSLUMINAL BALLOON ANGIOPLASTY OF CORONARY ARTERY  06/30/2023    Procedure: Angioplasty-coronary;  Surgeon: Del Gates MD;  Location: [facility] CATH LAB;  Service: Cardiology;;    pilonidial cyst excision      TOTAL HIP ARTHROPLASTY Right 05/13/2014    Secondary to avascular necrosis of femoral head       FamHx:  No family history on file.    SocHx:  Social History[2]    Physical Exam:  There were no vitals filed for this visit.  General: A&Ox3, no apparent distress, no deformities      Labs:    Latest Reference Range & Units 06/15/17 08:30 08/12/19 13:45 04/28/20 09:45 07/15/22 10:28  04/28/23 08:30 07/26/24 09:35 02/07/25 12:38   Prostate Specific Antigen <=4.00 ng/mL 1.8 2.3 2.3 1.71 1.39 0.60 0.69         Impression:  Erectile dysfunction  Hypogonadism  BPH    Plan:  Instructed patient to discontinue Clomid and start testosterone injections 200 mg per mL every 3 weeks, repeat testosterone labs 3 months.  Instructed patient on timed voiding every 2-3 hrs, double voiding, avoidance of bladder irritants such as alcohol, citrus foods, chocolate, caffeinated drinks, energy drinks, spicy foods, sugar, caffeine products, sodas. Instructed patient to avoid drinking fluids 1-2 hours prior to bedtime & void immediately before bedtime.   RTC three-month visit to discuss testosterone labs.  Instructed patient if develops any abnormal urologic symptoms notify clinic to be re-evaluate treated or during after hours go to emergency room versus urgent here.                           [facility]         [1]   Current Outpatient Medications   Medication Sig Dispense Refill    clonazePAM (KLONOPIN) 1 MG tablet Take 1 tablet (1 mg total) by mouth 3 (three) times daily as needed for Anxiety. 90 tablet 0    ezetimibe (ZETIA) 10 mg tablet Take 10 mg by mouth once daily.      fluorouraciL (EFUDEX) 5 % cream Apply topically 2 (two) times daily. Apply topically. 40 g 2    fluticasone propionate (FLONASE) 50 mcg/actuation nasal spray 1 spray (50 mcg total) by Each Nostril route once daily. 11.1 mL 6    imiquimod (ALDARA) 5 % cream Apply topically 3 (three) times a week. (Patient not taking: Reported on 6/27/2024) 12 packet 2    lisinopriL (PRINIVIL,ZESTRIL) 5 MG tablet Take 1 tablet (5 mg total) by mouth once daily. 90 tablet 3    loratadine (CLARITIN) 10 mg tablet Take 10 mg by mouth once daily.      metoprolol tartrate (LOPRESSOR) 25 MG tablet Take 1 tablet (25 mg total) by mouth 2 (two) times a day. 180 tablet 1    nitroGLYCERIN (NITROSTAT) 0.4 MG SL tablet Place 1 tablet (0.4 mg total) under the tongue as needed. 30 tablet 3     NON FORMULARY MEDICATION Take 1,000 mg by mouth 3 (three) times daily as needed.      ranolazine (RANEXA) 500 MG Tb12 Take 1 tablet (500 mg total) by mouth 2 (two) times daily. 30 tablet 3    rosuvastatin (CRESTOR) 5 MG tablet Take 1 tablet (5 mg total) by mouth once daily. 90 tablet 1    sildenafiL (VIAGRA) 50 MG tablet TAKE 1 TABLET BY MOUTH EVERY DAY AS NEEDED FOR ERECTILE DYSFUNCTION 10 tablet 11    tadalafiL (CIALIS) 20 MG Tab Take 1 tablet (20 mg total) by mouth as needed. 30 tablet 3    traMADoL (ULTRAM) 50 mg tablet Take 1 tablet (50 mg total) by mouth every 12 (twelve) hours as needed for Pain. 14 tablet 1     Current Facility-Administered Medications   Medication Dose Route Frequency Provider Last Rate Last Admin    triamcinolone acetonide injection 40 mg  40 mg Intra-articular 1 time in Clinic/HOD Kamini Freeman NP         Facility-Administered Medications Ordered in Other Visits   Medication Dose Route Frequency Provider Last Rate Last Admin    HYDROmorphone (PF) injection 0.2 mg  0.2 mg Intravenous Q5 Min PRN Christopher Ojeda MD   0.2 mg at 11/19/22 0245    lactated ringers infusion   Intravenous Continuous Viral Gonzalez MD        LIDOcaine (PF) 10 mg/ml (1%) injection 10 mg  1 mL Intradermal Once Viral Gonzalez MD        ondansetron injection 4 mg  4 mg Intravenous Once PRN Christopher Ojeda MD       [2]   Social History  Socioeconomic History    Marital status:    Tobacco Use    Smoking status: Former     Types: Cigarettes     Passive exposure: Current    Smokeless tobacco: Never   Substance and Sexual Activity    Alcohol use: Not Currently    Drug use: Never    Sexual activity: Yes     Partners: Female     Social Drivers of Health     Transportation Needs: No Transportation Needs (3/25/2024)    PRAPARE - Transportation     Lack of Transportation (Medical): No     Lack of Transportation (Non-Medical): No

## 2025-06-12 DIAGNOSIS — N52.9 ERECTILE DYSFUNCTION, UNSPECIFIED ERECTILE DYSFUNCTION TYPE: ICD-10-CM

## 2025-06-12 DIAGNOSIS — M54.9 BACK PAIN, UNSPECIFIED BACK LOCATION, UNSPECIFIED BACK PAIN LATERALITY, UNSPECIFIED CHRONICITY: ICD-10-CM

## 2025-06-12 RX ORDER — TRAMADOL HYDROCHLORIDE 50 MG/1
50 TABLET, FILM COATED ORAL EVERY 12 HOURS PRN
Qty: 14 TABLET | Refills: 1 | OUTPATIENT
Start: 2025-06-12

## 2025-06-12 RX ORDER — SILDENAFIL 50 MG/1
50 TABLET, FILM COATED ORAL DAILY PRN
Qty: 10 TABLET | Refills: 11 | Status: SHIPPED | OUTPATIENT
Start: 2025-06-12

## 2025-06-17 ENCOUNTER — OFFICE VISIT (OUTPATIENT)
Dept: FAMILY MEDICINE | Facility: CLINIC | Age: 75
End: 2025-06-17
Payer: MEDICARE

## 2025-06-17 VITALS
HEIGHT: 68 IN | RESPIRATION RATE: 20 BRPM | SYSTOLIC BLOOD PRESSURE: 130 MMHG | OXYGEN SATURATION: 99 % | WEIGHT: 156 LBS | DIASTOLIC BLOOD PRESSURE: 72 MMHG | HEART RATE: 68 BPM | BODY MASS INDEX: 23.64 KG/M2 | TEMPERATURE: 98 F

## 2025-06-17 DIAGNOSIS — L57.0 ACTINIC KERATOSIS: Primary | ICD-10-CM

## 2025-06-17 PROCEDURE — 17003 DESTRUCT PREMALG LES 2-14: CPT | Mod: PBBFAC

## 2025-06-17 PROCEDURE — 99215 OFFICE O/P EST HI 40 MIN: CPT | Mod: PBBFAC,25

## 2025-06-17 PROCEDURE — 17000 DESTRUCT PREMALG LESION: CPT | Mod: PBBFAC

## 2025-06-17 RX ORDER — FLUOROURACIL 50 MG/G
CREAM TOPICAL 2 TIMES DAILY
Qty: 40 G | Refills: 2 | Status: SHIPPED | OUTPATIENT
Start: 2025-06-17

## 2025-06-17 NOTE — PROGRESS NOTES
"[facility] Minor Surgery Note    ID:  Nikolas Gutierrez   MRN:  [id number]     6/17/2025    Chief Complaint:  Evaluation of AK  s/p cryotherapy and Efudex     History of Present Illness:  Nikolas Gutierrez is a 74 y.o. male who presents to [facility] clinic for evaluation of AK lesions s/p cryotherapy and Efudex. Patient had diagnosed with AK on scalp, bilateral ears and nose bridge. He had been treated with cryotherapy and Efudex with last visit on 7/29/2024. States recurrent of similar lesions that were treated last year. Prefers to received treatment with cryotherapy and Efudex. Denies pain, itchiness and bleeding.       Review of patient's allergies indicates:   Allergen Reactions    Tomato Blisters       Review of Systems:  As per HPI      Physical Exam:  /72 (BP Location: Right arm, Patient Position: Sitting)   Pulse 68   Temp 98.1 °F (36.7 °C) (Oral)   Resp 20   Ht 5' 8" (1.727 m)   Wt 70.8 kg (156 lb)   SpO2 99%   BMI 23.72 kg/m²     Physical Exam   Gen: Alert, no acute distress   Skin: Rough, scaly lesions on erythematous base consistent with AK on left arm x, left ear x 1, right ear x 1, left chest x 1, scalp x 10  Psych: Cooperative, appropriate mood and affect     Procedure Note:  Procedure: Cryotherapy  Performed by: Gilbert Reyes MD  Supervised by: Jesusita Monreal MD  Consent: signed consent obtained after discussion of risks, benefits, and alternative treatments  Time out completed  Locations: Left arm x 1, left ear x 1, right ear x 1, left chest x 1, scalp x 10  Description:  Liquid nitrogen used for cryotherapy. Tip held 1 cm from lesion and sprayed in freeze-thaw cycle.   The patient tolerated the procedure well with no complications.          Assessment/Plan:    Actinic keratoses  See procedure note above  Post care instructions and return precautions discussed  Prescribed Efudex    Follow-up  Follow up in about 3 months (around 9/17/2025) for re-evaluation of AK s/p " cryotherapy.    Gilbert Reyes MD  [facility], [facility], [facility]  06/17/2025

## 2025-06-20 DIAGNOSIS — M54.9 BACK PAIN, UNSPECIFIED BACK LOCATION, UNSPECIFIED BACK PAIN LATERALITY, UNSPECIFIED CHRONICITY: ICD-10-CM

## 2025-06-20 DIAGNOSIS — F41.9 ANXIETY: ICD-10-CM

## 2025-07-09 RX ORDER — TRAMADOL HYDROCHLORIDE 50 MG/1
50 TABLET, FILM COATED ORAL EVERY 12 HOURS PRN
Qty: 14 TABLET | Refills: 1 | Status: SHIPPED | OUTPATIENT
Start: 2025-07-09

## 2025-07-09 RX ORDER — CLONAZEPAM 1 MG/1
1 TABLET ORAL 3 TIMES DAILY PRN
Qty: 90 TABLET | Refills: 0 | Status: SHIPPED | OUTPATIENT
Start: 2025-07-09 | End: 2025-08-08

## 2025-08-14 ENCOUNTER — OFFICE VISIT (OUTPATIENT)
Dept: OPHTHALMOLOGY | Facility: CLINIC | Age: 75
End: 2025-08-14
Payer: MEDICARE

## 2025-08-14 ENCOUNTER — TELEPHONE (OUTPATIENT)
Dept: OPHTHALMOLOGY | Facility: CLINIC | Age: 75
End: 2025-08-14

## 2025-08-14 VITALS — BODY MASS INDEX: 22.73 KG/M2 | HEIGHT: 68 IN | WEIGHT: 150 LBS

## 2025-08-14 DIAGNOSIS — H25.11 AGE-RELATED NUCLEAR CATARACT OF RIGHT EYE: Primary | ICD-10-CM

## 2025-08-14 DIAGNOSIS — Z96.1 PSEUDOPHAKIA, LEFT EYE: ICD-10-CM

## 2025-08-14 PROCEDURE — 99214 OFFICE O/P EST MOD 30 MIN: CPT | Mod: PBBFAC,PN

## 2025-08-14 PROCEDURE — 92133 CPTRZD OPH DX IMG PST SGM ON: CPT | Mod: PBBFAC,PN

## 2025-08-14 RX ORDER — ATORVASTATIN CALCIUM 20 MG/1
20 TABLET, FILM COATED ORAL NIGHTLY
COMMUNITY
Start: 2025-04-10

## 2025-08-14 RX ORDER — DICLOFENAC SODIUM 75 MG/1
75 TABLET, DELAYED RELEASE ORAL 2 TIMES DAILY
COMMUNITY
Start: 2025-07-10

## 2025-08-14 RX ORDER — CLOMIPHENE CITRATE 50 MG/1
50 TABLET ORAL 2 TIMES DAILY
COMMUNITY
Start: 2025-07-10

## 2025-08-14 RX ORDER — TAMSULOSIN HYDROCHLORIDE 0.4 MG/1
0.4 CAPSULE ORAL DAILY
COMMUNITY
Start: 2025-06-20

## 2025-08-14 RX ORDER — CLOPIDOGREL BISULFATE 75 MG/1
75 TABLET ORAL DAILY
COMMUNITY
Start: 2025-07-10

## 2025-08-25 DIAGNOSIS — F41.9 ANXIETY: ICD-10-CM

## 2025-08-25 RX ORDER — CLONAZEPAM 1 MG/1
1 TABLET ORAL 3 TIMES DAILY PRN
Qty: 90 TABLET | Refills: 0 | Status: SHIPPED | OUTPATIENT
Start: 2025-08-25 | End: 2025-09-24

## (undated) DEVICE — SYS WASTE FLD DISPOSAL 1400ML

## (undated) DEVICE — KIT C.A.T.S. FAST START 4/CASE

## (undated) DEVICE — HEAD PRECISION MATCH LILAC 2.5

## (undated) DEVICE — NDL MAGELLAN SAFETY 18G 1.5IN

## (undated) DEVICE — SHEET XLGE DRAPE

## (undated) DEVICE — SUT SILK 3-0 STRANDS 30IN

## (undated) DEVICE — GLOVE PROTEXIS PI SYN SURG 6.5

## (undated) DEVICE — CANNULA NASAL ADULT

## (undated) DEVICE — DRAPE TOWEL TIBURON 19X30IN

## (undated) DEVICE — KIT SURGICAL TURNOVER

## (undated) DEVICE — DRESSING TRANS 2X2 TEGADERM

## (undated) DEVICE — DRESSING TELFA + BARR 4X6IN

## (undated) DEVICE — CLOSURE SKIN STERI STRIP 1/2X4

## (undated) DEVICE — SOL NACL IRR 3000ML

## (undated) DEVICE — CONTAINER SPECIMEN OR STER 4OZ

## (undated) DEVICE — GUIDE LAUNCHER 6FR 100CM IMA

## (undated) DEVICE — SPNG CHERRY DISECT XRAY DTECT

## (undated) DEVICE — DEVICE INDEFLATOR BASIX

## (undated) DEVICE — SUT VICRYL 3-0 27 SH

## (undated) DEVICE — TRAY CATH FOL SIL URIMTR 16FR

## (undated) DEVICE — TUBING HP AIRLSS ROT ADPT 30IN

## (undated) DEVICE — PILLOW HEAD REST

## (undated) DEVICE — ELECTRODE BLADE E-Z CLEAN 4IN

## (undated) DEVICE — DRESSING SURGICAL 1/2X1/2

## (undated) DEVICE — GLOVE PROTEXIS BLUE LATEX 7

## (undated) DEVICE — CLIP LIGATING MEDIUM

## (undated) DEVICE — WIRE GUIDE INQWIRE STR 180CM

## (undated) DEVICE — DRAPE EQUIP BTTN WALTERLORENZ

## (undated) DEVICE — TIP SUCTION YANKAUER

## (undated) DEVICE — KIT SURGIFLO HEMOSTATIC MATRIX

## (undated) DEVICE — KIT NAVIGATED PEDICLE ACCESS

## (undated) DEVICE — CLIP HORIZON LIG TI MED-LG

## (undated) DEVICE — RING MALUYGEN

## (undated) DEVICE — DRAPE C-ARMOR EQUIPMENT COVER

## (undated) DEVICE — SUT VICRYL PLUS 3-0 X-1 18IN

## (undated) DEVICE — SPHERE NDI PASSIVE

## (undated) DEVICE — SHEATH INTRODUCER 6FR 11CM

## (undated) DEVICE — CATH LASER POINT ELCA 9 X 80

## (undated) DEVICE — SUT MCRYL PLUS 4-0 PS2 27IN

## (undated) DEVICE — NDL FLTR 5MCRN BLNT TIP 18GX1

## (undated) DEVICE — DRAPE FLUID WARMER 44X44IN

## (undated) DEVICE — SEE MEDLINE ITEM 146292

## (undated) DEVICE — Device

## (undated) DEVICE — SUT PROLENE 5-0 36IN C-1

## (undated) DEVICE — PAD DEFIB CADENCE ADULT R2

## (undated) DEVICE — CANNULA OPTH 27G .41X22MM

## (undated) DEVICE — GLOVE SIGNATURE MICRO LTX 7

## (undated) DEVICE — RESERVOIR (FOR C A T S)

## (undated) DEVICE — NDL SPINAL 20GX3.5 HUB

## (undated) DEVICE — DISH PETRI MED 3.5IN

## (undated) DEVICE — SUT VICRYL 2-0 27 CT-1

## (undated) DEVICE — SUT VICRYL PLUS 0 CT-1 18IN

## (undated) DEVICE — SOL NACL IRR 1000ML BTL

## (undated) DEVICE — PACK CATARACT BAUSCH AND LOMB

## (undated) DEVICE — DRESSING TELFA N ADH 3X8IN

## (undated) DEVICE — SOL 1L ACD-A

## (undated) DEVICE — CONTAINER SPECIMEN SCREW 4OZ

## (undated) DEVICE — SYR 3CC LUER LOC

## (undated) DEVICE — SHEET AVIENE MICFIB 1.4X1.4IN

## (undated) DEVICE — GLOVE PROTEXIS BLUE LATEX 8

## (undated) DEVICE — KIT POS JACKSON TABLE NO HDRST

## (undated) DEVICE — SUT BONE WAX 2.5 GRMS 12/BX

## (undated) DEVICE — PROBE PRASS SLIM

## (undated) DEVICE — GLOVE PROTEXIS HYDROGEL SZ7.5

## (undated) DEVICE — APPLICATOR CHLORAPREP ORN 26ML

## (undated) DEVICE — NDL HYPO 22GX1 1/2 SYR 10ML LL

## (undated) DEVICE — PROTECTOR ONE-STEP ARM REG

## (undated) DEVICE — COVER HD BACK TABLE 6FT

## (undated) DEVICE — ELECTRODE PATIENT RETURN DISP

## (undated) DEVICE — DRESSING ABSRBNT ISLAND 3.6X8

## (undated) DEVICE — DRAPE INCISE IOBAN 2 23X23IN

## (undated) DEVICE — COVER C-ARM STRAP BAND 44X80IN

## (undated) DEVICE — BENZOIN TINCTURE 0.66ML

## (undated) DEVICE — CATH IMPULSE MP 5FR 145CM

## (undated) DEVICE — SYS CLSR DERMABOND PRINEO 22CM

## (undated) DEVICE — INSTRUMENT FRAZIER 10FR W/VENT

## (undated) DEVICE — SUT VICRYL+ 27 UR-6 VIOL

## (undated) DEVICE — GLOVE PROTEXIS LTX MICRO  7.5

## (undated) DEVICE — SYR EAR ULCER SGL USE 2OZ

## (undated) DEVICE — SUT VICRYL 4-0 18 P-3

## (undated) DEVICE — TAPE CURAD PAPER ADH 1INX10YD

## (undated) DEVICE — GOWN X-LG STERILE BACK

## (undated) DEVICE — GLOVE SENSICARE NEOPRENE 6.5

## (undated) DEVICE — DRAPE OPMI STERILE

## (undated) DEVICE — TUBING SILICON CLR 3/16IN 10FT

## (undated) DEVICE — GLOVE SIGNATURE MICRO LTX 8

## (undated) DEVICE — CORD BIPOLAR 12 FOOT

## (undated) DEVICE — SUT ETHIBOND EXCEL 1 CTX 18

## (undated) DEVICE — DRESSING SURGICAL 3/4X3/4

## (undated) DEVICE — SPONGE WEC CEL SPEARS

## (undated) DEVICE — SPONGE LAP STRL 18X18IN

## (undated) DEVICE — DRAPE TOP 53X102IN

## (undated) DEVICE — DRAPE O-ARM STERILE

## (undated) DEVICE — ELECTRODE BLD EXT 6.50 ST DISP

## (undated) DEVICE — KIT HAND CONTROL HIGH PRESSUR

## (undated) DEVICE — ELECTRODE BLADE INSULATED 1 IN

## (undated) DEVICE — CATH EMERGE MR 30 X 2.50

## (undated) DEVICE — GUIDEWIRE INQWIRE SE 3MM JTIP

## (undated) DEVICE — GLOVE SENSICARE PI GRN 7.5

## (undated) DEVICE — GUIDEWIRE RUNTHROUGH NS 180CM

## (undated) DEVICE — DRAPE SURG W/TWL 17 5/8X23